# Patient Record
Sex: MALE | Race: WHITE | Employment: UNEMPLOYED | URBAN - METROPOLITAN AREA
[De-identification: names, ages, dates, MRNs, and addresses within clinical notes are randomized per-mention and may not be internally consistent; named-entity substitution may affect disease eponyms.]

---

## 2018-04-10 ENCOUNTER — OFFICE VISIT (OUTPATIENT)
Dept: FAMILY MEDICINE | Facility: CLINIC | Age: 53
End: 2018-04-10
Payer: COMMERCIAL

## 2018-04-10 VITALS
SYSTOLIC BLOOD PRESSURE: 103 MMHG | OXYGEN SATURATION: 95 % | TEMPERATURE: 97 F | RESPIRATION RATE: 16 BRPM | DIASTOLIC BLOOD PRESSURE: 73 MMHG | BODY MASS INDEX: 20.46 KG/M2 | HEART RATE: 102 BPM | WEIGHT: 168 LBS | HEIGHT: 76 IN

## 2018-04-10 DIAGNOSIS — G25.0 ESSENTIAL TREMOR: ICD-10-CM

## 2018-04-10 DIAGNOSIS — S33.5XXA SPRAIN OF LOW BACK, INITIAL ENCOUNTER: Primary | ICD-10-CM

## 2018-04-10 DIAGNOSIS — R56.9 SEIZURE (H): ICD-10-CM

## 2018-04-10 PROCEDURE — 99214 OFFICE O/P EST MOD 30 MIN: CPT | Performed by: FAMILY MEDICINE

## 2018-04-10 RX ORDER — MINOCYCLINE HYDROCHLORIDE 100 MG/1
CAPSULE ORAL
COMMUNITY
Start: 2018-03-20 | End: 2018-08-05

## 2018-04-10 RX ORDER — SULINDAC 200 MG/1
200 TABLET ORAL 2 TIMES DAILY PRN
Qty: 15 TABLET | Refills: 1 | Status: SHIPPED | OUTPATIENT
Start: 2018-04-10 | End: 2018-08-05

## 2018-04-10 RX ORDER — TRIAMCINOLONE ACETONIDE 1 MG/G
OINTMENT TOPICAL
COMMUNITY
Start: 2018-03-20 | End: 2018-08-05

## 2018-04-10 RX ORDER — CYCLOBENZAPRINE HCL 5 MG
5 TABLET ORAL 2 TIMES DAILY PRN
Qty: 30 TABLET | Refills: 1 | Status: SHIPPED | OUTPATIENT
Start: 2018-04-10 | End: 2018-08-05

## 2018-04-10 NOTE — MR AVS SNAPSHOT
"              After Visit Summary   4/10/2018    Jorge L Townsend    MRN: [id number]           Patient Information     Date Of Birth          1965        Visit Information        Provider Department      4/10/2018 12:40 PM Oskar Arthur MD [facility]        Today's Diagnoses     Sprain of low back, initial encounter    -  1    Essential tremor        Seizure (H)           Follow-ups after your visit        Additional Services     PHYSICAL THERAPY REFERRAL       *This therapy referral will be filtered to a centralized scheduling office at [facility] and the patient will receive a call to schedule an appointment at a [facility] location most convenient for them. *     [facility] provides Physical Therapy evaluation and treatment and many specialty services across the [facility] system.  If requesting a specialty program, please choose from the list below.    If you have not heard from the scheduling office within 2 business days, please call [phone] for all locations, with the exception of [place], please call [phone] and [facility], please call [phone]  Treatment: Evaluation & Treatment  Special Instructions/Modalities: none  Special Programs: None    Please be aware that coverage of these services is subject to the terms and limitations of your health insurance plan.  Call member services at your health plan with any benefit or coverage questions.      **Note to Provider:  If you are referring outside of [facility] for the therapy appointment, please list the name of the location in the \"special instructions\" above, print the referral and give to the patient to schedule the appointment.                  Follow-up notes from your care team     Return in about 1 year (around 4/10/2019) for Physical Exam.      Who to contact     If you have questions or need follow up information about today's clinic visit or your schedule please contact [facility] directly at [phone].  Normal or non-critical lab and imaging results will be communicated to you by MyChart, letter or phone within 4 business days after the clinic has received the results. If you do not hear from us within 7 days, please contact the clinic through MyChart or phone. If you have a critical or abnormal lab result, we will notify you by phone as soon as possible.  Submit refill requests through [employer] or call your pharmacy and they will forward the refill request to us. Please allow 3 business days for your refill to be completed.          Additional Information About Your Visit        [employer] Information     [employer] lets you send messages to your doctor, view your test results, renew your prescriptions, schedule appointments and more. To sign up, go to [email or web address] . Click on \"Log in\" on the left side of the screen, which will take you to the Welcome page. Then click on \"Sign up Now\" on the right side of the page.     You will be asked to enter the access code listed below, as well as some personal information. Please follow the directions to create your username and password.     Your access code is: [email or web address]  Expires: 2018  1:03 PM     Your access code will  in 90 days. If you need help or a new code, please call your [facility] clinic or [phone].        Care EveryWhere ID     This is your Care EveryWhere ID. This could be used by other organizations to access your [facility] medical records  [id number]        Your Vitals Were     Pulse Temperature Respirations Height Pulse Oximetry BMI (Body Mass Index)    102 97  F (36.1  C) (Tympanic) 16 6' 4.25\" (1.937 m) 95% 20.32 kg/m2       Blood Pressure from Last 3 Encounters:   04/10/18 103/73   16 125/79   16 102/68    Weight from Last 3 Encounters:   04/10/18 168 lb (76.2 kg)   16 185 lb (83.9 kg)   16 171 lb (77.6 kg)              We Performed the Following     PHYSICAL " THERAPY REFERRAL          Today's Medication Changes          These changes are accurate as of 4/10/18  1:03 PM.  If you have any questions, ask your nurse or doctor.               Start taking these medicines.        Dose/Directions    cyclobenzaprine 5 MG tablet   Commonly known as:  FLEXERIL   Used for:  Sprain of low back, initial encounter   Started by:  Oskar Arthur MD        Dose:  5 mg   Take 1 tablet (5 mg) by mouth 2 times daily as needed for muscle spasms   Quantity:  30 tablet   Refills:  1       sulindac 200 MG tablet   Commonly known as:  CLINORIL   Used for:  Sprain of low back, initial encounter   Started by:  Oskar Arthur MD        Dose:  200 mg   Take 1 tablet (200 mg) by mouth 2 times daily as needed   Quantity:  15 tablet   Refills:  1            Where to get your medicines      These medications were sent to [facility] [place] [address] [address]     Phone:  [phone]     cyclobenzaprine 5 MG tablet    sulindac 200 MG tablet                Primary Care Provider Office Phone # Fax #    Oskar Arthur [phone]       [address] [place] [address]        Equal Access to Services     DEBORA HUYNH AH: Hadii aad ku hadasho Soomaali, waaxda luqadaha, qaybta kaalmada adeegyada, waxay idiin hayaan adeeg kharakaren laeryn corona. So [facility] [phone].    ATENCIÓN: Si habla español, tiene a pleitez disposición servicios gratuitos de asistencia lingüística. Llame al [phone].    We comply with applicable federal civil rights laws and [place] laws. We do not discriminate on the basis of race, color, national origin, age, disability, sex, sexual orientation, or gender identity.            Thank you!     Thank you for choosing [facility]  for your care. Our goal is always to provide you with excellent care. Hearing back from our patients is one way we can continue to improve our  services. Please take a few minutes to complete the written survey that you may receive in the mail after your visit with us. Thank you!             Your Updated Medication List - Protect others around you: Learn how to safely use, store and throw away your medicines at www.disposemymeds.org.          This list is accurate as of 4/10/18  1:03 PM.  Always use your most recent med list.                   Brand Name Dispense Instructions for use Diagnosis    cyclobenzaprine 5 MG tablet    FLEXERIL    30 tablet    Take 1 tablet (5 mg) by mouth 2 times daily as needed for muscle spasms    Sprain of low back, initial encounter       levETIRAcetam 1000 MG Tabs      TAKE 1 TABLET BY MOUTH EVERY 12 HOURS        minocycline 100 MG capsule    MINOCIN/DYNACIN          sulindac 200 MG tablet    CLINORIL    15 tablet    Take 1 tablet (200 mg) by mouth 2 times daily as needed    Sprain of low back, initial encounter       triamcinolone 0.1 % ointment    KENALOG

## 2018-04-10 NOTE — NURSING NOTE
"Chief Complaint   Patient presents with     Back Pain       Initial /73 (Cuff Size: Adult Regular)  Pulse 102  Temp 97  F (36.1  C) (Tympanic)  Resp 16  Ht 6' 4.25\" (1.937 m)  Wt 168 lb (76.2 kg)  SpO2 95%  BMI 20.32 kg/m2 Estimated body mass index is 20.32 kg/(m^2) as calculated from the following:    Height as of this encounter: 6' 4.25\" (1.937 m).    Weight as of this encounter: 168 lb (76.2 kg).  Medication Reconciliation: complete   Melva Resendez, CMA    "

## 2018-04-10 NOTE — PROGRESS NOTES
"  SUBJECTIVE:   Jorge L Townsend is a 52 year old male who presents to clinic today for the following health issues    Musculoskeletal problem/pain      Duration: 3 weeks     Description  Location: low back pain    Intensity:  moderate    Accompanying signs and symptoms: none    History  Previous similar problem: YES- history of back problems   Previous evaluation:  none    Precipitating or alleviating factors:  Trauma or overuse: no   Aggravating factors include: none    Therapies tried and outcome: nothing    Problem list and histories reviewed & adjusted, as indicated.  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     Other chronic nonalcoholic liver disease     Alcohol dependence (H)     Alcohol withdrawal seizure     Seizure (H)     Past Surgical History:   Procedure Laterality Date     THORACIC SURGERY         Social History   Substance Use Topics     Smoking status: Former Smoker     Smokeless tobacco: Never Used     Alcohol use Yes      Comment: \"2-3 straight vodkas nightly \"      Family History   Problem Relation Age of Onset     CANCER Father      CEREBROVASCULAR DISEASE Sister      DIABETES No family hx of      Hypertension No family hx of      Thyroid Disease No family hx of      Glaucoma No family hx of      Macular Degeneration No family hx of          Current Outpatient Prescriptions   Medication Sig Dispense Refill     minocycline (MINOCIN/DYNACIN) 100 MG capsule        sulindac (CLINORIL) 200 MG tablet Take 1 tablet (200 mg) by mouth 2 times daily as needed 15 tablet 1     cyclobenzaprine (FLEXERIL) 5 MG tablet Take 1 tablet (5 mg) by mouth 2 times daily as needed for muscle spasms 30 tablet 1     levETIRAcetam 1000 MG TABS TAKE 1 TABLET BY MOUTH EVERY 12 HOURS  4     triamcinolone (KENALOG) 0.1 % ointment        No Known " "Allergies  Recent Labs   Lab Test  01/20/16   1340  01/11/16   0840  01/09/16   1801  02/12/15   1550   01/22/15   1026   LDL   --    --    --    --    --   61   HDL   --    --    --    --    --   112   TRIG   --    --    --    --    --   55   ALT  94*  67  90*   --    < >  196*   CR   --   0.70  0.82   --    < >  0.66   GFRESTIMATED   --   >90  Non  GFR Calc    >90  Non  GFR Calc     --    < >  >90  Non  GFR Calc     GFRESTBLACK   --   >90   GFR Calc    >90   GFR Calc     --    < >  >90   GFR Calc     POTASSIUM   --   3.8  4.0   --    < >  4.1   TSH   --    --    --   2.72   --    --     < > = values in this interval not displayed.      BP Readings from Last 3 Encounters:   04/10/18 103/73   06/09/16 125/79   01/20/16 102/68    Wt Readings from Last 3 Encounters:   04/10/18 168 lb (76.2 kg)   06/08/16 185 lb (83.9 kg)   01/20/16 171 lb (77.6 kg)                    Reviewed and updated as needed this visit by clinical staff       Reviewed and updated as needed this visit by Provider         ROS:  Constitutional, HEENT, cardiovascular, pulmonary, gi and gu systems are negative, except as otherwise noted.    OBJECTIVE:     /73 (Cuff Size: Adult Regular)  Pulse 102  Temp 97  F (36.1  C) (Tympanic)  Resp 16  Ht 6' 4.25\" (1.937 m)  Wt 168 lb (76.2 kg)  SpO2 95%  BMI 20.32 kg/m2  Body mass index is 20.32 kg/(m^2).  GENERAL: healthy, alert and no distress  NECK: no adenopathy, no asymmetry, masses, or scars and thyroid normal to palpation  RESP: lungs clear to auscultation - no rales, rhonchi or wheezes  CV: regular rate and rhythm, normal S1 S2, no S3 or S4, no murmur, click or rub, no peripheral edema and peripheral pulses strong  ABDOMEN: soft, nontender, no hepatosplenomegaly, no masses and bowel sounds normal  MS: no gross musculoskeletal defects noted, no edema        ASSESSMENT/PLAN:   ASSESSMENT / " PLAN:  (S33.9XXA) Sprain of low back, initial encounter  (primary encounter diagnosis)  Comment: recurred about 2 weeks ago, has no radiculopathy  Will have him to start PT and NSAIDs with muscle relaxant  Plan: PHYSICAL THERAPY REFERRAL, sulindac (CLINORIL)         200 MG tablet, cyclobenzaprine (FLEXERIL) 5 MG         tablet            (G25.0) Essential tremor  Comment: has been stable with current regimen, not been drinking excessively    Plan: encouraged him to try hard to quit drinking     (R56.9) Seizure (H)  Comment: stable, currently on keppra   Plan: will keep watching sx         FUTURE APPOINTMENTS:       - Follow-up visit in 1 year for CPE    Oskar Arthur MD  [facility]

## 2018-05-02 ENCOUNTER — MYC MEDICAL ADVICE (OUTPATIENT)
Dept: FAMILY MEDICINE | Facility: CLINIC | Age: 53
End: 2018-05-02

## 2018-05-02 DIAGNOSIS — G40.909 NONINTRACTABLE EPILEPSY WITHOUT STATUS EPILEPTICUS, UNSPECIFIED EPILEPSY TYPE (H): Primary | ICD-10-CM

## 2018-05-08 ENCOUNTER — TRANSFERRED RECORDS (OUTPATIENT)
Dept: HEALTH INFORMATION MANAGEMENT | Facility: CLINIC | Age: 53
End: 2018-05-08

## 2018-05-30 RX ORDER — LEVETIRACETAM 1000 MG/1
TABLET ORAL
Qty: 180 TABLET | Refills: 0 | Status: SHIPPED | OUTPATIENT
Start: 2018-05-30 | End: 2018-08-05

## 2018-05-30 NOTE — TELEPHONE ENCOUNTER
I will have him to see me every 6 months for f/u, will need lab test at the visit     Pt also need to see [facility] if he should stay in [facility] network, referral order placed  Tapering or weaning off med should be discussed with neurologist as was referred

## 2018-08-05 ENCOUNTER — TRANSFERRED RECORDS (OUTPATIENT)
Dept: HEALTH INFORMATION MANAGEMENT | Facility: CLINIC | Age: 53
End: 2018-08-05

## 2018-08-05 ENCOUNTER — APPOINTMENT (OUTPATIENT)
Dept: CT IMAGING | Facility: CLINIC | Age: 53
End: 2018-08-05
Attending: EMERGENCY MEDICINE
Payer: COMMERCIAL

## 2018-08-05 ENCOUNTER — APPOINTMENT (OUTPATIENT)
Dept: GENERAL RADIOLOGY | Facility: CLINIC | Age: 53
End: 2018-08-05
Attending: RADIOLOGY
Payer: COMMERCIAL

## 2018-08-05 ENCOUNTER — HOSPITAL ENCOUNTER (INPATIENT)
Facility: CLINIC | Age: 53
LOS: 2 days | Discharge: HOME | End: 2018-08-08
Attending: EMERGENCY MEDICINE | Admitting: INTERNAL MEDICINE
Payer: COMMERCIAL

## 2018-08-05 DIAGNOSIS — J93.9 PNEUMOTHORAX ON RIGHT: ICD-10-CM

## 2018-08-05 DIAGNOSIS — R94.2 ABNORMAL LUNG SCAN: Primary | ICD-10-CM

## 2018-08-05 LAB
ANION GAP SERPL CALCULATED.3IONS-SCNC: 7 MMOL/L (ref 3–14)
BASOPHILS # BLD AUTO: 0.1 10E9/L (ref 0–0.2)
BASOPHILS NFR BLD AUTO: 0.8 %
BUN SERPL-MCNC: 15 MG/DL (ref 7–30)
CALCIUM SERPL-MCNC: 9.1 MG/DL (ref 8.5–10.1)
CHLORIDE SERPL-SCNC: 105 MMOL/L (ref 94–109)
CO2 SERPL-SCNC: 27 MMOL/L (ref 20–32)
CREAT SERPL-MCNC: 0.76 MG/DL (ref 0.66–1.25)
DIFFERENTIAL METHOD BLD: ABNORMAL
EOSINOPHIL # BLD AUTO: 0.4 10E9/L (ref 0–0.7)
EOSINOPHIL NFR BLD AUTO: 6.1 %
ERYTHROCYTE [DISTWIDTH] IN BLOOD BY AUTOMATED COUNT: 13.5 % (ref 10–15)
GFR SERPL CREATININE-BSD FRML MDRD: >90 ML/MIN/1.7M2
GLUCOSE SERPL-MCNC: 94 MG/DL (ref 70–99)
HCT VFR BLD AUTO: 39.9 % (ref 40–53)
HGB BLD-MCNC: 13.3 G/DL (ref 13.3–17.7)
IMM GRANULOCYTES # BLD: 0 10E9/L (ref 0–0.4)
IMM GRANULOCYTES NFR BLD: 0.2 %
INR PPP: 0.95 (ref 0.86–1.14)
LYMPHOCYTES # BLD AUTO: 1.8 10E9/L (ref 0.8–5.3)
LYMPHOCYTES NFR BLD AUTO: 30.5 %
MCH RBC QN AUTO: 33 PG (ref 26.5–33)
MCHC RBC AUTO-ENTMCNC: 33.3 G/DL (ref 31.5–36.5)
MCV RBC AUTO: 99 FL (ref 78–100)
MONOCYTES # BLD AUTO: 1.1 10E9/L (ref 0–1.3)
MONOCYTES NFR BLD AUTO: 17.4 %
NEUTROPHILS # BLD AUTO: 2.7 10E9/L (ref 1.6–8.3)
NEUTROPHILS NFR BLD AUTO: 45 %
NRBC # BLD AUTO: 0 10*3/UL
NRBC BLD AUTO-RTO: 0 /100
PLATELET # BLD AUTO: 134 10E9/L (ref 150–450)
POTASSIUM SERPL-SCNC: 4.6 MMOL/L (ref 3.4–5.3)
RADIOLOGIST FLAGS: ABNORMAL
RBC # BLD AUTO: 4.03 10E12/L (ref 4.4–5.9)
SODIUM SERPL-SCNC: 139 MMOL/L (ref 133–144)
WBC # BLD AUTO: 6 10E9/L (ref 4–11)

## 2018-08-05 PROCEDURE — 25000128 H RX IP 250 OP 636: Performed by: EMERGENCY MEDICINE

## 2018-08-05 PROCEDURE — G0378 HOSPITAL OBSERVATION PER HR: HCPCS

## 2018-08-05 PROCEDURE — 0W9930Z DRAINAGE OF RIGHT PLEURAL CAVITY WITH DRAINAGE DEVICE, PERCUTANEOUS APPROACH: ICD-10-PCS | Performed by: RADIOLOGY

## 2018-08-05 PROCEDURE — 27210194 CT CHEST TUBE WITH CATH PLACEMENT

## 2018-08-05 PROCEDURE — 25000125 ZZHC RX 250: Performed by: RADIOLOGY

## 2018-08-05 PROCEDURE — 80048 BASIC METABOLIC PNL TOTAL CA: CPT | Performed by: EMERGENCY MEDICINE

## 2018-08-05 PROCEDURE — 99285 EMERGENCY DEPT VISIT HI MDM: CPT | Mod: 25

## 2018-08-05 PROCEDURE — 71046 X-RAY EXAM CHEST 2 VIEWS: CPT

## 2018-08-05 PROCEDURE — 96374 THER/PROPH/DIAG INJ IV PUSH: CPT

## 2018-08-05 PROCEDURE — 96361 HYDRATE IV INFUSION ADD-ON: CPT

## 2018-08-05 PROCEDURE — 85025 COMPLETE CBC W/AUTO DIFF WBC: CPT | Performed by: EMERGENCY MEDICINE

## 2018-08-05 PROCEDURE — 99220 ZZC INITIAL OBSERVATION CARE,LEVL III: CPT | Performed by: INTERNAL MEDICINE

## 2018-08-05 PROCEDURE — 71250 CT THORAX DX C-: CPT

## 2018-08-05 PROCEDURE — 96376 TX/PRO/DX INJ SAME DRUG ADON: CPT

## 2018-08-05 PROCEDURE — 25000128 H RX IP 250 OP 636: Performed by: RADIOLOGY

## 2018-08-05 PROCEDURE — 85610 PROTHROMBIN TIME: CPT | Performed by: EMERGENCY MEDICINE

## 2018-08-05 RX ORDER — MORPHINE SULFATE 2 MG/ML
1 INJECTION, SOLUTION INTRAMUSCULAR; INTRAVENOUS
Status: DISCONTINUED | OUTPATIENT
Start: 2018-08-05 | End: 2018-08-08 | Stop reason: HOSPADM

## 2018-08-05 RX ORDER — FENTANYL CITRATE 50 UG/ML
25-50 INJECTION, SOLUTION INTRAMUSCULAR; INTRAVENOUS EVERY 5 MIN PRN
Status: DISCONTINUED | OUTPATIENT
Start: 2018-08-05 | End: 2018-08-06

## 2018-08-05 RX ORDER — LEVETIRACETAM 500 MG/1
1000 TABLET ORAL DAILY
Status: DISCONTINUED | OUTPATIENT
Start: 2018-08-06 | End: 2018-08-08 | Stop reason: HOSPADM

## 2018-08-05 RX ORDER — LORAZEPAM 2 MG/ML
1 INJECTION INTRAMUSCULAR ONCE
Status: COMPLETED | OUTPATIENT
Start: 2018-08-05 | End: 2018-08-05

## 2018-08-05 RX ORDER — METRONIDAZOLE 7.5 MG/G
GEL TOPICAL 2 TIMES DAILY
Status: DISCONTINUED | OUTPATIENT
Start: 2018-08-05 | End: 2018-08-08 | Stop reason: HOSPADM

## 2018-08-05 RX ORDER — AMOXICILLIN 250 MG
1 CAPSULE ORAL 2 TIMES DAILY PRN
Status: DISCONTINUED | OUTPATIENT
Start: 2018-08-05 | End: 2018-08-08 | Stop reason: HOSPADM

## 2018-08-05 RX ORDER — LEVETIRACETAM 1000 MG/1
1000 TABLET ORAL EVERY MORNING
COMMUNITY
End: 2018-11-05

## 2018-08-05 RX ORDER — AMOXICILLIN 250 MG
2 CAPSULE ORAL 2 TIMES DAILY PRN
Status: DISCONTINUED | OUTPATIENT
Start: 2018-08-05 | End: 2018-08-08 | Stop reason: HOSPADM

## 2018-08-05 RX ORDER — METRONIDAZOLE 7.5 MG/G
GEL TOPICAL EVERY MORNING
COMMUNITY
End: 2020-01-09

## 2018-08-05 RX ORDER — ACETAMINOPHEN 650 MG/1
650 SUPPOSITORY RECTAL EVERY 4 HOURS PRN
Status: DISCONTINUED | OUTPATIENT
Start: 2018-08-05 | End: 2018-08-08 | Stop reason: HOSPADM

## 2018-08-05 RX ORDER — FLUMAZENIL 0.1 MG/ML
0.2 INJECTION, SOLUTION INTRAVENOUS
Status: DISCONTINUED | OUTPATIENT
Start: 2018-08-05 | End: 2018-08-08 | Stop reason: HOSPADM

## 2018-08-05 RX ORDER — ONDANSETRON 4 MG/1
4 TABLET, ORALLY DISINTEGRATING ORAL EVERY 6 HOURS PRN
Status: DISCONTINUED | OUTPATIENT
Start: 2018-08-05 | End: 2018-08-08 | Stop reason: HOSPADM

## 2018-08-05 RX ORDER — ONDANSETRON 2 MG/ML
4 INJECTION INTRAMUSCULAR; INTRAVENOUS ONCE
Status: DISCONTINUED | OUTPATIENT
Start: 2018-08-05 | End: 2018-08-08 | Stop reason: HOSPADM

## 2018-08-05 RX ORDER — LORAZEPAM 0.5 MG/1
.5-1 TABLET ORAL EVERY 4 HOURS PRN
Status: DISCONTINUED | OUTPATIENT
Start: 2018-08-05 | End: 2018-08-08 | Stop reason: HOSPADM

## 2018-08-05 RX ORDER — ACETAMINOPHEN 325 MG/1
650 TABLET ORAL EVERY 4 HOURS PRN
Status: DISCONTINUED | OUTPATIENT
Start: 2018-08-05 | End: 2018-08-08 | Stop reason: HOSPADM

## 2018-08-05 RX ORDER — NALOXONE HYDROCHLORIDE 0.4 MG/ML
.1-.4 INJECTION, SOLUTION INTRAMUSCULAR; INTRAVENOUS; SUBCUTANEOUS
Status: DISCONTINUED | OUTPATIENT
Start: 2018-08-05 | End: 2018-08-08 | Stop reason: HOSPADM

## 2018-08-05 RX ORDER — POLYETHYLENE GLYCOL 3350 17 G/17G
17 POWDER, FOR SOLUTION ORAL DAILY PRN
Status: DISCONTINUED | OUTPATIENT
Start: 2018-08-05 | End: 2018-08-08 | Stop reason: HOSPADM

## 2018-08-05 RX ORDER — NALOXONE HYDROCHLORIDE 0.4 MG/ML
.1-.4 INJECTION, SOLUTION INTRAMUSCULAR; INTRAVENOUS; SUBCUTANEOUS
Status: DISCONTINUED | OUTPATIENT
Start: 2018-08-05 | End: 2018-08-08

## 2018-08-05 RX ORDER — ONDANSETRON 2 MG/ML
4 INJECTION INTRAMUSCULAR; INTRAVENOUS EVERY 6 HOURS PRN
Status: DISCONTINUED | OUTPATIENT
Start: 2018-08-05 | End: 2018-08-08 | Stop reason: HOSPADM

## 2018-08-05 RX ORDER — LORAZEPAM 2 MG/ML
1 INJECTION INTRAMUSCULAR ONCE
Status: DISCONTINUED | OUTPATIENT
Start: 2018-08-05 | End: 2018-08-05

## 2018-08-05 RX ORDER — LIDOCAINE HYDROCHLORIDE 10 MG/ML
1-30 INJECTION, SOLUTION EPIDURAL; INFILTRATION; INTRACAUDAL; PERINEURAL
Status: COMPLETED | OUTPATIENT
Start: 2018-08-05 | End: 2018-08-05

## 2018-08-05 RX ORDER — MAGNESIUM CARB/ALUMINUM HYDROX 105-160MG
148 TABLET,CHEWABLE ORAL
Status: DISCONTINUED | OUTPATIENT
Start: 2018-08-05 | End: 2018-08-08 | Stop reason: HOSPADM

## 2018-08-05 RX ADMIN — FENTANYL CITRATE 50 MCG: 50 INJECTION, SOLUTION INTRAMUSCULAR; INTRAVENOUS at 16:54

## 2018-08-05 RX ADMIN — MIDAZOLAM HYDROCHLORIDE 1 MG: 1 INJECTION, SOLUTION INTRAMUSCULAR; INTRAVENOUS at 16:54

## 2018-08-05 RX ADMIN — SODIUM CHLORIDE 1000 ML: 9 INJECTION, SOLUTION INTRAVENOUS at 14:38

## 2018-08-05 RX ADMIN — LORAZEPAM 1 MG: 2 INJECTION INTRAMUSCULAR; INTRAVENOUS at 14:42

## 2018-08-05 RX ADMIN — LORAZEPAM 1 MG: 2 INJECTION INTRAMUSCULAR; INTRAVENOUS at 16:04

## 2018-08-05 RX ADMIN — LIDOCAINE HYDROCHLORIDE 25 ML: 10 INJECTION, SOLUTION EPIDURAL; INFILTRATION; INTRACAUDAL; PERINEURAL at 17:38

## 2018-08-05 ASSESSMENT — ENCOUNTER SYMPTOMS
NERVOUS/ANXIOUS: 1
COUGH: 1
SHORTNESS OF BREATH: 1

## 2018-08-05 NOTE — ED PROVIDER NOTES
"  History     Chief Complaint:  Shortness of breath    HPI   Jorge L Townsend is a 52 year old male with a remote history of bilateral pneumothoraces requiring thoracic surgical pleuredesis who presents with shortness of breath. He has not had any issues for many years. However, the patient states that he developed shortness of breath a few weeks ago along with a productive cough. He was seen at [facility] urgent care and had an x-ray completed demonstrating a loculated lower right pneumothorax with air fluid level noted. The patient presented to the ED for further management.     Allergies:  No known drug allergies.     Medications:    Levetiracetam  Minocycline  Sulindac  Kenalog     Past Medical History:    Alcohol Dependence  Alcohol withdrawal seizure  Essential tremor  Chronic nonalcoholic liver disease  Pneumothorax    Past Surgical History:    Thoracic Surgery    Family History:    Cancer-Father  Cerebrovascular Disease-Sister    Social History:  Marital Status: Single  Presents to the ED with a friend  Tobacco Use: Former Smoker  Alcohol Use: Yes  PCP: Oskar Arthur      Review of Systems   Respiratory: Positive for cough and shortness of breath.    Psychiatric/Behavioral: The patient is nervous/anxious.    All other systems reviewed and are negative.      Physical Exam   First Vitals:  BP: (!) 147/103  Heart Rate: 87  Temp: 99  F (37.2  C)  Resp: 20  Height: 195.6 cm (6' 5\")  Weight: 79.4 kg (175 lb)  SpO2: 92 %      Physical Exam  General: Resting comfortably on the gurney, quite anxious and shaking.   Head:  The scalp, face, and head appear normal  Eyes:  The pupils are equal, round, and reactive to light    There is no nystagmus    Extraocular muscles are intact    Conjunctivae and sclerae are normal  ENT:    The nose is normal    Pinnae are normal    The oropharynx is normal    Uvula is in the midline  Neck:  Normal range of motion    There is no rigidity noted    There is no midline cervical spine " pain/tenderness    Trachea is in the midline    No mass is detected  CV:  Regular rate and underlying rhythm     Normal S1/S2, no S3/S4    No pathological murmur detected  Resp:  There are bilateral healed surgical scares from thoracic surgery.     Lungs sounds are heard bilaterally    There is no tachypnea    Non-labored    No rales    No wheezing   GI:  Abdomen is soft, there is no rigidity    No distension    No tympani    No rebound tenderness     Non-surgical without peritoneal features  MS:  Normal muscular tone    Symmetric motor strength    No major joint effusions    No asymmetric leg swelling, no calf tenderness  Skin:  No rash or acute skin lesions noted  Neuro: Speech is normal and fluent  Psych:  Awake. Alert.      Normal affect.  Appropriate interactions.  Lymph: No anterior cervical lymphadenopathy noted    Emergency Department Course   Imaging:  Chest CT without contrast:   1. Right pneumothorax predominantly at the lower right hemithorax and  posterior medial right chest with mass effect consistent with tension  pneumothorax.  2. Two areas of focal parenchymal density in the right upper lobe may  be focal infection or inflammation but could also represent neoplasm  particularly the more superior spiculated density.  3. Emphysematous changes.  Report per radiology.   Radiographic findings were communicated with the patient who voiced understanding of the findings.    A CT scan with chest tube placement was performed by interventional radiology and then the patient was returned to the emergency department.  The patient's symptoms have improved.  There were no complications to the procedure.  The catheter was placed in the right low to mid anterior chest.    Laboratory:  CBC:  WBC 6.0, HGB 13.3,  (L)   BMP:  WNL (Creatinine 0.76)   INR: 0.95    Interventions:  (1438) Normal Saline, 1 liter, IV bolus   (1442) Ativan, 1 mg, IV injection   (1604) Ativan, 1 mg, IV injection       Emergency  Department Course:  Nursing notes and vitals reviewed.  (1402) I performed an exam of the patient as documented above.    A peripheral IV was established. Blood was drawn from the patient. This was sent for laboratory testing, findings above.    The patient was sent for a Chest CT while in the emergency department, findings above.    (1518) I consulted with Dr. Osorio of Thoracic surgery regarding the patient.   Findings and plan explained to the patient who consents to observation.   (1532) I discussed the patient with Dr. Moncada of the hospitalist service, who will place the patient in observation in the observation  area with Dr. Osorio consulting.    (1549) I consulted with Dr. Isaacs of interventional radiology regarding the patient.   (1553) I consulted with Dr. Osorio of Thoracic Surgery regarding the patient.   (1559) I consulted with Dr. Isaacs of interventional radiology regarding the patient.   (1627) I updated Dr. Moncada of the hospitalist service on the plan.   (1639) I consulted with Dr. Osorio of thoracic surgery regarding the patient.     Impression & Plan      Medical Decision Making:  This patient presents with an increase in dyspnea as noted above.  He has a history of bilateral pneumothoraces and pleurodesis in the past.  He has developed new pneumothoraces on the right.  There is tethered/scarred lung in areas and a few pockets of air.  The interventional radiologist  placed a percutaneous CT-guided chest tube into the anterior pneumothorax without complication, and then the patient will be placed in observation overnight with thoracic surgery consultation with Dr. Osorio.  Dr. Moncada will admit from the hospitalist service to observation.  If the patient has persistent areas of pneumothorax tomorrow in additional effort at IR or surgical management may be indicated.      Diagnosis:    ICD-10-CM    1. Pneumothorax on right J93.9        Disposition:  Admitted to the hospitalist    Interventional radiology and thoracic surgery are consulting      I, Cristina Ramires, am serving as a scribe on 8/5/2018 at 2:02 PM to personally document services performed by Dr. Cornell Boo based on my observations and the provider's statements to me.     8/5/2018    EMERGENCY DEPARTMENT       Cornell Boo MD  08/05/18 2229

## 2018-08-05 NOTE — ED NOTES
"[facility]  ED Nurse Handoff Report    ED Chief complaint: Shortness of Breath (lower right pneumo; seen at )      ED Diagnosis:   Final diagnoses:   Pneumothorax on right       Code Status: Full Code    Allergies: No Known Allergies    Activity level - Baseline/Home:  Independent    Activity Level - Current:   Independent     Needed?: No    Isolation: No  Infection: Not Applicable  Bariatric?: No    Vital Signs:   Vitals:    08/05/18 1420 08/05/18 1448 08/05/18 1517   BP: (!) 147/103  (!) 138/95   Resp: 20     Temp: 99  F (37.2  C)     TempSrc: Oral     SpO2: 92% 93% 92%   Weight: 79.4 kg (175 lb)     Height: 1.956 m (6' 5\")         Cardiac Rhythm: ,        Pain level: 0-10 Pain Scale: 2    Is this patient confused?: No   Ward - Suicide Severity Rating Scale Completed?  Yes  If yes, what color did the patient score?  White    Patient Report: Initial Complaint: SOB; sent from urgent care  Focused Assessment: pt was sent from  after xray showed a pneumothorax; pt has had a pneumo in 1983. I sating 91% on room air and appears comfortable while lying in bed   Tests Performed: CT, labs   Abnormal Results: see results   Treatments provided: IR and chest tube    Family Comments: friends at bedside; supportive     OBS brochure/video discussed/provided to patient: Yes    ED Medications:   Medications   ondansetron (ZOFRAN) injection 4 mg (not administered)   lidocaine (PF) (XYLOCAINE) 1 % injection 1-30 mL (not administered)   midazolam (VERSED) injection 0.5-1 mg (1 mg Intravenous Given 8/5/18 1654)   flumazenil (ROMAZICON) injection 0.2 mg (not administered)   fentaNYL (PF) (SUBLIMAZE) injection 25-50 mcg (50 mcg Intravenous Given 8/5/18 1654)   naloxone (NARCAN) injection 0.1-0.4 mg (not administered)   0.9% sodium chloride BOLUS (1,000 mLs Intravenous New Bag 8/5/18 1438)   LORazepam (ATIVAN) injection 1 mg (1 mg Intravenous Given 8/5/18 1442)   LORazepam (ATIVAN) injection 1 mg (1 mg " Intravenous Given 8/5/18 2564)       Drips infusing?:  No    For the majority of the shift this patient was Green.   Interventions performed were .    Severe Sepsis OR Septic Shock Diagnosis Present: No      ED NURSE PHONE NUMBER: *[phone]

## 2018-08-05 NOTE — PROGRESS NOTES
RECEIVING UNIT ED HANDOFF REVIEW    ED Nurse Handoff Report was reviewed by: Gladys Chong on August 5, 2018 at 4:10 PM

## 2018-08-05 NOTE — IP AVS SNAPSHOT
[facility]    [address] [place] [address]    Phone:  [phone]                                       After Visit Summary   8/5/2018    Jorge L Townsend    MRN: [id number]           After Visit Summary Signature Page     I have received my discharge instructions, and my questions have been answered. I have discussed any challenges I see with this plan with the nurse or doctor.    ..........................................................................................................................................  Patient/Patient Representative Signature      ..........................................................................................................................................  Patient Representative Print Name and Relationship to Patient    ..................................................               ................................................  Date                                            Time    ..........................................................................................................................................  Reviewed by Signature/Title    ...................................................              ..............................................  Date                                                            Time

## 2018-08-05 NOTE — PLAN OF CARE
Problem: Patient Care Overview  Goal: Plan of Care/Patient Progress Review  Outcome: No Change  Pt up from ED at 1800, admitted to OBS room 1. A&O, VSS on 2L NC. Denies pain. Chest tube in place, CDI, on -20 H2O suction. LS course crackles. CXR done. Bed alarm on, Ax1. Reg diet. Dr. Osorio following. Pt will be transferred to station 33 later tonight.

## 2018-08-05 NOTE — H&P
[facility]    History and Physical  Hospitalist       Date of Admission:  8/5/2018  Date of Service (when I saw the patient): 08/05/18    Assessment & Plan   Jorge L Townsend is a 52 year old male w hx of bilateral pneumothoraces requiring thoracic surgical pleuredesis 20+ years ago as well as a 20 year smoking hx who presents with SOB, found to have R sided pneumothorax    Pneumothorax: new mass/consolidation seen as well. Could be infection or neoplasm which may have caused this latest pneumothorax. Has a productive cough but just of white phlegm but not yellow/green, no fevers or chills. Given complicated history he would need chest tube or other intervention from thoracic surgery. Dr. Osorio was called in the ED and is evaluating the imaging with plans to see tomorrow.   -thoracic surgery consult  -register to obs  -O2 via NC as needed    Productive cough: 2/2 pneumonia vs could from pneumothorax. I considered treating however he really isn't sick. If it was an infection antibiotics would likely lower the yield of whatever it is. Will hold of antibiotics for now. Would treat of he became more ill (fevers, etc) or if we wanted to empirically treat and not intervene to see if consolidation would resolve. However sounds likely with the pneumothorax he will need some type of intervention  -hold antibiotics for now  -send sputum for culture as well as any samples from chest tube or surgery  -may treat with abx pending his course and thoracic surgery plan.     DVT Prophylaxis: Low Risk/Ambulatory with no VTE prophylaxis indicated  Code Status: Full Code    Disposition: Expected discharge in 1-2 days once thoracic surgery has evaluated and made plan + intervention.    Piedad Moncada    Primary Care Physician   *Oskar Arthur    Chief Complaint   SOB    History is obtained from the patient    History of Present Illness   Jorge L Townsend is a 52 year old male w distant history of pneumothoracies 20+ years  ago and 20 year smoking hx (quit 20 years ago) presents with SOB. The patient reports over the last 2 weeks or so he has been experiencing SOB. +cough productive of white phlegm. No fevers, chills, n/v/d. He says he feels well at rest, just winded with exertion. He went to urgent care where he was found to have a pneumothorax and so he was sent here. Here he had a CT chest which showed: 1. Right pneumothorax predominantly at the lower right hemithorax and posterior medial right chest with mass effect consistent with tension pneumothorax.  2. Two areas of focal parenchymal density in the right upper lobe may be focal infection or inflammation but could also represent neoplasm particularly the more superior spiculated density.  3. Emphysematous changes.     Dr. Boo in the ED called Dr. Osorio who recommended he be admitted under obs and they would make a plan for possible interventions.   Past Medical History    I have reviewed this patient's medical history and updated it with pertinent information if needed.   Past Medical History:   Diagnosis Date     Alcohol dependence (H) 3/2/2015     Back pain      CARDIOVASCULAR SCREENING; LDL GOAL LESS THAN 160      Essential tremor      Other chronic nonalcoholic liver disease 3/2/2015     Pneumothorax     1983's       Past Surgical History   I have reviewed this patient's surgical history and updated it with pertinent information if needed.  Past Surgical History:   Procedure Laterality Date     THORACIC SURGERY     20 and 25 years ago per pt    Prior to Admission Medications   Prior to Admission Medications   Prescriptions Last Dose Informant Patient Reported? Taking?   levETIRAcetam 1000 MG TABS 8/5/2018 at am Self Yes Yes   Sig: Take 1 tablet by mouth daily   metroNIDAZOLE (METROGEL) 0.75 % topical gel 8/5/2018 at am Self Yes Yes   Sig: Apply topically 2 times daily Apply to nose      Facility-Administered Medications: None     Allergies   No Known Allergies    Social  History   I have reviewed this patient's social history and updated it with pertinent information if needed. Jorge L Townsend  reports that he has quit smoking. He has never used smokeless tobacco. He reports that he drinks alcohol. He reports that he does not use illicit drugs.    Family History   I have reviewed this patient's family history and updated it with pertinent information if needed.   Family History   Problem Relation Age of Onset     Cancer Father      Cerebrovascular Disease Sister      Diabetes No family hx of      Hypertension No family hx of      Thyroid Disease No family hx of      Glaucoma No family hx of      Macular Degeneration No family hx of        Review of Systems   C: NEGATIVE for fever, chills, change in weight  E/M: NEGATIVE for ear, mouth and throat problems  CV: NEGATIVE for chest pain, palpitations or peripheral edema    Physical Exam   Temp: 99  F (37.2  C) Temp src: Oral BP: 133/89   Heart Rate: 87 Resp: 20 SpO2: 93 % O2 Device: Nasal cannula Oxygen Delivery: 2 LPM  Vital Signs with Ranges  Temp:  [99  F (37.2  C)] 99  F (37.2  C)  Heart Rate:  [87] 87  Resp:  [20] 20  BP: (133-147)/() 133/89  SpO2:  [92 %-93 %] 93 %  175 lbs 0 oz    Constitutional: Awake and Alert, NAD, mildly anxious  Eyes: anicteric  HEENT: No masses, No swelling  Respiratory: CTAB, no wheezes, rales or rhonchi, dullness at R base  Cardiovascular: RRR no mrg  GI: soft, ntnd  Lymph/Hematologic: no bruising  Skin: no rashes  Musculoskeletal: no deformity  Neurologic: A&Ox3  Psychiatric: anxious but reasonable. Cooperative    Data   Data reviewed today:  I personally reviewed the chest CT image(s) showing pneumothorax and consolidation..    Recent Labs  Lab 08/05/18  1433   WBC 6.0   HGB 13.3   MCV 99   *   INR 0.95      POTASSIUM 4.6   CHLORIDE 105   CO2 27   BUN 15   CR 0.76   ANIONGAP 7   MIKE 9.1   GLC 94       Recent Results (from the past 24 hour(s))   Chest CT w/o contrast   Result Value     Radiologist flags Right tension pneumothorax (AA)    Narrative    CT CHEST WITHOUT CONTRAST  8/5/2018 3:08 PM    HISTORY: History of bilateral pneumothorax and pleurodesis, now  loculated pneumo on right.     TECHNIQUE: Axial images from thoracic inlet to diaphragm. Noncontrast  technique. Radiation dose for this scan was reduced using automated  exposure control, adjustment of the mA and/or kV according to patient  size, or iterative reconstruction technique.    COMPARISON: 9/28/2006 chest CT.    FINDINGS:   Chest: Large right pneumothorax. There is depression of the right  hemidiaphragm and some mass effect on the right heart margin  consistent with tension pneumothorax. Trachea is midline. There are  thin-walled blebs and bulla at the right lung apex and emphysematous  changes in the lungs. There is an irregular elongated opacity in the  right upper lobe series 3 image 15, 2.3 x 1.0 x 2.4 cm in size which  is nonspecific but at least moderately concerning for neoplasm. Focal  infection or inflammation/organizing pneumonia could have this  appearance. There is more inferior lateral opacity in the right mid  lung (image 29 series 3) 2.8 x 2.8 x 2.1 cm in size with cystic areas  or bronchiectasis. There is thickening along the posterior inferior  left lung margin. The largest area of pneumothorax is at the lower  left chest approximately 6.8 cm craniocaudal dimension on coronal  image 30. There is a band of pneumothorax posterior medial to the  right lung extending from the apex to the lower chest 22 cm in  craniocaudal length, 12 cm in transverse oblique dimension at the  subcarinal level and 3.6 cm in AP dimension. A portion of right upper  and mid lung still extends to the lateral pleural surface and may be  adhered or scarred preventing it from collapsing completely.    No evidence for left pneumothorax. Postoperative changes at the left  apex. No acute infiltrate on the left. Tiny indeterminate  apical  nodule.    Numerous small mediastinal nodes. No pleural effusion. Hypodense  posterior superior right kidney lesion consistent with probable cyst.  No aggressive bone lesions.      Impression    IMPRESSION:  1. Right pneumothorax predominantly at the lower right hemithorax and  posterior medial right chest with mass effect consistent with tension  pneumothorax.  2. Two areas of focal parenchymal density in the right upper lobe may  be focal infection or inflammation but could also represent neoplasm  particularly the more superior spiculated density.  3. Emphysematous changes.    [Critical Result: Right tension pneumothorax]  Finding was identified on 8/5/2018 3:10 PM.   Dr. Boo was contacted by me on 8/5/2018 3:13 PM and verbalized  understanding of the critical result.

## 2018-08-05 NOTE — IP AVS SNAPSHOT
MRN:[id number]                      After Visit Summary   8/5/2018    Jorge L Townsend    MRN: [id number]           Thank you!     Thank you for choosing [facility] for your care. Our goal is always to provide you with excellent care. Hearing back from our patients is one way we can continue to improve our services. Please take a few minutes to complete the written survey that you may receive in the mail after you visit with us. Thank you!        Patient Information     Date Of Birth          1965        Designated Caregiver       Most Recent Value    Caregiver    Will someone help with your care after discharge? yes    Name of designated caregiver  Jorge L Pelayo    Phone number of caregiver [phone]    Caregiver address [address] [place] [address]      About your hospital stay     You were admitted on:  August 5, 2018 You last received care in the:  [place] [address] [facility]    You were discharged on:  August 8, 2018        Reason for your hospital stay       Pneumothorax                  Who to Call     For medical emergencies, please call 911.  For non-urgent questions about your medical care, please call your primary care provider or clinic, [phone]          Attending Provider     Provider Specialty    Cornell Boo MD Emergency Medicine    [facility], Piedad Hammer MD Internal Medicine    [place], Stephen Clifford DO Internal Medicine       Primary Care Provider Office Phone # Fax #    Oskar Arthur [phone]       When to contact your care team       Call Dr Osorio office at [phone] if you have any of the following:   --temperature greater than 101.5F,  --increasing shortness of breath,  --increased swelling or purulent discharge at incision site,  --if your air leak appears to have sealed off  --Or any other questions or concerns.                  After Care Instructions     Activity       Your activity upon discharge:   --Activity  "as tolerated,  --Check the portable chest tube box 1-2 times per day for air leak.   --Do not shower until you have been cleared to do so by Thoracic Surgery (only sponge baths for now)  --Use the incentive spirometer at least 10 times per day            Activity       Your activity upon discharge: activity as tolerated            Diet       Follow this diet upon discharge: Orders Placed This Encounter      Regular Diet Adult            Tubes and drains       PNEUMOSTAT  1) Pneumostat drain in place. Please keep the box upright as much as possible to prevent fluid from leaking out of the portable atrium.   2) Make sure tube stays taped down to your skin so that it does not get pulled out.    3) Empty fluid in the pneumostat daily or as needed with luer lock syringe through the port on the bottom of the Atrium. Cleanse port with alcohol wipe prior to attaching syringe to port. You can put the drainage in the toilet.   4) Check for an airleak daily by placing a few drops of the provided sterile saline into the cup on the front of Pneumostat and coughing. If the water sputters out and air bubbles are seen, an airleak is still present.  If water remains quiet and does not show any air bubbles, your air leak has probably healed.  If no airleak seen, please call Dr. Osorio' office at the number below for further instructions.  5) Do not clamp tube (turn the stopcock on the tubing) unless instructed to do so by Thoracic Surgery team.   6) Call Dr. Osorio' office at [phone] with any questions or concerns. Or may call [facility] with device questions at [phone].    NOTE:  When it appears your air leak has stopped, call Thoracic Surgery at [phone].  We will likely ask you to do what is called a \"clamping trial\" where you will turn the stopcock 90 degrees effectively sealing it off the chest tube.  You will leave it \"clamp\" for 6-12 hours (or as instructed by the nurse) and will likely have a " "follow-up x-ray to make sure your lung has not collapsed back down.  If your lung has remained up despite clamping the tube, it means the leak has completely sealed and we can remove your chest tube.  Dr Osorio nurse will give you instructions on when to come in to have it removed.                  Follow-up Appointments     Follow-up and recommended labs and tests        Follow up with primary care provider, Oskar Arthur, within 2-4 weeks, for hospital follow- up. No follow up labs or test are needed.  Follow up with thoracic surgery as planned.  Patient needs a PET/CT as discussed with Dr. Osorio                  Future tests that were ordered for you     PET Oncology Whole Body                 Further instructions from your care team           A. Patient Care:  Call Dr Osorio  office @ [phone] if you experience:  *Severe chills or a fever or 101 F or higher on two occasions  *Increased incisional pain that cannot be relieved with rest or pain medications  *Presence of unusual incisional or chest tube site drainage that is odorous, green or yellow in color, or if your incision is warm, red or swollen  *Coughing up bright red blood or greenish-yellow secretions  *Chest pain that gets worse with deep breathing or a significant increase in shortness of breath  *Inability to urinate or have a bowel movement  *New pain or swelling in your legs    In an emergency, call 911 or have someone drive you to the nearest Emergency Department    Pain Relief:  You may have been given a prescription for narcotic pain medicine.  You may also take ibuprofen and acetaminophen either as a new prescription  or over the counter.  Recommended dosages are:  600 mg Ibuprofen every 6 hours as needed and 650 mg Acetaminophen every 4 hours as needed.  Many patients get good pain relief by \"staggering\" these medications.     Constipation:  Narcotic pain medication, general anesthesia, and time in the hospital with less activity than " "normal can all cause constipation. Please take a stool softener (what you have at home or one that was prescribed during hospital discharge, such as Senokot-S, docusate sodium, Miralax, Milk of Magnesia) while you are taking narcotics to prevent constipation. Stop taking the stool softener once you are done taking narcotics or if you begin having loose stools/diarrhea. Please call our clinic nurse, Beth, at [phone] if you are not having success (not having BMs) with your current stool softener.     No driving while on narcotics.         Pending Results     Date and Time Order Name Status Description    8/6/2018 0500 Histoplasma capsulatum antigen In process             Statement of Approval     Ordered          08/08/18 9150  I have reviewed and agree with all the recommendations and orders detailed in this document.  EFFECTIVE NOW     Approved and electronically signed by:  Stephen Willis DO             Admission Information     Date & Time Provider Department Dept. Phone    8/5/2018 Stephen Willis DO [place] [address] [facility] [phone]      Your Vitals Were     Blood Pressure Pulse Temperature Respirations Height Weight    107/67 (BP Location: Right arm) 78 98.1  F (36.7  C) (Oral) 18 1.956 m (6' 5\") 79.4 kg (175 lb)    Pulse Oximetry BMI (Body Mass Index)                93% 20.75 kg/m2          MyChart Information     [facility] gives you secure access to your electronic health record. If you see a primary care provider, you can also send messages to your care team and make appointments. If you have questions, please call your primary care clinic.  If you do not have a primary care provider, please call [phone] and they will assist you.        Care EveryWhere ID     This is your Care EveryWhere ID. This could be used by other organizations to access your [facility] medical records  [id number]        Equal Access to Services     DEBORA HUYNH : John hurst " Soobed, wachelseada luqadaha, qaybta kaalmada nano, gia bobbygeneva chloe. So [facility] [phone].    ATENCIÓN: Si raquel gavin, tiene a pleitez disposición servicios gratuitos de asistencia lingüística. Princess al [phone].    We comply with applicable federal civil rights laws and [place] laws. We do not discriminate on the basis of race, color, national origin, age, disability, sex, sexual orientation, or gender identity.               Review of your medicines      CONTINUE these medicines which have NOT CHANGED        Dose / Directions    levETIRAcetam 1000 MG Tabs        Dose:  1 tablet   Take 1 tablet by mouth daily   Refills:  0       metroNIDAZOLE 0.75 % topical gel   Commonly known as:  METROGEL        Apply topically 2 times daily Apply to nose   Refills:  0                Protect others around you: Learn how to safely use, store and throw away your medicines at www.disposemymeds.org.             Medication List: This is a list of all your medications and when to take them. Check marks below indicate your daily home schedule. Keep this list as a reference.      Medications           Morning Afternoon Evening Bedtime As Needed    levETIRAcetam 1000 MG Tabs   Take 1 tablet by mouth daily   Last time this was given:  1,000 mg on 8/8/2018  7:48 AM                                metroNIDAZOLE 0.75 % topical gel   Commonly known as:  METROGEL   Apply topically 2 times daily Apply to nose   Last time this was given:  8/8/2018  7:48 AM

## 2018-08-06 ENCOUNTER — APPOINTMENT (OUTPATIENT)
Dept: GENERAL RADIOLOGY | Facility: CLINIC | Age: 53
End: 2018-08-06
Attending: THORACIC SURGERY (CARDIOTHORACIC VASCULAR SURGERY)
Payer: COMMERCIAL

## 2018-08-06 LAB
ALBUMIN SERPL-MCNC: 3.6 G/DL (ref 3.4–5)
ALP SERPL-CCNC: 86 U/L (ref 40–150)
ALT SERPL W P-5'-P-CCNC: 32 U/L (ref 0–70)
ANION GAP SERPL CALCULATED.3IONS-SCNC: 9 MMOL/L (ref 3–14)
AST SERPL W P-5'-P-CCNC: 41 U/L (ref 0–45)
BASOPHILS # BLD AUTO: 0 10E9/L (ref 0–0.2)
BASOPHILS NFR BLD AUTO: 0.2 %
BILIRUB SERPL-MCNC: 2.2 MG/DL (ref 0.2–1.3)
BUN SERPL-MCNC: 16 MG/DL (ref 7–30)
CALCIUM SERPL-MCNC: 8.9 MG/DL (ref 8.5–10.1)
CHLORIDE SERPL-SCNC: 103 MMOL/L (ref 94–109)
CO2 SERPL-SCNC: 25 MMOL/L (ref 20–32)
CREAT SERPL-MCNC: 0.69 MG/DL (ref 0.66–1.25)
CRP SERPL-MCNC: 7.8 MG/L (ref 0–8)
DIFFERENTIAL METHOD BLD: ABNORMAL
EOSINOPHIL # BLD AUTO: 0.4 10E9/L (ref 0–0.7)
EOSINOPHIL NFR BLD AUTO: 5.5 %
ERYTHROCYTE [DISTWIDTH] IN BLOOD BY AUTOMATED COUNT: 13.2 % (ref 10–15)
GFR SERPL CREATININE-BSD FRML MDRD: >90 ML/MIN/1.7M2
GLUCOSE SERPL-MCNC: 90 MG/DL (ref 70–99)
HCT VFR BLD AUTO: 40.4 % (ref 40–53)
HGB BLD-MCNC: 13.4 G/DL (ref 13.3–17.7)
IMM GRANULOCYTES # BLD: 0 10E9/L (ref 0–0.4)
IMM GRANULOCYTES NFR BLD: 0.2 %
LYMPHOCYTES # BLD AUTO: 1.6 10E9/L (ref 0.8–5.3)
LYMPHOCYTES NFR BLD AUTO: 24.8 %
MCH RBC QN AUTO: 32.4 PG (ref 26.5–33)
MCHC RBC AUTO-ENTMCNC: 33.2 G/DL (ref 31.5–36.5)
MCV RBC AUTO: 98 FL (ref 78–100)
MONOCYTES # BLD AUTO: 1 10E9/L (ref 0–1.3)
MONOCYTES NFR BLD AUTO: 15.6 %
NEUTROPHILS # BLD AUTO: 3.5 10E9/L (ref 1.6–8.3)
NEUTROPHILS NFR BLD AUTO: 53.7 %
NRBC # BLD AUTO: 0 10*3/UL
NRBC BLD AUTO-RTO: 0 /100
PLATELET # BLD AUTO: 133 10E9/L (ref 150–450)
POTASSIUM SERPL-SCNC: 3.7 MMOL/L (ref 3.4–5.3)
PROCALCITONIN SERPL-MCNC: 0.06 NG/ML
PROT SERPL-MCNC: 7.2 G/DL (ref 6.8–8.8)
RBC # BLD AUTO: 4.13 10E12/L (ref 4.4–5.9)
SODIUM SERPL-SCNC: 137 MMOL/L (ref 133–144)
WBC # BLD AUTO: 6.4 10E9/L (ref 4–11)

## 2018-08-06 PROCEDURE — 36415 COLL VENOUS BLD VENIPUNCTURE: CPT | Performed by: INTERNAL MEDICINE

## 2018-08-06 PROCEDURE — 87385 HISTOPLASMA CAPSUL AG IA: CPT | Performed by: INTERNAL MEDICINE

## 2018-08-06 PROCEDURE — 99232 SBSQ HOSP IP/OBS MODERATE 35: CPT | Performed by: INTERNAL MEDICINE

## 2018-08-06 PROCEDURE — 12000007 ZZH R&B INTERMEDIATE

## 2018-08-06 PROCEDURE — 86140 C-REACTIVE PROTEIN: CPT | Performed by: INTERNAL MEDICINE

## 2018-08-06 PROCEDURE — 25000132 ZZH RX MED GY IP 250 OP 250 PS 637: Performed by: INTERNAL MEDICINE

## 2018-08-06 PROCEDURE — 85025 COMPLETE CBC W/AUTO DIFF WBC: CPT | Performed by: INTERNAL MEDICINE

## 2018-08-06 PROCEDURE — 80053 COMPREHEN METABOLIC PANEL: CPT | Performed by: INTERNAL MEDICINE

## 2018-08-06 PROCEDURE — G0378 HOSPITAL OBSERVATION PER HR: HCPCS

## 2018-08-06 PROCEDURE — 84145 PROCALCITONIN (PCT): CPT | Performed by: INTERNAL MEDICINE

## 2018-08-06 PROCEDURE — 71045 X-RAY EXAM CHEST 1 VIEW: CPT

## 2018-08-06 RX ADMIN — LEVETIRACETAM 1000 MG: 500 TABLET, FILM COATED ORAL at 08:23

## 2018-08-06 RX ADMIN — METRONIDAZOLE: 7.5 GEL TOPICAL at 08:23

## 2018-08-06 RX ADMIN — METRONIDAZOLE: 7.5 GEL TOPICAL at 19:42

## 2018-08-06 ASSESSMENT — ACTIVITIES OF DAILY LIVING (ADL)
ADLS_ACUITY_SCORE: 13
ADLS_ACUITY_SCORE: 13

## 2018-08-06 NOTE — PROGRESS NOTES
[facility]    Hospitalist Progress Note    Assessment & Plan   Jorge L Townsend is a 52 year old male w hx of bilateral pneumothoraces requiring thoracic surgical pleuredesis 20+ years ago as well as a 20 year smoking hx who presents with SOB, found to have R sided pneumothorax     Pneumothorax  New consolidation/mass  Patient had imaging on presentation which showed a right pneumothorax with mass effect consistent with tension pneumothorax.  There were also 2 areas of focal parenchymal density in the right upper lobe that may be focal infection or inflammation but neoplasm can't be rule out.  Dr. Osorio was consulted in the ED and a chest tube was placed.   - Thoracic surgery consulted and appreciate their recommendations  - O2 as needed  - PRN Tylenol and Morphine     Productive cough.  Resolved  Suspect his is related to his pneumothorax as has resolved with chest tube placement.    - Procalcitonin, CRP, histoplasma, legionella and sputum culture all ordered      # Pain Assessment:  Current Pain Score 8/6/2018   Patient currently in pain? denies   Pain score (0-10) -   Jorge L márquez pain level was assessed and he currently denies pain.        DVT Prophylaxis: Pneumatic Compression Devices  Code Status: Full Code    Disposition: Expected discharge TBD.  Chest tube still in place and awaiting formal thoracic surgery evaluation.     Stephen Willis, DO  Text Page (7am to 6pm)    Interval History   Patient seen and examined.  SOB and productive cough have both resolved with chest tube placement.  Some irritation at chest tube site but denies pain    -Data reviewed today: I reviewed all new labs and imaging results over the last 24 hours. I personally reviewed   CXR:  Right sided pig tail catheter in place.  No obvious pneumothorax.  Hyperinflated lungs.  No cardiomegaly     Physical Exam   Temp: 98.1  F (36.7  C) Temp src: Oral BP: (!) 133/99 Pulse: 92 Heart Rate: 88 Resp: 16 SpO2: 93 % O2 Device: None (Room  air) Oxygen Delivery: 1 LPM  Vitals:    08/05/18 1420   Weight: 79.4 kg (175 lb)     Vital Signs with Ranges  Temp:  [96.3  F (35.7  C)-99  F (37.2  C)] 98.1  F (36.7  C)  Pulse:  [92] 92  Heart Rate:  [87-93] 88  Resp:  [16-20] 16  BP: (129-154)/() 133/99  SpO2:  [92 %-98 %] 93 %  I/O last 3 completed shifts:  In: 240 [P.O.:240]  Out: 77 [Chest Tube:77]    Constitutional: Awake, alert, cooperative, no apparent distress  Respiratory: Clear to auscultation bilaterally, no crackles or wheezing  Cardiovascular: Regular rate and rhythm, normal S1 and S2, and no murmur noted.  Chest tube with serosanguinous drainage  GI: Normal bowel sounds, soft, non-distended, non-tender  Skin/Integumen: No rashes, no cyanosis  MSK: No edema     Medications       levETIRAcetam  1,000 mg Oral Daily     metroNIDAZOLE   Topical BID     ondansetron  4 mg Intravenous Once       Data     Recent Labs  Lab 08/05/18  1433   WBC 6.0   HGB 13.3   MCV 99   *   INR 0.95      POTASSIUM 4.6   CHLORIDE 105   CO2 27   BUN 15   CR 0.76   ANIONGAP 7   MIKE 9.1   GLC 94       Imaging:   Recent Results (from the past 24 hour(s))   Chest CT w/o contrast   Result Value    Radiologist flags Right tension pneumothorax (AA)    Narrative    CT CHEST WITHOUT CONTRAST  8/5/2018 3:08 PM    HISTORY: History of bilateral pneumothorax and pleurodesis, now  loculated pneumo on right.     TECHNIQUE: Axial images from thoracic inlet to diaphragm. Noncontrast  technique. Radiation dose for this scan was reduced using automated  exposure control, adjustment of the mA and/or kV according to patient  size, or iterative reconstruction technique.    COMPARISON: 9/28/2006 chest CT.    FINDINGS:   Chest: Large right pneumothorax. There is depression of the right  hemidiaphragm and some mass effect on the right heart margin  consistent with tension pneumothorax. Trachea is midline. There are  thin-walled blebs and bulla at the right lung apex and  emphysematous  changes in the lungs. There is an irregular elongated opacity in the  right upper lobe series 3 image 15, 2.3 x 1.0 x 2.4 cm in size which  is nonspecific but at least moderately concerning for neoplasm. Focal  infection or inflammation/organizing pneumonia could have this  appearance. There is more inferior lateral opacity in the right mid  lung (image 29 series 3) 2.8 x 2.8 x 2.1 cm in size with cystic areas  or bronchiectasis. There is thickening consistent with atelectasis  along the posterior inferior right lung margin.    The largest area of pneumothorax is at the lower right chest  approximately 6.8 cm craniocaudal dimension on coronal image 30. There  is a band of pneumothorax posterior medial to the right lung extending  from the apex to the lower chest 22 cm in craniocaudal length, 12 cm  in transverse oblique dimension at the subcarinal level and 3.6 cm in  AP dimension. A portion of right upper and mid lung still extends to  the lateral pleural surface and may be adhered or scarred preventing  it from collapsing completely.    No evidence for left pneumothorax. Postoperative changes at the left  apex. No acute infiltrate on the left. Tiny indeterminate apical  nodule.    Numerous small mediastinal nodes. No pleural effusion. Hypodense  posterior superior right kidney lesion consistent with probable cyst.  No aggressive bone lesions.      Impression    IMPRESSION:  1. Right pneumothorax predominantly at the lower right hemithorax and  posterior medial right chest with mass effect consistent with tension  pneumothorax.  2. Two areas of focal parenchymal density in the right upper lobe may  be focal infection or inflammation but could also represent neoplasm  particularly the more superior spiculated density.  3. Emphysematous changes.    [Critical Result: Right tension pneumothorax]  Finding was identified on 8/5/2018 3:10 PM.   Dr. Boo was contacted by me on 8/5/2018 3:13 PM and  verbalized  understanding of the critical result.    KELLI JAMIL MD   CT Chest Tube with Cath Placement    Narrative    CT GUIDED CHEST TUBE PLACEMENT       HISTORY:  Pneumothorax.    COMPARISON: Chest CT dated 5/20/2018.     TECHNIQUE:  After obtaining informed consent, the patient was placed  in a supine position on the CT table. The right lower thorax was  prepped and draped in the usual sterile manner. 1% lidocaine was  injected for local anesthesia. Under CT guidance, access into the  pleural space was obtained using a Yueh needle. A wire was curled in  the pleural space. Over the wire, a 14 Armenian pigtail catheter was  placed. Air was suctioned out. Follow-up CT showed significant  resolution in pneumothorax. There is also decrease in pleural air  within the posterior right thorax. The tube was sutured to the  patient's skin and hooked to a Pleur-evac suction apparatus.    I determined this patient to be an appropriate candidate for the  planned sedation and procedure and reassessed the patient immediately  prior to sedation and procedure. The patient tolerated the procedure  well. There were no immediate postprocedure complications. The  patient's vital signs were monitored by radiology nursing staff under  my supervision and remained stable throughout the study.     MEDICATIONS: 1 mg Versed, 50 mg fentanyl    Sedation time: 30 minutes      Impression    IMPRESSION: CT-guided chest tube placed as above.     KALYN BRITO MD   XR Chest 2 Views    Narrative    CHEST TWO VIEWS  8/5/2018 6:59 PM     HISTORY: Post chest tube placement.      Impression    IMPRESSION: Left pigtail chest tube is in place medially at the right  lung base. Emphysematous changes are noted within the lungs. No  apparent pleural effusion or pneumothorax. Sutures are noted at the  right lung apex. There is opacity in the right upper lobe which may  represent residual atelectasis or fibrosis but is nonspecific.     BONI HAYS MD    XR Chest Port 1 View    Narrative    XR CHEST PORT 1 VW 8/6/2018 5:08 AM    COMPARISON: 8/5/2018    HISTORY: Pneumothorax, chest tube placement.      Impression    IMPRESSION: RIGHT medial basilar small bore pigtail chest tube remains  in place. Apical wedge resection changes seen on both sides. Bilateral  emphysema again seen. No definite residual pneumothorax.    ROGE DE LA CRUZ MD

## 2018-08-06 NOTE — PLAN OF CARE
Problem: Patient Care Overview  Goal: Plan of Care/Patient Progress Review  Outcome: No Change  A/O x4. AVSS on RA. Ambulating halls SBA. Pt denies pain. LS diminished. CT to -20 suction, intermittent AL, no crepitus, Ok to walk on waterseal per pt care order. Tolerating regular diet. Voiding.

## 2018-08-06 NOTE — PROGRESS NOTES
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 Discussed possible scenarios including awaiting resolution of air leak (CT in place currently) as well as surgical intervention if the air leak does not fina. Surgery would be more complicated due to hx of thoracotomy-- could attempt a VATS first but almost assuredly would need redo thoracotomy. Lungs with significant emphysematous changes. CT chest 8/5/18 also shows two areas of nodularity in th right upper lobe lung which could represent malignancy-- if VATS/thoracotomy then we could potentially wedge out both areas vs if no surgical intervention then possible CT-guided biopsy of nodules versus furutre surveillance scanning. Note that one area (more superior nodule) of RUL nodularity was present on CT chest 2006.   P: Keep CT to suction- OK to walk on water seal- will continue to follow  Formal consult note to follow    Drea Vilchis PA-C with Dr. Lamont Osorio  [facility]  Cell [phone]            "

## 2018-08-06 NOTE — PROGRESS NOTES
Pt arrived to station 33. Vitals stable. 1L O2, will continue to wean. CT intact to suction. Denies pain. Assist of 1.

## 2018-08-07 ENCOUNTER — APPOINTMENT (OUTPATIENT)
Dept: GENERAL RADIOLOGY | Facility: CLINIC | Age: 53
End: 2018-08-07
Attending: THORACIC SURGERY (CARDIOTHORACIC VASCULAR SURGERY)
Payer: COMMERCIAL

## 2018-08-07 PROCEDURE — 12000007 ZZH R&B INTERMEDIATE

## 2018-08-07 PROCEDURE — 99232 SBSQ HOSP IP/OBS MODERATE 35: CPT | Performed by: INTERNAL MEDICINE

## 2018-08-07 PROCEDURE — 71045 X-RAY EXAM CHEST 1 VIEW: CPT

## 2018-08-07 PROCEDURE — 25000132 ZZH RX MED GY IP 250 OP 250 PS 637: Performed by: INTERNAL MEDICINE

## 2018-08-07 RX ADMIN — METRONIDAZOLE: 7.5 GEL TOPICAL at 20:41

## 2018-08-07 RX ADMIN — LEVETIRACETAM 1000 MG: 500 TABLET, FILM COATED ORAL at 07:45

## 2018-08-07 RX ADMIN — METRONIDAZOLE: 7.5 GEL TOPICAL at 07:46

## 2018-08-07 ASSESSMENT — ACTIVITIES OF DAILY LIVING (ADL)
ADLS_ACUITY_SCORE: 13

## 2018-08-07 NOTE — PROGRESS NOTES
[facility]    Hospitalist Progress Note    Assessment & Plan   Jorge L Townsend is a 52 year old male w hx of bilateral pneumothoraces requiring thoracic surgical pleuredesis 20+ years ago as well as a 20 year smoking hx who presents with SOB, found to have R sided pneumothorax      Pneumothorax  Lung nodules  Patient had imaging on presentation which showed a right pneumothorax with mass effect consistent with tension pneumothorax.  There were also 2 areas of focal parenchymal density in the right upper lobe that may be focal infection or inflammation but neoplasm can't be rule out.  Dr. Osorio was consulted in the ED and a chest tube was placed.   - Thoracic surgery consulted and appreciate their recommendations   - Plan for outpatient PET/CT for further evaluation   - O2 as needed  - PRN Tylenol and Morphine      Productive cough.  Resolved  Suspect his is related to his pneumothorax as has resolved with chest tube placement.    - Procalcitonin, CRP, histoplasma, legionella and sputum culture all ordered    # Pain Assessment:  Current Pain Score 8/7/2018   Patient currently in pain? denies   Pain score (0-10) -   Jorge L márquez pain level was assessed and he currently denies pain.        DVT Prophylaxis: Pneumatic Compression Devices  Code Status: Full Code    Disposition: Expected discharge in 1-2 days once cleared by thoracic surgery.    Stephen Willis, DO  Text Page (7am to 6pm)    Interval History   Patient seen and examined.  Still no shortness of breath or recurrence of cough.  No pain currently.  No fevers or chills.    -Data reviewed today: I reviewed all new labs and imaging results over the last 24 hours. I personally reviewed   CXR:  Right chest tube.  No obvious pneumothorax    Physical Exam   Temp: 98  F (36.7  C) Temp src: Oral BP: (!) 133/95 Pulse: 76 Heart Rate: 75 Resp: 16 SpO2: 93 % O2 Device: None (Room air)    Vitals:    08/05/18 1420   Weight: 79.4 kg (175 lb)     Vital Signs with  Ranges  Temp:  [98  F (36.7  C)-98.9  F (37.2  C)] 98  F (36.7  C)  Pulse:  [76-94] 76  Heart Rate:  [68-94] 75  Resp:  [16] 16  BP: (119-140)/(54-96) 133/95  SpO2:  [93 %-96 %] 93 %  I/O last 3 completed shifts:  In: 315 [P.O.:315]  Out: 55 [Chest Tube:55]    Constitutional: Awake, alert, cooperative, no apparent distress  Respiratory: Clear to auscultation bilaterally, no crackles or wheezing  Cardiovascular: Regular rate and rhythm, normal S1 and S2, and no murmur noted  GI: Normal bowel sounds, soft, non-distended, non-tender  Skin/Integumen: No rashes, no cyanosis  MSK: Chest tube in place.  No edema     Medications       levETIRAcetam  1,000 mg Oral Daily     metroNIDAZOLE   Topical BID     ondansetron  4 mg Intravenous Once       Data     Recent Labs  Lab 08/06/18  1100 08/05/18  1433   WBC 6.4 6.0   HGB 13.4 13.3   MCV 98 99   * 134*   INR  --  0.95    139   POTASSIUM 3.7 4.6   CHLORIDE 103 105   CO2 25 27   BUN 16 15   CR 0.69 0.76   ANIONGAP 9 7   MIKE 8.9 9.1   GLC 90 94   ALBUMIN 3.6  --    PROTTOTAL 7.2  --    BILITOTAL 2.2*  --    ALKPHOS 86  --    ALT 32  --    AST 41  --        Imaging:   No results found for this or any previous visit (from the past 24 hour(s)).

## 2018-08-07 NOTE — PLAN OF CARE
Problem: Patient Care Overview  Goal: Plan of Care/Patient Progress Review  Outcome: No Change  Alert and oriented. VSS on room air. Pt resting comfortably this shift, denies pain. Chest tube with intermittent air leak, no crepitus.

## 2018-08-07 NOTE — CONSULTS
Thoracic Surgery Consult Note:    Jorge L Townsend  1965   [id number]    Date of Admission: 8/5/2018  1:55 PM  Date of Consult: 8/6/2018     CC: Pneumothorax on right [J93.9]    Referring Provider: Piedad Moncada MD    Consulting Thoracic Surgeon: Lamont Osorio MD    ASSESSMENT:   1) Recurrent right pneumothorax in patient with hx of bilateral thoracotomies in 1980s for bilateral pneumothoraces.   2) Two right upper lobe lung nodules:  Superior-most nodule was present on CT chest in 2006 but second RUL nodular area is new since then. These nodules are indeterminate, but could represent malignancies, scarring from previous bullectomy in 1980s, infection (although no current symptoms or labs compatible such) or ??. Will need possible resection if we end up operating, biopsy or at the least active surveillance in the near future (3 months).    PLAN:  1) Continue chest tube to -20 cm H20 suction- OK to ambulate on water seal  2) Pain control-- meds in place  3) IS, Aerobika, ambulation  4) Await resolution of air leak. If air leak persists, then further surgical intervention may be indicated. Surgery would be more complicated due to hx of thoracotomy-- could attempt a VATS first but almost assuredly would need redo thoracotomy. Lungs with significant emphysematous changes. CT chest 8/5/18 also shows two areas of nodularity in the right upper lobe lung which could represent malignancy-- if VATS/thoracotomy then we could potentially wedge out both areas vs if no surgical intervention then possible CT-guided biopsy of nodules versus future surveillance scanning. Note that one area (more superior nodule) of RUL nodularity was present on CT chest 2006.       History of the Present Illness: Jorge L is a 52-year-old male with past medical history of COPD, bilateral PTX with bilateral thoracotomies and apical blebectomies in the 1980s, who presented to the [facility] ER with shortness of breath and cough who was found to  "have a recurrent right pneumothorax. Jorge L has noted a moderate cough productive of whitish sputum the past few weeks along with recent dyspnea with exertion. His dyspnea improves somewhat with rest but it got so severe at work yesterday that he presented to the [facility] for evaluation. Not constantly dyspneic but overall worsening each day. No recent cough with colored sputum, fevers, chills, weight loss, hemoptysis, abdominal pain. 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 Moderate distress with dyspnea on exertion. No recent sick contacts. No trauma.    ROS: A 12-point review of systems was performed and negative except as noted in the HPI above.     Past Medical History:  Past Medical History:   Diagnosis Date     Alcohol dependence (H) 3/2/2015     Back pain      CARDIOVASCULAR SCREENING; LDL GOAL LESS THAN 160      Essential tremor      Other chronic nonalcoholic liver disease 3/2/2015     Pneumothorax     1983's        Past Surgical History:  Past Surgical History:   Procedure Laterality Date     THORACIC SURGERY         Family History:  Family History   Problem Relation Age of Onset     Cancer Father      Cerebrovascular Disease Sister      Diabetes No family hx of      Hypertension No family hx of      Thyroid Disease No family hx of      Glaucoma No family hx of      Macular Degeneration No family hx of        Medications:  No current outpatient prescriptions on file.       S: Lying in bed comfortably but expresses disappointment that his right lung collapsed again after so many years. Not currently dyspneic, pain well-controlled.     O: /76 (BP Location: Right arm)  Pulse 76  Temp 98.8  F (37.1  C) (Oral)  Resp 16  Ht 1.956 m (6' 5\")  Wt 79.4 kg (175 lb)  SpO2 96%  BMI 20.75 kg/m2 on room air    CT: moderate air leak present, no bleeding, serous output    Imaging:  Recent Results (from the past 48 hour(s)) "   Chest CT w/o contrast   Result Value    Radiologist flags Right tension pneumothorax (AA)    Narrative    CT CHEST WITHOUT CONTRAST  8/5/2018 3:08 PM    HISTORY: History of bilateral pneumothorax and pleurodesis, now  loculated pneumo on right.     TECHNIQUE: Axial images from thoracic inlet to diaphragm. Noncontrast  technique. Radiation dose for this scan was reduced using automated  exposure control, adjustment of the mA and/or kV according to patient  size, or iterative reconstruction technique.    COMPARISON: 9/28/2006 chest CT.    FINDINGS:   Chest: Large right pneumothorax. There is depression of the right  hemidiaphragm and some mass effect on the right heart margin  consistent with tension pneumothorax. Trachea is midline. There are  thin-walled blebs and bulla at the right lung apex and emphysematous  changes in the lungs. There is an irregular elongated opacity in the  right upper lobe series 3 image 15, 2.3 x 1.0 x 2.4 cm in size which  is nonspecific but at least moderately concerning for neoplasm. Focal  infection or inflammation/organizing pneumonia could have this  appearance. There is more inferior lateral opacity in the right mid  lung (image 29 series 3) 2.8 x 2.8 x 2.1 cm in size with cystic areas  or bronchiectasis. There is thickening consistent with atelectasis  along the posterior inferior right lung margin.    The largest area of pneumothorax is at the lower right chest  approximately 6.8 cm craniocaudal dimension on coronal image 30. There  is a band of pneumothorax posterior medial to the right lung extending  from the apex to the lower chest 22 cm in craniocaudal length, 12 cm  in transverse oblique dimension at the subcarinal level and 3.6 cm in  AP dimension. A portion of right upper and mid lung still extends to  the lateral pleural surface and may be adhered or scarred preventing  it from collapsing completely.    No evidence for left pneumothorax. Postoperative changes at the  left  apex. No acute infiltrate on the left. Tiny indeterminate apical  nodule.    Numerous small mediastinal nodes. No pleural effusion. Hypodense  posterior superior right kidney lesion consistent with probable cyst.  No aggressive bone lesions.      Impression    IMPRESSION:  1. Right pneumothorax predominantly at the lower right hemithorax and  posterior medial right chest with mass effect consistent with tension  pneumothorax.  2. Two areas of focal parenchymal density in the right upper lobe may  be focal infection or inflammation but could also represent neoplasm  particularly the more superior spiculated density.  3. Emphysematous changes.    [Critical Result: Right tension pneumothorax]  Finding was identified on 8/5/2018 3:10 PM.   Dr. Boo was contacted by me on 8/5/2018 3:13 PM and verbalized  understanding of the critical result.    KELLI JAMIL MD   CT Chest Tube with Cath Placement    Narrative    CT GUIDED CHEST TUBE PLACEMENT       HISTORY:  Pneumothorax.    COMPARISON: Chest CT dated 5/20/2018.     TECHNIQUE:  After obtaining informed consent, the patient was placed  in a supine position on the CT table. The right lower thorax was  prepped and draped in the usual sterile manner. 1% lidocaine was  injected for local anesthesia. Under CT guidance, access into the  pleural space was obtained using a Yueh needle. A wire was curled in  the pleural space. Over the wire, a 14 Tajik pigtail catheter was  placed. Air was suctioned out. Follow-up CT showed significant  resolution in pneumothorax. There is also decrease in pleural air  within the posterior right thorax. The tube was sutured to the  patient's skin and hooked to a Pleur-evac suction apparatus.    I determined this patient to be an appropriate candidate for the  planned sedation and procedure and reassessed the patient immediately  prior to sedation and procedure. The patient tolerated the procedure  well. There were no immediate  postprocedure complications. The  patient's vital signs were monitored by radiology nursing staff under  my supervision and remained stable throughout the study.     MEDICATIONS: 1 mg Versed, 50 mg fentanyl    Sedation time: 30 minutes      Impression    IMPRESSION: CT-guided chest tube placed as above.     KALYN BRITO MD   XR Chest 2 Views    Narrative    CHEST TWO VIEWS  8/5/2018 6:59 PM     HISTORY: Post chest tube placement.      Impression    IMPRESSION: Left pigtail chest tube is in place medially at the right  lung base. Emphysematous changes are noted within the lungs. No  apparent pleural effusion or pneumothorax. Sutures are noted at the  right lung apex. There is opacity in the right upper lobe which may  represent residual atelectasis or fibrosis but is nonspecific.     BONI HAYS MD   XR Chest Port 1 View    Narrative    XR CHEST PORT 1 VW 8/6/2018 5:08 AM    COMPARISON: 8/5/2018    HISTORY: Pneumothorax, chest tube placement.      Impression    IMPRESSION: RIGHT medial basilar small bore pigtail chest tube remains  in place. Apical wedge resection changes seen on both sides. Bilateral  emphysema again seen. No definite residual pneumothorax.    ROGE DE LA CRUZ MD     Radiology review: I personally reviewed all images individually as well as with Dr. Osorio. Initial CXR on 8/5/18 shows a moderate right PTX. CT chest 8/5/18 shows moderate to large right PTX with two areas of nodularity in RUL lung that are indeterminate but could represent malignancy versus scarring from previous apical bullectomy versus infection. Pigtail cathter in good position on the right. CXR 8/06 demonstrates resolution of R PTX, and stable emphysematous changes bilaterally.    Labs: procalcintonin 0.06, WBC 6.4    Discussed the above plan of care with patient today. Orders left.  We will continue to follow with you.  Thank you for allowing us to participate in the care of this patient and please call if there are further  questions or concerns.    Time dedicated to Consultation: 30  minutes were spent at bedside, floor and unit with greater than 50% of the time spent on patient counseling and education (possible surgical vs conservative approaches to surgical intervention as well as reasons why it would be technically difficult as well as general anticipated post-op restrictions), radiology review and coordination of care.    Drea Vilchis PA-C with Dr. Lamont Osorio  [facility]  Cell [phone]

## 2018-08-07 NOTE — PLAN OF CARE
Problem: Patient Care Overview  Goal: Plan of Care/Patient Progress Review  Outcome: Improving  Alert and oriented x4. Vital signs stable on room air. Up independently. Tolerating regular diet. Lung sounds clear. Chest tube to -20 suction, intermittent air leak, no crepitus. Patient denies pain. Bowel sounds active, + flatus and BM today. Adequate urine output.

## 2018-08-07 NOTE — PROGRESS NOTES
THORACIC SURGERY    Air leak improved  CXR  Lung expande    Discussed findings on CT chest : 2 areas of nodularity right upper lobe lung needs to be addressed    Options discussed    CT to water seal today  If tolerated and air leak persists  CT to pneumostat tomorrow and discharge  PET/CT as outpatient      PHOEBE MEDINA MD [facility] ONCOLOGY THORACIC SURGERY  CELL:  [phone]  OFFICE: [phone]

## 2018-08-07 NOTE — PLAN OF CARE
Problem: Patient Care Overview  Goal: Plan of Care/Patient Progress Review  AOx4. VSS. Pt resting comfortably this shift, denies pain. Chest tube with intermittent air leak, water suction. SBA. Regular diet. Needs to be sitting in chair when eating.

## 2018-08-07 NOTE — PLAN OF CARE
Problem: Patient Care Overview  Goal: Plan of Care/Patient Progress Review  Outcome: No Change  Pt up independently in halls, tolerates diet, denies the need for pain medications, chest tube in place, to water seal, air leak noted, no change, dressing clean dry and intact

## 2018-08-08 ENCOUNTER — APPOINTMENT (OUTPATIENT)
Dept: GENERAL RADIOLOGY | Facility: CLINIC | Age: 53
End: 2018-08-08
Attending: THORACIC SURGERY (CARDIOTHORACIC VASCULAR SURGERY)
Payer: COMMERCIAL

## 2018-08-08 VITALS
DIASTOLIC BLOOD PRESSURE: 67 MMHG | RESPIRATION RATE: 18 BRPM | HEART RATE: 78 BPM | HEIGHT: 77 IN | WEIGHT: 175 LBS | BODY MASS INDEX: 20.66 KG/M2 | TEMPERATURE: 98.1 F | OXYGEN SATURATION: 93 % | SYSTOLIC BLOOD PRESSURE: 107 MMHG

## 2018-08-08 PROCEDURE — 25000132 ZZH RX MED GY IP 250 OP 250 PS 637: Performed by: INTERNAL MEDICINE

## 2018-08-08 PROCEDURE — 71045 X-RAY EXAM CHEST 1 VIEW: CPT

## 2018-08-08 PROCEDURE — 99238 HOSP IP/OBS DSCHRG MGMT 30/<: CPT | Performed by: INTERNAL MEDICINE

## 2018-08-08 RX ADMIN — LEVETIRACETAM 1000 MG: 500 TABLET, FILM COATED ORAL at 07:48

## 2018-08-08 RX ADMIN — METRONIDAZOLE: 7.5 GEL TOPICAL at 07:48

## 2018-08-08 ASSESSMENT — ACTIVITIES OF DAILY LIVING (ADL)
ADLS_ACUITY_SCORE: 13

## 2018-08-08 NOTE — DISCHARGE SUMMARY
[facility]    Discharge Summary  Hospitalist    Date of Admission:  8/5/2018  Date of Discharge:  8/8/2018  Discharging Provider: Stephen Willis DO    Discharge Diagnoses   1. Pneumothorax  2. Lung nodules  3. Smoking history    History of Present Illness from Dr. Moncada's H&P on 8/5/18  Jorge L Townsend is a 52 year old male w distant history of pneumothoracies 20+ years ago and 20 year smoking hx (quit 20 years ago) presents with SOB. The patient reports over the last 2 weeks or so he has been experiencing SOB. +cough productive of white phlegm. No fevers, chills, n/v/d. He says he feels well at rest, just winded with exertion. He went to urgent care where he was found to have a pneumothorax and so he was sent here.    Hospital Course   Jorge L Townsend was admitted on 8/5/2018.  The following problems were addressed during his hospitalization:    Patient sent here from urgent care due to pneumothorax.  A chest tube was placed and thoracic surgery helped manage.  Of note, patient did have lung nodules seen on CT scan with plans to follow up with outpatient PET/CT for further evaluation with thoracic surgery.  On day of discharge chest tube changed to pneumostat and patient was sent him with instructions by surgery and close follow up.    # Discharge Pain Plan:   - Patient currently has NO PAIN and is not being prescribed pain medications on discharge.      Stephen Willis DO    Significant Results and Procedures   See below    Pending Results   These results will be followed up by thoracic surgery  Unresulted Labs Ordered in the Past 30 Days of this Admission     Date and Time Order Name Status Description    8/6/2018 0500 Histoplasma capsulatum antigen In process           Code Status   Full Code       Primary Care Physician   Oskar Arthur    Physical Exam   Temp: 98.1  F (36.7  C) Temp src: Oral BP: 107/67 Pulse: 78 Heart Rate: 78 Resp: 18 SpO2: 93 % O2 Device: None (Room air)    Vitals:     08/05/18 1420   Weight: 79.4 kg (175 lb)     Vital Signs with Ranges  Temp:  [97.6  F (36.4  C)-98.8  F (37.1  C)] 98.1  F (36.7  C)  Pulse:  [78] 78  Heart Rate:  [77-80] 78  Resp:  [18] 18  BP: (107-126)/(67-94) 107/67  SpO2:  [93 %-96 %] 93 %  I/O last 3 completed shifts:  In: 600 [P.O.:600]  Out: 2 [Chest Tube:2]    Constitutional: Resting comfortably in bed.  NAD  Eyes: EOMI  ENT: Moist mucous membranes  Respiratory: Clear to auscultation.  No respiratory distress  Cardiovascular: RRR.  No murmurs  GI: Bowel sounds present.  No tenderness   Musculoskeletal: No edema     Discharge Disposition   Discharged to home  Condition at discharge: Stable    Consultations This Hospital Stay   THORACIC SURGERY IP CONSULT    Time Spent on this Encounter   I, Stephen Willis, personally saw the patient today and spent less than or equal to 30 minutes discharging this patient.    Discharge Orders     PET Oncology Whole Body     When to contact your care team   Call Dr Osorio office at [phone] if you have any of the following:   --temperature greater than 101.5F,  --increasing shortness of breath,  --increased swelling or purulent discharge at incision site,  --if your air leak appears to have sealed off  --Or any other questions or concerns.     Activity   Your activity upon discharge:   --Activity as tolerated,  --Check the portable chest tube box 1-2 times per day for air leak.   --Do not shower until you have been cleared to do so by Thoracic Surgery (only sponge baths for now)  --Use the incentive spirometer at least 10 times per day     Tubes and drains   PNEUMOSTAT  1) Pneumostat drain in place. Please keep the box upright as much as possible to prevent fluid from leaking out of the portable atrium.   2) Make sure tube stays taped down to your skin so that it does not get pulled out.    3) Empty fluid in the pneumostat daily or as needed with luer lock syringe through the port on the bottom of the Atrium. Cleanse  "port with alcohol wipe prior to attaching syringe to port. You can put the drainage in the toilet.   4) Check for an airleak daily by placing a few drops of the provided sterile saline into the cup on the front of Pneumostat and coughing. If the water sputters out and air bubbles are seen, an airleak is still present.  If water remains quiet and does not show any air bubbles, your air leak has probably healed.  If no airleak seen, please call Dr. Osorio' office at the number below for further instructions.  5) Do not clamp tube (turn the stopcock on the tubing) unless instructed to do so by Thoracic Surgery team.   6) Call Dr. Osorio' office at [phone] with any questions or concerns. Or may call [employer] with device questions at [phone].    NOTE:  When it appears your air leak has stopped, call Thoracic Surgery at [phone].  We will likely ask you to do what is called a \"clamping trial\" where you will turn the stopcock 90 degrees effectively sealing it off the chest tube.  You will leave it \"clamp\" for 6-12 hours (or as instructed by the nurse) and will likely have a follow-up x-ray to make sure your lung has not collapsed back down.  If your lung has remained up despite clamping the tube, it means the leak has completely sealed and we can remove your chest tube.  Dr Osorio nurse will give you instructions on when to come in to have it removed.     Reason for your hospital stay   Pneumothorax     Follow-up and recommended labs and tests    Follow up with primary care provider, Oskar Arthur, within 2-4 weeks, for hospital follow- up. No follow up labs or test are needed.  Follow up with thoracic surgery as planned.  Patient needs a PET/CT as discussed with Dr. Osorio     Activity   Your activity upon discharge: activity as tolerated     Full Code     Diet   Follow this diet upon discharge: Orders Placed This Encounter     Regular Diet Adult       Discharge Medications   Current " Discharge Medication List      CONTINUE these medications which have NOT CHANGED    Details   levETIRAcetam 1000 MG TABS Take 1 tablet by mouth daily      metroNIDAZOLE (METROGEL) 0.75 % topical gel Apply topically 2 times daily Apply to nose           Allergies   No Known Allergies  Data   Most Recent 3 CBC's:  Recent Labs   Lab Test  08/06/18   1100  08/05/18   1433  01/20/16   1340   WBC  6.4  6.0  7.8   HGB  13.4  13.3  14.0   MCV  98  99  102*   PLT  133*  134*  371      Most Recent 3 BMP's:  Recent Labs   Lab Test  08/06/18   1100  08/05/18   1433  01/11/16   0840   NA  137  139  136   POTASSIUM  3.7  4.6  3.8   CHLORIDE  103  105  102   CO2  25  27  25   BUN  16  15  8   CR  0.69  0.76  0.70   ANIONGAP  9  7  9   MIKE  8.9  9.1  8.5   GLC  90  94  84     Most Recent 2 LFT's:  Recent Labs   Lab Test  08/06/18   1100  01/20/16   1340   AST  41  91*   ALT  32  94*   ALKPHOS  86  85   BILITOTAL  2.2*  0.7     Most Recent INR's and Anticoagulation Dosing History:  Anticoagulation Dose History     Recent Dosing and Labs Latest Ref Rng & Units 9/28/2006 8/5/2018    INR 0.86 - 1.14 0.99 0.95        Most Recent 3 Troponin's:No lab results found.  Most Recent Cholesterol Panel:  Recent Labs   Lab Test  01/22/15   1026   CHOL  184   LDL  61   HDL  112   TRIG  55     Most Recent 6 Bacteria Isolates From Any Culture (See EPIC Reports for Culture Details):No lab results found.  Most Recent TSH, T4 and A1c Labs:  Recent Labs   Lab Test  02/12/15   1550   TSH  2.72     Results for orders placed or performed during the hospital encounter of 08/05/18   Chest CT w/o contrast     Value    Radiologist flags Right tension pneumothorax (AA)    Narrative    CT CHEST WITHOUT CONTRAST  8/5/2018 3:08 PM    HISTORY: History of bilateral pneumothorax and pleurodesis, now  loculated pneumo on right.     TECHNIQUE: Axial images from thoracic inlet to diaphragm. Noncontrast  technique. Radiation dose for this scan was reduced using  automated  exposure control, adjustment of the mA and/or kV according to patient  size, or iterative reconstruction technique.    COMPARISON: 9/28/2006 chest CT.    FINDINGS:   Chest: Large right pneumothorax. There is depression of the right  hemidiaphragm and some mass effect on the right heart margin  consistent with tension pneumothorax. Trachea is midline. There are  thin-walled blebs and bulla at the right lung apex and emphysematous  changes in the lungs. There is an irregular elongated opacity in the  right upper lobe series 3 image 15, 2.3 x 1.0 x 2.4 cm in size which  is nonspecific but at least moderately concerning for neoplasm. Focal  infection or inflammation/organizing pneumonia could have this  appearance. There is more inferior lateral opacity in the right mid  lung (image 29 series 3) 2.8 x 2.8 x 2.1 cm in size with cystic areas  or bronchiectasis. There is thickening consistent with atelectasis  along the posterior inferior right lung margin.    The largest area of pneumothorax is at the lower right chest  approximately 6.8 cm craniocaudal dimension on coronal image 30. There  is a band of pneumothorax posterior medial to the right lung extending  from the apex to the lower chest 22 cm in craniocaudal length, 12 cm  in transverse oblique dimension at the subcarinal level and 3.6 cm in  AP dimension. A portion of right upper and mid lung still extends to  the lateral pleural surface and may be adhered or scarred preventing  it from collapsing completely.    No evidence for left pneumothorax. Postoperative changes at the left  apex. No acute infiltrate on the left. Tiny indeterminate apical  nodule.    Numerous small mediastinal nodes. No pleural effusion. Hypodense  posterior superior right kidney lesion consistent with probable cyst.  No aggressive bone lesions.      Impression    IMPRESSION:  1. Right pneumothorax predominantly at the lower right hemithorax and  posterior medial right chest with  mass effect consistent with tension  pneumothorax.  2. Two areas of focal parenchymal density in the right upper lobe may  be focal infection or inflammation but could also represent neoplasm  particularly the more superior spiculated density.  3. Emphysematous changes.    [Critical Result: Right tension pneumothorax]  Finding was identified on 8/5/2018 3:10 PM.   Dr. Boo was contacted by me on 8/5/2018 3:13 PM and verbalized  understanding of the critical result.    KELLI JAMIL MD   CT Chest Tube with Cath Placement    Narrative    CT GUIDED CHEST TUBE PLACEMENT       HISTORY:  Pneumothorax.    COMPARISON: Chest CT dated 5/20/2018.     TECHNIQUE:  After obtaining informed consent, the patient was placed  in a supine position on the CT table. The right lower thorax was  prepped and draped in the usual sterile manner. 1% lidocaine was  injected for local anesthesia. Under CT guidance, access into the  pleural space was obtained using a Yueh needle. A wire was curled in  the pleural space. Over the wire, a 14 Slovak pigtail catheter was  placed. Air was suctioned out. Follow-up CT showed significant  resolution in pneumothorax. There is also decrease in pleural air  within the posterior right thorax. The tube was sutured to the  patient's skin and hooked to a Pleur-evac suction apparatus.    I determined this patient to be an appropriate candidate for the  planned sedation and procedure and reassessed the patient immediately  prior to sedation and procedure. The patient tolerated the procedure  well. There were no immediate postprocedure complications. The  patient's vital signs were monitored by radiology nursing staff under  my supervision and remained stable throughout the study.     MEDICATIONS: 1 mg Versed, 50 mg fentanyl    Sedation time: 30 minutes      Impression    IMPRESSION: CT-guided chest tube placed as above.     KALYN BRITO MD   XR Chest 2 Views    Narrative    CHEST TWO VIEWS  8/5/2018 6:59  PM     HISTORY: Post chest tube placement.      Impression    IMPRESSION: Left pigtail chest tube is in place medially at the right  lung base. Emphysematous changes are noted within the lungs. No  apparent pleural effusion or pneumothorax. Sutures are noted at the  right lung apex. There is opacity in the right upper lobe which may  represent residual atelectasis or fibrosis but is nonspecific.     BONI HAYS MD   XR Chest Port 1 View    Narrative    XR CHEST PORT 1 VW 8/6/2018 5:08 AM    COMPARISON: 8/5/2018    HISTORY: Pneumothorax, chest tube placement.      Impression    IMPRESSION: RIGHT medial basilar small bore pigtail chest tube remains  in place. Apical wedge resection changes seen on both sides. Bilateral  emphysema again seen. No definite residual pneumothorax.    ROGE DE LA CRUZ MD   XR Chest Port 1 View    Narrative    CHEST PORTABLE ONE VIEW August 7, 2018 5:54 AM     HISTORY: Pneumothorax, status post chest tube placement.    COMPARISON: 8/6/2018.      Impression    IMPRESSION: Lung volumes are unchanged. Right chest tube is in  unchanged position. Patchy perihilar, apical, and right lateral lung  opacities are unchanged. Likely chronic scarring and atelectasis. No  significant residual pneumothorax is identified. Biapical lung suture  lines are present. Heart size is unchanged.    JUSTIN ANDREA MD   XR Chest Port 1 View    Narrative    CHEST ONE VIEW PORTABLE August 8, 2018 5:25 AM     HISTORY: Pneumothorax, status post chest tube placement.     COMPARISON: 8/7/2018.      Impression    IMPRESSION: Chest catheter is unchanged. Small right pneumothorax is  now more apparent at the lung base. Staple line at the right apex in a  region of emphysema is unchanged. Scarring and bullae are also seen at  the left apex probably at the left lung base. Normal heart size and  pulmonary vascularity.    JUSTIN CABALLERO MD

## 2018-08-08 NOTE — PROGRESS NOTES
"THORACIC SURGERY    Doing well. Minimal output. Looking forward to going home.      I/O: 2 mL out over 24*.  Ongoing air leak.     /67 (BP Location: Right arm)  Pulse 78  Temp 98.1  F (36.7  C) (Oral)  Resp 18  Ht 1.956 m (6' 5\")  Wt 79.4 kg (175 lb)  SpO2 93%  BMI 20.75 kg/m2  Pleasant 51 yo gentleman   CTAB  RRR  Ongoing air leak from right pigtail chest tube in anterior chest    CT changed over to Pneumostat.  Will send home with luer lock syringe, sterile NS to check for air leak.  Instructions completed in Epic.   Needs outpatient PET/CT tomorrow to assess nodularity in lung and then to see LFJ back in clinic on Friday. Our office will call to arrange.     PING VILLASEÑOR MS, PA-C  [facility]  office: [phone]  cell: [phone]  Available M-F, 7AM-5PM      "

## 2018-08-08 NOTE — PLAN OF CARE
Problem: Patient Care Overview  Goal: Plan of Care/Patient Progress Review  Outcome: Improving  A & O x4. VSS. Up independently.  Regular diet.  Lung sounds clear. Bowel sounds active, passing gas. No c/o pain. Chest tube to water seal, intermittent air leak, no crepitus.

## 2018-08-08 NOTE — DISCHARGE INSTRUCTIONS
"    A. Patient Care:  Call Dr Osorio  office @ [phone] if you experience:  *Severe chills or a fever or 101 F or higher on two occasions  *Increased incisional pain that cannot be relieved with rest or pain medications  *Presence of unusual incisional or chest tube site drainage that is odorous, green or yellow in color, or if your incision is warm, red or swollen  *Coughing up bright red blood or greenish-yellow secretions  *Chest pain that gets worse with deep breathing or a significant increase in shortness of breath  *Inability to urinate or have a bowel movement  *New pain or swelling in your legs    In an emergency, call [phone] or have someone drive you to the nearest Emergency Department    Pain Relief:  You may have been given a prescription for narcotic pain medicine.  You may also take ibuprofen and acetaminophen either as a new prescription  or over the counter.  Recommended dosages are:  600 mg Ibuprofen every 6 hours as needed and 650 mg Acetaminophen every 4 hours as needed.  Many patients get good pain relief by \"staggering\" these medications.     Constipation:  Narcotic pain medication, general anesthesia, and time in the hospital with less activity than normal can all cause constipation. Please take a stool softener (what you have at home or one that was prescribed during hospital discharge, such as Senokot-S, docusate sodium, Miralax, Milk of Magnesia) while you are taking narcotics to prevent constipation. Stop taking the stool softener once you are done taking narcotics or if you begin having loose stools/diarrhea. Please call our clinic nurse, Beth, at [phone] if you are not having success (not having BMs) with your current stool softener.     No driving while on narcotics.       "

## 2018-08-08 NOTE — PLAN OF CARE
Problem: Patient Care Overview  Goal: Plan of Care/Patient Progress Review  Outcome: Adequate for Discharge Date Met: 08/08/18  AOx4. VSS. Denies pain. Regular diet. Pneumostat connected by PA. Pt sent home with friend, belongings and AVS.

## 2018-08-09 ENCOUNTER — TRANSFERRED RECORDS (OUTPATIENT)
Dept: HEALTH INFORMATION MANAGEMENT | Facility: CLINIC | Age: 53
End: 2018-08-09

## 2018-08-09 ENCOUNTER — TELEPHONE (OUTPATIENT)
Dept: FAMILY MEDICINE | Facility: CLINIC | Age: 53
End: 2018-08-09

## 2018-08-09 LAB — LAB SCANNED RESULT: NORMAL

## 2018-08-09 NOTE — TELEPHONE ENCOUNTER
Left message with wife to have pt call clinic to go over hospital f/u questions    Debora Barber RN  EP Triage

## 2018-08-09 NOTE — TELEPHONE ENCOUNTER
Pt was discharged form [facility] on 8/8 after being treated for an Abnormal Lung Scan, Pneumothorax On Right. Please call the pt. Thank you.  Fani Michaels,

## 2018-08-13 ENCOUNTER — TELEPHONE (OUTPATIENT)
Dept: INTERVENTIONAL RADIOLOGY/VASCULAR | Facility: CLINIC | Age: 53
End: 2018-08-13

## 2018-08-13 NOTE — TELEPHONE ENCOUNTER
Left message with wife to have pt call clinic.   She states she gave him the message last week.  Attempt #2    Debora Barber RN  EP Triage

## 2018-08-13 NOTE — TELEPHONE ENCOUNTER
"ED/Discharge Protocol    \"Hi, my name is Debora Barber, a registered nurse, and I am calling on behalf of Dr. Arthur's office at [facility].  I am calling to follow up and see how things are going for you after your recent visit.\"    \"I see that you were in the (ER/UC/IP) on 08/05/2018.    How are you doing now that you are home?\" doing alright    Is patient experiencing symptoms that may require a hospital visit?  no    Discharge Instructions    \"Let's review your discharge instructions.  What is/are the follow-up recommendations?  Pt. Response: suppose to have a biopsy today that you don't know about.  Pt does not know who the appt is with.  Is to call Dr. Osorio office for questions and f/u with thorasic surgery.    \"Were you instructed to make a follow-up appointment?\"  Pt. Response:   Yes     \"When you see the provider, I would recommend that you bring your discharge instructions with you.    Medications    \"How many new medications are you on since your hospitalization/ED visit?\"    0-1  \"How many of your current medicines changed (dose, timing, name, etc.) while you were in the hospital/ED visit?\"   0-1  \"Do you have questions about your medications?\"   No  \"Were you newly diagnosed with heart failure, COPD, diabetes or did you have a heart attack?\"   No  For patients on insulin: \"Did you start on insulin in the hospital or did you have your insulin dose changed?\"   No    Medication reconciliation completed? Yes    Was MTM referral placed (*Make sure to put transitions as reason for referral)?   No    Call Summary    \"Do you have any questions or concerns about your condition or care plan at the moment?\"    No  Triage nurse advice given: none    Patient was in ER  0 in the past year (assess appropriateness of ER visits.)      \"If you have questions or things don't continue to improve, we encourage you contact us through the main clinic number,  [phone].  Even if the clinic is not open, triage nurses are " "available 24/7 to help you.     We would like you to know that our clinic has extended hours (provide information).  We also have urgent care (provide details on closest location and hours/contact info)\"      \"Thank you for your time and take care!\"      Debora Barber RN  EP Triage    "

## 2018-08-13 NOTE — TELEPHONE ENCOUNTER
Interventional Radiology   Interventional Radiology at [facility] has been requested to perform a Right lung lesion biopsy from Dr Osorio.  Jorge L Townsend is a 52 year old male with a history of bilateral pneumothorax 20 years ago s/p pleurodesis and a 20 year past history of smoking, quit 20 years ago. He came to the ED with SOB and was found to have a large right pneumothorax. A chest tube was placed and patient was admitted to the hospital. He had a large air leak during hospitalization and chest tube remained in at discharge attached to a portable pneumostat. CT imaging and PET done 8/9 demonstrated some right lung lesions. A biopsy is requested for further evaluation and to help with treatment in the future.   Reviewed imaging and clinical information with Radiology staff Dr Wright who approved the biopsy of the right larger, more lateral lesion with CT guidance. He had reviewed with Dr Osorio last Friday 8/10/18.   A hold was put on the CT spot for tomorrow 8/14/18 at 9am and Central scheduling has contacted the patient and scheduled the biopsy.     Thanks Fatmata [facility] Interventional Radiology CNP ([phone])

## 2018-08-14 ENCOUNTER — HOSPITAL ENCOUNTER (OUTPATIENT)
Dept: CT IMAGING | Facility: CLINIC | Age: 53
End: 2018-08-14
Attending: THORACIC SURGERY (CARDIOTHORACIC VASCULAR SURGERY) | Admitting: THORACIC SURGERY (CARDIOTHORACIC VASCULAR SURGERY)
Payer: COMMERCIAL

## 2018-08-14 ENCOUNTER — APPOINTMENT (OUTPATIENT)
Dept: GENERAL RADIOLOGY | Facility: CLINIC | Age: 53
End: 2018-08-14
Attending: RADIOLOGY
Payer: COMMERCIAL

## 2018-08-14 ENCOUNTER — HOSPITAL ENCOUNTER (OUTPATIENT)
Facility: CLINIC | Age: 53
Discharge: HOME | End: 2018-08-14
Attending: THORACIC SURGERY (CARDIOTHORACIC VASCULAR SURGERY) | Admitting: THORACIC SURGERY (CARDIOTHORACIC VASCULAR SURGERY)
Payer: COMMERCIAL

## 2018-08-14 VITALS
OXYGEN SATURATION: 92 % | TEMPERATURE: 96 F | DIASTOLIC BLOOD PRESSURE: 79 MMHG | RESPIRATION RATE: 16 BRPM | SYSTOLIC BLOOD PRESSURE: 127 MMHG

## 2018-08-14 DIAGNOSIS — R91.8 LUNG NODULES: ICD-10-CM

## 2018-08-14 PROCEDURE — 88172 CYTP DX EVAL FNA 1ST EA SITE: CPT | Performed by: THORACIC SURGERY (CARDIOTHORACIC VASCULAR SURGERY)

## 2018-08-14 PROCEDURE — 77012 CT SCAN FOR NEEDLE BIOPSY: CPT

## 2018-08-14 PROCEDURE — 88173 CYTOPATH EVAL FNA REPORT: CPT | Mod: 26 | Performed by: THORACIC SURGERY (CARDIOTHORACIC VASCULAR SURGERY)

## 2018-08-14 PROCEDURE — 99152 MOD SED SAME PHYS/QHP 5/>YRS: CPT

## 2018-08-14 PROCEDURE — 40000986 XR CHEST 1 VW

## 2018-08-14 PROCEDURE — 25000128 H RX IP 250 OP 636: Performed by: RADIOLOGY

## 2018-08-14 PROCEDURE — 88172 CYTP DX EVAL FNA 1ST EA SITE: CPT | Mod: 26 | Performed by: THORACIC SURGERY (CARDIOTHORACIC VASCULAR SURGERY)

## 2018-08-14 PROCEDURE — 88173 CYTOPATH EVAL FNA REPORT: CPT | Performed by: THORACIC SURGERY (CARDIOTHORACIC VASCULAR SURGERY)

## 2018-08-14 PROCEDURE — 88305 TISSUE EXAM BY PATHOLOGIST: CPT | Performed by: THORACIC SURGERY (CARDIOTHORACIC VASCULAR SURGERY)

## 2018-08-14 PROCEDURE — 25000125 ZZHC RX 250: Performed by: THORACIC SURGERY (CARDIOTHORACIC VASCULAR SURGERY)

## 2018-08-14 PROCEDURE — 88305 TISSUE EXAM BY PATHOLOGIST: CPT | Mod: 26 | Performed by: THORACIC SURGERY (CARDIOTHORACIC VASCULAR SURGERY)

## 2018-08-14 PROCEDURE — 99153 MOD SED SAME PHYS/QHP EA: CPT

## 2018-08-14 PROCEDURE — 40000863 ZZH STATISTIC RADIOLOGY XRAY, US, CT, MAR, NM

## 2018-08-14 RX ORDER — LIDOCAINE 40 MG/G
CREAM TOPICAL
Status: DISCONTINUED | OUTPATIENT
Start: 2018-08-14 | End: 2018-08-14 | Stop reason: HOSPADM

## 2018-08-14 RX ORDER — LIDOCAINE HYDROCHLORIDE 10 MG/ML
10 INJECTION, SOLUTION EPIDURAL; INFILTRATION; INTRACAUDAL; PERINEURAL ONCE
Status: COMPLETED | OUTPATIENT
Start: 2018-08-14 | End: 2018-08-14

## 2018-08-14 RX ORDER — FLUMAZENIL 0.1 MG/ML
0.2 INJECTION, SOLUTION INTRAVENOUS
Status: DISCONTINUED | OUTPATIENT
Start: 2018-08-14 | End: 2018-08-14 | Stop reason: HOSPADM

## 2018-08-14 RX ORDER — NALOXONE HYDROCHLORIDE 0.4 MG/ML
.1-.4 INJECTION, SOLUTION INTRAMUSCULAR; INTRAVENOUS; SUBCUTANEOUS
Status: DISCONTINUED | OUTPATIENT
Start: 2018-08-14 | End: 2018-08-14 | Stop reason: HOSPADM

## 2018-08-14 RX ORDER — FENTANYL CITRATE 50 UG/ML
25-50 INJECTION, SOLUTION INTRAMUSCULAR; INTRAVENOUS EVERY 5 MIN PRN
Status: DISCONTINUED | OUTPATIENT
Start: 2018-08-14 | End: 2018-08-14 | Stop reason: HOSPADM

## 2018-08-14 RX ADMIN — FENTANYL CITRATE 50 MCG: 50 INJECTION, SOLUTION INTRAMUSCULAR; INTRAVENOUS at 09:08

## 2018-08-14 RX ADMIN — LIDOCAINE HYDROCHLORIDE 5 ML: 10 INJECTION, SOLUTION EPIDURAL; INFILTRATION; INTRACAUDAL; PERINEURAL at 09:40

## 2018-08-14 RX ADMIN — MIDAZOLAM HYDROCHLORIDE 1 MG: 1 INJECTION, SOLUTION INTRAMUSCULAR; INTRAVENOUS at 09:09

## 2018-08-14 NOTE — PROGRESS NOTES
CT guided Lung Biopsy. Consent obtained, time out taken. Pt received 1 mg IV versed and 50 mcg IV fentanyl for sedation. Placed on O2 2l nc for sats dipping to 89%. Improved quickly to 95 %. VSS. Tolerated procedure well. Denies pain or SOB. Drssg CDI to right lateral chest, no crepitus. Transported back to care suites, Taking sips of water, denies nausea. Sister at bedside.

## 2018-08-14 NOTE — PROGRESS NOTES
Here with sister for lung biopsy.  Explained pre procedure and answered questions.  Has a right chest tube which pt states may come out after her sees his MD tomorrow.  Discharge instructions given with verbalized understanding.  Resting on cart with call light in reach.

## 2018-08-14 NOTE — DISCHARGE INSTRUCTIONS
Lung Biopsy Discharge Instructions     After you go home:      You may resume your normal diet    Have an adult stay with you for 6 hours if you received sedation       For 24 hours - due to the sedation you received:    Relax and take it easy    Do NOT make any important or legal decisions    Do NOT drive or operate machines at home or at work    Do NOT drink alcohol    Care of Puncture Site:      For the first 48 hrs, check your puncture site every couple hours while you are awake     You may remove/change the bandaid tomorrow    You may shower tomorrow    No tub baths, whirlpools or swimming until your puncture site has fully healed    Activity       You may go back to normal activity in 24 hours     Wait 48 hours before lifting, straining, exercise or other strenuous activity    Medicines:      You may resume all medications    Resume your Warfarin/Coumadin at your regular dose today. Follow up with your provider to have your INR rechecked    Resume your Platelet Inhibitors and Aspirin tomorrow at your regular dose    For minor pain, you may take Acetaminophen (Tylenol) or Ibuprofen (Advil)            Call the provider who ordered this test if:      Increased pain or a large or growing hard lump around the site    Blood or fluid is draining from the site    The site is red, swollen, hot or tender    Chills or a fever greater than 101 F (38 C)    Pain that is getting worse    Shortness of breath    Any questions or concerns    Call  911 or go to the Emergency Room if:      Severe chest pain or trouble breathing    Increased blood in your sputum (phlegm)    Bleeding that you cannot control        If you have questions call:          [facility] @ [phone]      The provider who performed your procedure was Dr Becerra.

## 2018-08-14 NOTE — IP AVS SNAPSHOT
[facility] [address]    [place] [address]    Phone:  [phone]                                       After Visit Summary   8/14/2018    Jorge L Townsend    MRN: [id number]           After Visit Summary Signature Page     I have received my discharge instructions, and my questions have been answered. I have discussed any challenges I see with this plan with the nurse or doctor.    ..........................................................................................................................................  Patient/Patient Representative Signature      ..........................................................................................................................................  Patient Representative Print Name and Relationship to Patient    ..................................................               ................................................  Date                                            Time    ..........................................................................................................................................  Reviewed by Signature/Title    ...................................................              ..............................................  Date                                                            Time

## 2018-08-14 NOTE — IP AVS SNAPSHOT
MRN:[id number]                      After Visit Summary   8/14/2018    Jorge L Townsend    MRN: [id number]           Visit Information        Department      8/14/2018  7:29 AM [facility]          Review of your medicines      UNREVIEWED medicines. Ask your doctor about these medicines        Dose / Directions    levETIRAcetam 1000 MG Tabs        Dose:  1 tablet   Take 1 tablet by mouth daily   Refills:  0       metroNIDAZOLE 0.75 % topical gel   Commonly known as:  METROGEL        Apply topically 2 times daily Apply to nose   Refills:  0                Protect others around you: Learn how to safely use, store and throw away your medicines at www.disposemymeds.org.         Follow-ups after your visit        Your next 10 appointments already scheduled     Aug 14, 2018  9:00 AM CDT   CT LUNG MEDIASTINUM BIOPSY with SHCT1, SH BODY RAD   [facility] ([facility])    [address] [place] [address] [phone]           Please bring any scans or X-rays taken at other hospitals, if they may be helpful. Also bring a list of your medicines, including vitamins, minerals and over-the-counter drugs.  Tell your doctor in advance:   If you have any allergies.   If you are breastfeeding or there s any chance you are pregnant.   If you are taking Coumadin (or any other blood thinners) 5 days prior to the exam for any special instructions.   If you are diabetic to determine if your insulin needs have to be adjusted for the exam.  The day before your exam:   Drink extra fluids  at least six 8-ounce glasses (unless your doctor tells you to restrict fluids).  The day of your exam:   No eating or drinking for 4 hours before your test. You may take medicine with small sips of water.   Plan for an adult to drive you home and stay with you until morning.   Leave your valuables at home.  If you have any questions, please call the imaging department where you will  have your exam.               Care Instructions        Further instructions from your care team       Lung Biopsy Discharge Instructions     After you go home:      You may resume your normal diet    Have an adult stay with you for 6 hours if you received sedation       For 24 hours - due to the sedation you received:    Relax and take it easy    Do NOT make any important or legal decisions    Do NOT drive or operate machines at home or at work    Do NOT drink alcohol    Care of Puncture Site:      For the first 48 hrs, check your puncture site every couple hours while you are awake     You may remove/change the bandaid tomorrow    You may shower tomorrow    No tub baths, whirlpools or swimming until your puncture site has fully healed    Activity       You may go back to normal activity in 24 hours     Wait 48 hours before lifting, straining, exercise or other strenuous activity    Medicines:      You may resume all medications    Resume your Warfarin/Coumadin at your regular dose today. Follow up with your provider to have your INR rechecked    Resume your Platelet Inhibitors and Aspirin tomorrow at your regular dose    For minor pain, you may take Acetaminophen (Tylenol) or Ibuprofen (Advil)            Call the provider who ordered this test if:      Increased pain or a large or growing hard lump around the site    Blood or fluid is draining from the site    The site is red, swollen, hot or tender    Chills or a fever greater than 101 F (38 C)    Pain that is getting worse    Shortness of breath    Any questions or concerns    Call  911 or go to the Emergency Room if:      Severe chest pain or trouble breathing    Increased blood in your sputum (phlegm)    Bleeding that you cannot control        If you have questions call:          [facility] Radiology Dept @ [phone]      The provider who performed your procedure was Dr Becerra.     Additional Information About Your Visit        MyChart Information      [employer] gives you secure access to your electronic health record. If you see a primary care provider, you can also send messages to your care team and make appointments. If you have questions, please call your primary care clinic.  If you do not have a primary care provider, please call [phone] and they will assist you.        Care EveryWhere ID     This is your Care EveryWhere ID. This could be used by other organizations to access your [facility] medical records  [id number]        Your Vitals Were     Blood Pressure Temperature Respirations Pulse Oximetry          136/79 (BP Location: Left arm) 96  F (35.6  C) (Oral) 18 97%         Primary Care Provider Office Phone # Fax #    Oskar SIENA Arthur [phone]      Equal Access to Services     [facility] : Hadii aad ku hadasho Soomaali, waaxda luqadaha, qaybta kaalmada adeegyada, gia lazo . So [facility] [phone].    ATENCIÓN: Si habla español, tiene a pleitez disposición servicios gratuitos de asistencia lingüística. [facility] [phone].    We comply with applicable federal civil rights laws and [place] laws. We do not discriminate on the basis of race, color, national origin, age, disability, sex, sexual orientation, or gender identity.            Thank you!     Thank you for choosing [facility] for your care. Our goal is always to provide you with excellent care. Hearing back from our patients is one way we can continue to improve our services. Please take a few minutes to complete the written survey that you may receive in the mail after you visit with us. Thank you!             Medication List: This is a list of all your medications and when to take them. Check marks below indicate your daily home schedule. Keep this list as a reference.      Medications           Morning Afternoon Evening Bedtime As Needed    levETIRAcetam 1000 MG Tabs   Take 1 tablet by mouth daily                                metroNIDAZOLE 0.75 %  topical gel   Commonly known as:  METROGEL   Apply topically 2 times daily Apply to nose

## 2018-08-14 NOTE — PROGRESS NOTES
"CXR shows \"looks good\" per Dr Becerra and he stated pt can go home.  Dressing remains D/I.  Getting dressed.  [facility] Discharge Summary    Discharge Criteria:   Discharge Criteria met per MD orders: Yes.   Vital signs stable.     Pt demonstrates ability to ambulate safely: Yes.  (See discharge questionnaire for additional information)    Discharge instructions & education:   Discharge instructions reviewed with patient and sister. Patient verbalizes  understanding.    Medications:   Patient will be discharging on new medications- No. Patient verbalizes reason for use, start date, and side effects NA.    Items returned to patient:   Home and hospital acquired medications returned to patient NA   Listed belongings gathered and returned to patient: Yes    Patient discharged to home via w/c with sister.    Beth Juarez      "

## 2018-08-14 NOTE — PROCEDURES
RADIOLOGY POST PROCEDURE NOTE w/ SEDATION  Patient name: Jorge L Townsend  MRN: [id number]  : 1965    Pre-procedure diagnosis: R lung nodule  Post-procedure diagnosis: Same    Procedure Date/Time: 2018  9:34 AM  Procedure: CT guided biopsy  Estimated blood loss: None  Specimen(s) collected with description: 20ga cores    I determined this patient to be an appropriate candidate for the planned sedation and procedure and reassessed the patient IMMEDIATELY PRIOR to sedation and procedure.     The patient tolerated the procedure well with no immediate complications.  Significant findings:Right lung nodule    See imaging dictation for procedural details.    Provider name: Mikel Becerra  Assistant(s):None

## 2018-08-15 LAB — COPATH REPORT: NORMAL

## 2018-08-22 ENCOUNTER — APPOINTMENT (OUTPATIENT)
Dept: GENERAL RADIOLOGY | Facility: CLINIC | Age: 53
End: 2018-08-22
Attending: EMERGENCY MEDICINE
Payer: COMMERCIAL

## 2018-08-22 ENCOUNTER — HOSPITAL ENCOUNTER (EMERGENCY)
Facility: CLINIC | Age: 53
Discharge: HOME | End: 2018-08-22
Attending: EMERGENCY MEDICINE | Admitting: EMERGENCY MEDICINE
Payer: COMMERCIAL

## 2018-08-22 VITALS
OXYGEN SATURATION: 93 % | SYSTOLIC BLOOD PRESSURE: 124 MMHG | RESPIRATION RATE: 16 BRPM | HEART RATE: 117 BPM | DIASTOLIC BLOOD PRESSURE: 85 MMHG | HEIGHT: 77 IN | BODY MASS INDEX: 20.66 KG/M2 | WEIGHT: 175 LBS | TEMPERATURE: 98.2 F

## 2018-08-22 DIAGNOSIS — J93.83 OTHER PNEUMOTHORAX: ICD-10-CM

## 2018-08-22 PROCEDURE — 99283 EMERGENCY DEPT VISIT LOW MDM: CPT

## 2018-08-22 PROCEDURE — 25000132 ZZH RX MED GY IP 250 OP 250 PS 637: Performed by: EMERGENCY MEDICINE

## 2018-08-22 PROCEDURE — 71046 X-RAY EXAM CHEST 2 VIEWS: CPT

## 2018-08-22 RX ORDER — OXYCODONE AND ACETAMINOPHEN 5; 325 MG/1; MG/1
2 TABLET ORAL ONCE
Status: DISCONTINUED | OUTPATIENT
Start: 2018-08-22 | End: 2018-08-22

## 2018-08-22 RX ORDER — OXYCODONE AND ACETAMINOPHEN 5; 325 MG/1; MG/1
1-2 TABLET ORAL EVERY 4 HOURS PRN
Qty: 12 TABLET | Refills: 0 | Status: ON HOLD | OUTPATIENT
Start: 2018-08-22 | End: 2018-09-05

## 2018-08-22 RX ORDER — OXYCODONE AND ACETAMINOPHEN 5; 325 MG/1; MG/1
2 TABLET ORAL ONCE
Status: COMPLETED | OUTPATIENT
Start: 2018-08-22 | End: 2018-08-22

## 2018-08-22 RX ADMIN — OXYCODONE HYDROCHLORIDE AND ACETAMINOPHEN 2 TABLET: 5; 325 TABLET ORAL at 20:07

## 2018-08-22 ASSESSMENT — ENCOUNTER SYMPTOMS: SHORTNESS OF BREATH: 1

## 2018-08-22 NOTE — ED AVS SNAPSHOT
Emergency Department    [address] [facility] [address]    Phone:  [phone]    Fax:  [phone]                                       Jorge L Townsend   MRN: [id number]    Department:   Emergency Department   Date of Visit:  8/22/2018           Patient Information     Date Of Birth          1965        Your diagnoses for this visit were:     Other pneumothorax Complex case with pigtail catheter in place.       You were seen by Boyd Bedolla MD.      Follow-up Information     Follow up with Lamont Osorio MD. Call in 1 day.    Specialty:  Thoracic Diseases    Contact information:    [place] [facility]   [address] [facility] [address]  [facility] [id number] [phone]          Discharge Instructions       Call Dr. Newberry tomorrow.  Percocet for pain    24 Hour Appointment Hotline       To make an appointment at any [facility], call [phone] ([phone]). If you don't have a family doctor or clinic, we will help you find one. [facility] clinics are conveniently located to serve the needs of you and your family.             Review of your medicines      START taking        Dose / Directions Last dose taken    oxyCODONE-acetaminophen 5-325 MG per tablet   Commonly known as:  PERCOCET   Dose:  1-2 tablet   Quantity:  12 tablet        Take 1-2 tablets by mouth every 4 hours as needed for pain   Refills:  0          Our records show that you are taking the medicines listed below. If these are incorrect, please call your family doctor or clinic.        Dose / Directions Last dose taken    levETIRAcetam 1000 MG Tabs   Dose:  1 tablet        Take 1 tablet by mouth daily   Refills:  0        metroNIDAZOLE 0.75 % topical gel   Commonly known as:  METROGEL        Apply topically 2 times daily Apply to nose   Refills:  0                Information about OPIOIDS     PRESCRIPTION OPIOIDS: WHAT YOU NEED TO KNOW   We gave you an opioid (narcotic) pain medicine. It is important  to manage your pain, but opioids are not always the best choice. You should first try all the other options your care team gave you. Take this medicine for as short a time (and as few doses) as possible.    Some activities can increase your pain, such as bandage changes or therapy sessions. It may help to take your pain medicine 30 to 60 minutes before these activities. Reduce your stress by getting enough sleep, working on hobbies you enjoy and practicing relaxation or meditation. Talk to your care team about ways to manage your pain beyond prescription opioids.    These medicines have risks:    DO NOT drive when on new or higher doses of pain medicine. These medicines can affect your alertness and reaction times, and you could be arrested for driving under the influence (DUI). If you need to use opioids long-term, talk to your care team about driving.    DO NOT operate heavy machinery    DO NOT do any other dangerous activities while taking these medicines.    DO NOT drink any alcohol while taking these medicines.     If the opioid prescribed includes acetaminophen, DO NOT take with any other medicines that contain acetaminophen. Read all labels carefully. Look for the word  acetaminophen  or  Tylenol.  Ask your pharmacist if you have questions or are unsure.    You can get addicted to pain medicines, especially if you have a history of addiction (chemical, alcohol or substance dependence). Talk to your care team about ways to reduce this risk.    All opioids tend to cause constipation. Drink plenty of water and eat foods that have a lot of fiber, such as fruits, vegetables, prune juice, apple juice and high-fiber cereal. Take a laxative (Miralax, milk of magnesia, Colace, Senna) if you don t move your bowels at least every other day. Other side effects include upset stomach, sleepiness, dizziness, throwing up, tolerance (needing more of the medicine to have the same effect), physical dependence and slowed  breathing.    Store your pills in a secure place, locked if possible. We will not replace any lost or stolen medicine. If you don t finish your medicine, please throw away (dispose) as directed by your pharmacist. The [place] [employer] has more information about safe disposal: https://www.pca.state.[place]/living-green/managing-unwanted-medications        Prescriptions were sent or printed at these locations (1 Prescription)                   Other Prescriptions                Printed at Department/Unit printer (1 of 1)         oxyCODONE-acetaminophen (PERCOCET) 5-325 MG per tablet                Procedures and tests performed during your visit     Chest XR,  PA & LAT      Orders Needing Specimen Collection     None      Pending Results     Date and Time Order Name Status Description    8/22/2018 1949 Chest XR,  PA & LAT Preliminary             Pending Culture Results     No orders found from 8/20/2018 to 8/23/2018.            Pending Results Instructions     If you had any lab results that were not finalized at the time of your Discharge, you can call the ED Lab Result RN at [phone]. You will be contacted by this team for any positive Lab results or changes in treatment. The nurses are available 7 days a week from 10A to 6:30P.  You can leave a message 24 hours per day and they will return your call.        Test Results From Your Hospital Stay        8/22/2018  8:25 PM      Narrative     CHEST TWO VIEWS  8/22/2018 8:05 PM     COMPARISON: Frontal chest x-ray 8/14/2018    HISTORY: Chest tube.         Impression     IMPRESSION: Pigtail chest tube at the right lung base has been placed  since the comparison study. There is a persistent tiny right apical  pneumothorax and a small right base pneumothorax. There is no  pneumothorax on the left. There is no pleural effusion on either side.  Emphysematous and fibrotic changes throughout both lungs again noted.  There is no evidence for pneumonia. Heart  size remains normal.                Clinical Quality Measure: Blood Pressure Screening     Your blood pressure was checked while you were in the emergency department today. The last reading we obtained was  BP: 124/85 . Please read the guidelines below about what these numbers mean and what you should do about them.  If your systolic blood pressure (the top number) is less than 120 and your diastolic blood pressure (the bottom number) is less than 80, then your blood pressure is normal. There is nothing more that you need to do about it.  If your systolic blood pressure (the top number) is 120-139 or your diastolic blood pressure (the bottom number) is 80-89, your blood pressure may be higher than it should be. You should have your blood pressure rechecked within a year by a primary care provider.  If your systolic blood pressure (the top number) is 140 or greater or your diastolic blood pressure (the bottom number) is 90 or greater, you may have high blood pressure. High blood pressure is treatable, but if left untreated over time it can put you at risk for heart attack, stroke, or kidney failure. You should have your blood pressure rechecked by a primary care provider within the next 4 weeks.  If your provider in the emergency department today gave you specific instructions to follow-up with your doctor or provider even sooner than that, you should follow that instruction and not wait for up to 4 weeks for your follow-up visit.        Thank you for choosing [facility]       Thank you for choosing [facility] for your care. Our goal is always to provide you with excellent care. Hearing back from our patients is one way we can continue to improve our services. Please take a few minutes to complete the written survey that you may receive in the mail after you visit with us. Thank you!        [employer] Information     [employer] gives you secure access to your electronic health record. If you see a primary care provider, you  can also send messages to your care team and make appointments. If you have questions, please call your primary care clinic.  If you do not have a primary care provider, please call [phone] and they will assist you.        Care EveryWhere ID     This is your Care EveryWhere ID. This could be used by other organizations to access your [facility] medical records  [id number]        Equal Access to Services     DEBORA HUYNH : Hadii milli Lin, waaxda lupaulo, qaybta kaalmada nano, gia lazo . So [facility] [phone].    ATENCIÓN: Si habla español, tiene a pleitez disposición servicios gratuitos de asistencia lingüística. Princess al [phone].    We comply with applicable federal civil rights laws and [place] laws. We do not discriminate on the basis of race, color, national origin, age, disability, sex, sexual orientation, or gender identity.            After Visit Summary       This is your record. Keep this with you and show to your community pharmacist(s) and doctor(s) at your next visit.

## 2018-08-22 NOTE — ED AVS SNAPSHOT
Emergency Department    [address] [place] [address]    Phone:  [phone]    Fax:  [phone]                                       Jorge L Townsend   MRN: [id number]    Department:   Emergency Department   Date of Visit:  8/22/2018           After Visit Summary Signature Page     I have received my discharge instructions, and my questions have been answered. I have discussed any challenges I see with this plan with the nurse or doctor.    ..........................................................................................................................................  Patient/Patient Representative Signature      ..........................................................................................................................................  Patient Representative Print Name and Relationship to Patient    ..................................................               ................................................  Date                                            Time    ..........................................................................................................................................  Reviewed by Signature/Title    ...................................................              ..............................................  Date                                                            Time

## 2018-08-23 NOTE — ED PROVIDER NOTES
"  History     Chief Complaint:  Difficulty breathing      HPI   Jorge L Townsend is a 52 year old male with a history of pneumotharax who presents to the emergency department with his son for evaluation of difficulty breathing. Upon evaluation, the patient states that he had a chest tube put in 10 days ago and currently complains that he cannot breath. This started 1 hour ago. He was seen by Dr. Osorio who inserted the chest tube and is scheduled for reevaluation on Monday. Chest CT was taken at [facility] yesterday and a PET scan 5 days ago. The patient states that he has pain around around his chest tube but it has gotten better.     Chest CT without contrast  1. Small to moderate pneumothorax at the right lung base has increased in size since 8/9/2018  2. Small right apical pneumothorax is unchanged  3. Small right pleural effusion  4. Irregular area of consolidation and mild patchy groundglass opacity in the right upper lobe laterally are unchanged.  5. Mildly enlarged precarinal lymph node in the mediastinum is unchanged  6. Emphysema.  7. Diffuse fatty infiltrate of the liver.           Allergies:  No known drug allergies     Medications:    Levetracetam  Metrogel     Past Medical History:    Alcohol dependence  Back pain  Essential tremor  Pneumothorax    Past Surgical History:    Thoracic surgery    Family History:    Cancer  Cerebrovascular disease     Social History:  Smoking status: Former Smoker  Alcohol use: Yes    Marital Status:  Single [1]       Review of Systems   Respiratory: Positive for shortness of breath.          Physical Exam     Patient Vitals for the past 24 hrs:   BP Temp Temp src Pulse Heart Rate SpO2 Height Weight   08/22/18 2030 122/88 - - - - 91 % - -   08/22/18 2015 126/89 - - - - 91 % - -   08/22/18 1951 (!) 134/104 - - - - - - -   08/22/18 1949 - 98.2  F (36.8  C) Oral 117 117 94 % 1.956 m (6' 5\") 79.4 kg (175 lb)         Physical Exam  Nursing note and vitals " reviewed.  Constitutional:   Awake alert  HENT:   Pharynx normal, tms normal, atraumatic  Mouth/Throat:  Oropharynx is clear and moist.   Eyes:    Conjunctivae normal and EOM are normal. Pupils are equal, round, and reactive to light.   Neck:    Normal range of motion. Neck supple. No tracheal deviation present.   Cardiovascular: Normal rate, regular rhythm, normal heart sounds and intact distal pulses.    Pulmonary/Chest:  Effort normal and breath sounds normal. No respiratory distress. No wheezes. No rales. No tenderness.   Abdominal:   Soft. The patient exhibits no mass. There is no tenderness. There is no rebound and no guarding.   Musculoskeletal:  Normal range of motion. No edema and no tenderness.   Lymphadenopathy:  No cervical adenopathy.   Neurological:  Alert and oriented to person, place, and time. No cranial nerve deficit. Normal muscle tone.             Skin:    Skin is warm and dry.   Psychiatric:   Normal mood and affect.       Emergency Department Course       Imaging:  Radiographic findings were communicated with the patient who voiced understanding of the findings.    Chest XR, PA and LAT  IMPRESSION: Pigtail chest tube at the right lung base has been placed  since the comparison study. There is a persistent tiny right apical  pneumothorax and a small right base pneumothorax. There is no  pneumothorax on the left. There is no pleural effusion on either side.  Emphysematous and fibrotic changes throughout both lungs again noted.  There is no evidence for pneumonia. Heart size remains normal.  As read by Radiology       Interventions:  2007 Percocet 5-325MG x2 PO    Emergency Department Course:  Past medical records, nursing notes, and vitals reviewed.  1944: I performed an exam of the patient and obtained history, as documented above.    2041: Spoke to Dr. Barrientos, thoracic surgeon, who is on call for Dr. Osorio.     2045: I rechecked the patient.  Findings and plan explained to the Patient.  Patient discharged home with instructions regarding supportive care, medications, and reasons to return. The importance of close follow-up was reviewed.        Impression & Plan      Medical Decision Makin-year-old male with history of bilateral spontaneous pneumothoraces status post pleurodesis about 20 years ago.  He continues to smoke.  He had pneumothorax and shortness of breath 2018.  He was admitted here.  He has complex multiple small pneumothoraces on the right side.  Interventional radiology CT-guided pigtail catheter was placed here on .  He just saw Dr. Newberry today in the clinic.  Pigtail catheter remains.  CT scan as an outpatient findings as above.  Patient had good airflow bilaterally.  Chest x-ray findings as above.  Patient feeling much better after some Percocet.  He was having pain at insertion site.  Unclear if the catheter removed or wiggled.  Catheter seems to be in good place so on chest x-ray.  Will discharge to home and follow-up with Dr. Newberry tomorrow.  Percocet for pain.  Patient was very anxious.  There may be an anxiety component to his visit tonight also.    Diagnosis:    ICD-10-CM    1. Other pneumothorax J93.83     Complex case with pigtail catheter in place.       Disposition:  discharged to home    Discharge Medications:  New Prescriptions    No medications on file         Antonio Valdes  2018    EMERGENCY DEPARTMENT    Scribe Disclosure:  I, Antonio Valdes, am serving as a scribe at 7:44 PM on 2018 to document services personally performed by Boyd Bedolla MD based on my observations and the provider's statements to me.        Boyd Bedolla MD  [phone]

## 2018-08-23 NOTE — ED TRIAGE NOTES
"Pt arrived with portable chest tube that was placed 10 days before. He was seen in the office earlier today. Pt states he had a sudden onset of pain. He is very anxious and has a history of anxiety.  His partner at the bedside is giving his history and said \"he just gets like this sometimes\".  He is moving so much it's difficult to get VS.  1st O2 reading 94% on RA.   MD at bedside when pt arrived.   "

## 2018-08-31 DIAGNOSIS — G40.909 NONINTRACTABLE EPILEPSY WITHOUT STATUS EPILEPTICUS, UNSPECIFIED EPILEPSY TYPE (H): ICD-10-CM

## 2018-08-31 RX ORDER — LEVETIRACETAM 1000 MG/1
TABLET ORAL
Qty: 180 TABLET | Refills: 0 | OUTPATIENT
Start: 2018-08-31

## 2018-08-31 NOTE — TELEPHONE ENCOUNTER
"Requested Prescriptions   Pending Prescriptions Disp Refills     levETIRAcetam 1000 MG TABS [Pharmacy Med Name: LEVETIRACETAM 1,000 MG TABLET]  Last Written Prescription Date:  05/30/18  Last Fill Quantity: 180,  # refills: 0   Last office visit: 4/10/2018 with prescribing provider:    Future Office Visit:     180 tablet 0     Sig: TAKE 1 TABLET BY MOUTH EVERY 12 HOURS    Anti-Seizure Meds Protocol  Failed    8/31/2018  1:58 AM       Failed - Review Authorizing provider's last note.     Refer to last progress notes: confirm request is for original authorizing provider (cannot be through other providers).         Failed - Normal platelet count on file in past 26 months    Recent Labs   Lab Test  08/06/18   1100   PLT  133*              Passed - Recent (12 mo) or future (30 days) visit within the authorizing provider's specialty    Patient had office visit in the last 12 months or has a visit in the next 30 days with authorizing provider or within the authorizing provider's specialty.  See \"Patient Info\" tab in inbasket, or \"Choose Columns\" in Meds & Orders section of the refill encounter.           Passed - Normal CBC on file in past 26 months    Recent Labs   Lab Test  08/06/18   1100   WBC  6.4   RBC  4.13*   HGB  13.4   HCT  40.4   PLT  133*       For [facility] ONLY: [id number] = WBC, THOO170 = RBC         Passed - Normal ALT or AST on file in past 26 months    Recent Labs   Lab Test  08/06/18   1100   ALT  32     Recent Labs   Lab Test  08/06/18   1100   AST  41               "

## 2018-09-05 ENCOUNTER — HOSPITAL ENCOUNTER (INPATIENT)
Facility: CLINIC | Age: 53
LOS: 4 days | Discharge: HOME | End: 2018-09-09
Attending: INTERNAL MEDICINE | Admitting: INTERNAL MEDICINE
Payer: COMMERCIAL

## 2018-09-05 DIAGNOSIS — G40.909 SEIZURE DISORDER (H): ICD-10-CM

## 2018-09-05 DIAGNOSIS — R56.9 SEIZURE (H): Primary | ICD-10-CM

## 2018-09-05 DIAGNOSIS — J86.9 EMPYEMA LUNG (H): ICD-10-CM

## 2018-09-05 LAB
ANION GAP SERPL CALCULATED.3IONS-SCNC: 10 MMOL/L (ref 3–14)
BUN SERPL-MCNC: 13 MG/DL (ref 7–30)
CALCIUM SERPL-MCNC: 9 MG/DL (ref 8.5–10.1)
CHLORIDE SERPL-SCNC: 96 MMOL/L (ref 94–109)
CO2 SERPL-SCNC: 29 MMOL/L (ref 20–32)
CREAT SERPL-MCNC: 0.69 MG/DL (ref 0.66–1.25)
ERYTHROCYTE [DISTWIDTH] IN BLOOD BY AUTOMATED COUNT: 13.9 % (ref 10–15)
GFR SERPL CREATININE-BSD FRML MDRD: >90 ML/MIN/1.7M2
GLUCOSE SERPL-MCNC: 103 MG/DL (ref 70–99)
HCT VFR BLD AUTO: 38.1 % (ref 40–53)
HGB BLD-MCNC: 13 G/DL (ref 13.3–17.7)
MCH RBC QN AUTO: 32.9 PG (ref 26.5–33)
MCHC RBC AUTO-ENTMCNC: 34.1 G/DL (ref 31.5–36.5)
MCV RBC AUTO: 97 FL (ref 78–100)
PLATELET # BLD AUTO: 188 10E9/L (ref 150–450)
POTASSIUM SERPL-SCNC: 4 MMOL/L (ref 3.4–5.3)
RBC # BLD AUTO: 3.95 10E12/L (ref 4.4–5.9)
SODIUM SERPL-SCNC: 135 MMOL/L (ref 133–144)
WBC # BLD AUTO: 7.8 10E9/L (ref 4–11)

## 2018-09-05 PROCEDURE — 25000128 H RX IP 250 OP 636: Performed by: PHYSICIAN ASSISTANT

## 2018-09-05 PROCEDURE — 99223 1ST HOSP IP/OBS HIGH 75: CPT | Mod: AI | Performed by: PHYSICIAN ASSISTANT

## 2018-09-05 PROCEDURE — 80048 BASIC METABOLIC PNL TOTAL CA: CPT | Performed by: PHYSICIAN ASSISTANT

## 2018-09-05 PROCEDURE — 36415 COLL VENOUS BLD VENIPUNCTURE: CPT | Performed by: PHYSICIAN ASSISTANT

## 2018-09-05 PROCEDURE — 85027 COMPLETE CBC AUTOMATED: CPT | Performed by: PHYSICIAN ASSISTANT

## 2018-09-05 PROCEDURE — 12000007 ZZH R&B INTERMEDIATE

## 2018-09-05 PROCEDURE — 87040 BLOOD CULTURE FOR BACTERIA: CPT | Performed by: PHYSICIAN ASSISTANT

## 2018-09-05 PROCEDURE — 99207 ZZC APP CREDIT; MD BILLING SHARED VISIT: CPT | Performed by: INTERNAL MEDICINE

## 2018-09-05 RX ORDER — SODIUM CHLORIDE 9 MG/ML
INJECTION, SOLUTION INTRAVENOUS CONTINUOUS
Status: DISCONTINUED | OUTPATIENT
Start: 2018-09-05 | End: 2018-09-07

## 2018-09-05 RX ORDER — ONDANSETRON 2 MG/ML
4 INJECTION INTRAMUSCULAR; INTRAVENOUS EVERY 6 HOURS PRN
Status: DISCONTINUED | OUTPATIENT
Start: 2018-09-05 | End: 2018-09-09 | Stop reason: HOSPADM

## 2018-09-05 RX ORDER — CALCIUM CARBONATE 500 MG/1
1000 TABLET, CHEWABLE ORAL 4 TIMES DAILY PRN
Status: DISCONTINUED | OUTPATIENT
Start: 2018-09-05 | End: 2018-09-09 | Stop reason: HOSPADM

## 2018-09-05 RX ORDER — METRONIDAZOLE 7.5 MG/G
GEL TOPICAL EVERY MORNING
Status: DISCONTINUED | OUTPATIENT
Start: 2018-09-06 | End: 2018-09-09 | Stop reason: HOSPADM

## 2018-09-05 RX ORDER — ACETAMINOPHEN 650 MG/1
650 SUPPOSITORY RECTAL EVERY 4 HOURS PRN
Status: DISCONTINUED | OUTPATIENT
Start: 2018-09-05 | End: 2018-09-09 | Stop reason: HOSPADM

## 2018-09-05 RX ORDER — PROCHLORPERAZINE 25 MG
25 SUPPOSITORY, RECTAL RECTAL EVERY 12 HOURS PRN
Status: DISCONTINUED | OUTPATIENT
Start: 2018-09-05 | End: 2018-09-09 | Stop reason: HOSPADM

## 2018-09-05 RX ORDER — NALOXONE HYDROCHLORIDE 0.4 MG/ML
.1-.4 INJECTION, SOLUTION INTRAMUSCULAR; INTRAVENOUS; SUBCUTANEOUS
Status: DISCONTINUED | OUTPATIENT
Start: 2018-09-05 | End: 2018-09-09 | Stop reason: HOSPADM

## 2018-09-05 RX ORDER — POTASSIUM CHLORIDE 1500 MG/1
20-40 TABLET, EXTENDED RELEASE ORAL
Status: DISCONTINUED | OUTPATIENT
Start: 2018-09-05 | End: 2018-09-09 | Stop reason: HOSPADM

## 2018-09-05 RX ORDER — LIDOCAINE 40 MG/G
CREAM TOPICAL
Status: DISCONTINUED | OUTPATIENT
Start: 2018-09-05 | End: 2018-09-09 | Stop reason: HOSPADM

## 2018-09-05 RX ORDER — LEVETIRACETAM 500 MG/1
1000 TABLET ORAL DAILY
Status: DISCONTINUED | OUTPATIENT
Start: 2018-09-06 | End: 2018-09-09 | Stop reason: HOSPADM

## 2018-09-05 RX ORDER — POTASSIUM CHLORIDE 7.45 MG/ML
10 INJECTION INTRAVENOUS
Status: DISCONTINUED | OUTPATIENT
Start: 2018-09-05 | End: 2018-09-09 | Stop reason: HOSPADM

## 2018-09-05 RX ORDER — POTASSIUM CHLORIDE 1.5 G/1.58G
20-40 POWDER, FOR SOLUTION ORAL
Status: DISCONTINUED | OUTPATIENT
Start: 2018-09-05 | End: 2018-09-09 | Stop reason: HOSPADM

## 2018-09-05 RX ORDER — POTASSIUM CL/LIDO/0.9 % NACL 10MEQ/0.1L
10 INTRAVENOUS SOLUTION, PIGGYBACK (ML) INTRAVENOUS
Status: DISCONTINUED | OUTPATIENT
Start: 2018-09-05 | End: 2018-09-09 | Stop reason: HOSPADM

## 2018-09-05 RX ORDER — ACETAMINOPHEN 325 MG/1
650 TABLET ORAL EVERY 4 HOURS PRN
Status: DISCONTINUED | OUTPATIENT
Start: 2018-09-05 | End: 2018-09-09 | Stop reason: HOSPADM

## 2018-09-05 RX ORDER — PROCHLORPERAZINE MALEATE 10 MG
10 TABLET ORAL EVERY 6 HOURS PRN
Status: DISCONTINUED | OUTPATIENT
Start: 2018-09-05 | End: 2018-09-09 | Stop reason: HOSPADM

## 2018-09-05 RX ORDER — AMOXICILLIN 250 MG
2 CAPSULE ORAL 2 TIMES DAILY PRN
Status: DISCONTINUED | OUTPATIENT
Start: 2018-09-05 | End: 2018-09-09 | Stop reason: HOSPADM

## 2018-09-05 RX ORDER — POLYETHYLENE GLYCOL 3350 17 G/17G
17 POWDER, FOR SOLUTION ORAL DAILY PRN
Status: DISCONTINUED | OUTPATIENT
Start: 2018-09-05 | End: 2018-09-09 | Stop reason: HOSPADM

## 2018-09-05 RX ORDER — ONDANSETRON 4 MG/1
4 TABLET, ORALLY DISINTEGRATING ORAL EVERY 6 HOURS PRN
Status: DISCONTINUED | OUTPATIENT
Start: 2018-09-05 | End: 2018-09-09 | Stop reason: HOSPADM

## 2018-09-05 RX ORDER — POTASSIUM CHLORIDE 29.8 MG/ML
20 INJECTION INTRAVENOUS
Status: DISCONTINUED | OUTPATIENT
Start: 2018-09-05 | End: 2018-09-09 | Stop reason: HOSPADM

## 2018-09-05 RX ORDER — OXYCODONE HYDROCHLORIDE 5 MG/1
5-10 TABLET ORAL
Status: DISCONTINUED | OUTPATIENT
Start: 2018-09-05 | End: 2018-09-09 | Stop reason: HOSPADM

## 2018-09-05 RX ORDER — HYDROMORPHONE HYDROCHLORIDE 1 MG/ML
.3-.5 INJECTION, SOLUTION INTRAMUSCULAR; INTRAVENOUS; SUBCUTANEOUS
Status: DISCONTINUED | OUTPATIENT
Start: 2018-09-05 | End: 2018-09-09 | Stop reason: HOSPADM

## 2018-09-05 RX ORDER — AMOXICILLIN 250 MG
1 CAPSULE ORAL 2 TIMES DAILY PRN
Status: DISCONTINUED | OUTPATIENT
Start: 2018-09-05 | End: 2018-09-09 | Stop reason: HOSPADM

## 2018-09-05 RX ADMIN — SODIUM CHLORIDE: 9 INJECTION, SOLUTION INTRAVENOUS at 19:03

## 2018-09-05 ASSESSMENT — ACTIVITIES OF DAILY LIVING (ADL): ADLS_ACUITY_SCORE: 11

## 2018-09-05 NOTE — LETTER
Transition Communication Hand-off for Care Transitions to Next Level of Care Provider    Name: Jorge L Townsend  : 1965  MRN #: [id number]  Primary Care Provider: Oskar Arthur     Primary Clinic: [address] [place] [id number]     Reason for Hospitalization:  Empyema  Empyema lung (H)  Admit Date/Time: 2018  4:15 PM  Discharge Date: 18  Payor Source: Payor: BCBS / Plan: BCBS OUT OF STATE / Product Type: Indemnity /     Readmission Assessment Measure (CYNTHIA) Risk Score/category: ELEVATED    Plan of Care Goals/Milestone Events:    Reason for Communication Hand-off Referral: Non-adherence to plan of care related to:  Other ongoing alcohol use, declined surgical interventions in past, Elevated CYNTHIA, recent admission    Discharge Plan: home    Concern for non-adherence with plan of care:   Y/N Yes  Discharge Needs Assessment:    Follow-up specialty is recommended: Yes    Follow-up plan:  Future Appointments  Date Time Provider Department Center   2018 1:40 PM Oskar Arthur MD [facility]       Any outstanding tests or procedures:        Referrals     Future Labs/Procedures    Neurology Adult Referral             Key Recommendations:  Pt with history of spontaneous pneumothorax in 2018 with placement of CT which was removed 18.  He was admitted this time for increasing systemic symptoms since removal of CT.  A CT was inserted this admit and removed day of discharge.  He is to call ID on Monday 9/10/18 for results of pleural fluid cultures and is also to follow up with neurology about his Keppra.  Please help patient coordinate these follow ups if needed.    Garima Saeed RN Care Coordinator    AVS/Discharge Summary is the source of truth; this is a helpful guide for improved communication of patient story

## 2018-09-05 NOTE — IP AVS SNAPSHOT
MRN:[id number]                      After Visit Summary   9/5/2018    Jorge L Townsend    MRN: [id number]           Thank you!     Thank you for choosing [facility] for your care. Our goal is always to provide you with excellent care. Hearing back from our patients is one way we can continue to improve our services. Please take a few minutes to complete the written survey that you may receive in the mail after you visit with us. Thank you!        Patient Information     Date Of Birth          1965        Designated Caregiver       Most Recent Value    Caregiver    Will someone help with your care after discharge? no    Name of designated caregiver Jorge L    Phone number of caregiver [phone]      About your hospital stay     You were admitted on:  September 5, 2018 You last received care in the:  [place] [address] [facility]    You were discharged on:  September 9, 2018        Reason for your hospital stay       You were hospitalized for a pleural effusion which was drained with a chest tube.                  Who to Call     For medical emergencies, please call 911.  For non-urgent questions about your medical care, please call your primary care provider or clinic, [phone]          Attending Provider     Provider Specialty    Abdirashid Mckeon MD Internal Medicine       Primary Care Provider Office Phone # Fax #    Oskar Arthur [phone]      After Care Instructions     Activity       Your activity upon discharge: activity as tolerated            Diet       Follow this diet upon discharge: Orders Placed This Encounter      Regular Diet Adult            Discharge Instructions       Do not drink alcohol while you are taking antibiotics. After your antibiotic course, we recommend that you limit your alcohol use to one drink a day.                  Follow-up Appointments     Follow-up and recommended labs and tests        Follow up with primary care provider,  Oskar Arthur, within 7 days for hospital follow- up.  No follow up labs or test are needed.  - Follow up with neurology to discuss your Keppra.  - Please call Dr. Munson tomorrow at [phone] for follow up of your pleural fluid cultures                  Your next 10 appointments already scheduled     Sep 12, 2018  1:40 PM CDT   Office Visit with Oskar Arthur MD   [facility]    [address] [facility] [address] [phone]           Bring a current list of meds and any records pertaining to this visit. For Physicals, please bring immunization records and any forms needing to be filled out. Please arrive 10 minutes early to complete paperwork.              Additional Services     Neurology Adult Referral                 Further instructions from your care team       [facility]  Discharge Orders & Follow-up Care  Video-Assisted Thoracoscopy or Thoracotomy    You already have your follow-up appointments scheduled for you:  Please go to [facility] for a chest x-ray at _____________________________. [facility] is located in the same building ([facility], [address] [place] [address]) as Dr. Osorio' office. They are in [address].  Please then go for your post-operative follow-up appointment in Dr. Osorio' office, on the second floor of the [facility], [address] at ____________. You will see either Drea Vilchis PA-C or Dr. Osorio during your appointment.      Call Mamie at Dr. Osorio  office at [phone] to make a return appointment with a chest x-ray on Monday 09-17  Your appointment will be with either Drea Vilchis PA-C or Piedad Glynn PA-C, or Dr. Osorio.          Pending Results     Date and Time Order Name Status Description    9/6/2018 1131 Fluid Culture Aerobic Bacterial Preliminary     9/5/2018 1932 Blood culture Preliminary     9/5/2018 1932 Blood culture  "Preliminary             Statement of Approval     Ordered          09/09/18 1402  I have reviewed and agree with all the recommendations and orders detailed in this document.  EFFECTIVE NOW     Approved and electronically signed by:  Daniel Ruiz MD             Admission Information     Date & Time Provider Department Dept. Phone    9/5/2018 Abdirashid Mckeon MD [place] [address] [facility] [phone]      Your Vitals Were     Blood Pressure Pulse Temperature Respirations Height Weight    107/73 (BP Location: Right arm) 81 98.8  F (37.1  C) (Oral) 16 1.981 m (6' 6\") 77.3 kg (170 lb 6.4 oz)    Pulse Oximetry BMI (Body Mass Index)                94% 19.69 kg/m2          MyChart Information     [facility] gives you secure access to your electronic health record. If you see a primary care provider, you can also send messages to your care team and make appointments. If you have questions, please call your primary care clinic.  If you do not have a primary care provider, please call [phone] and they will assist you.        Care EveryWhere ID     This is your Care EveryWhere ID. This could be used by other organizations to access your [place] medical records  [id number]        Equal Access to Services     DEBORA HUYNH AH: Hadii milli hurst Soobed, waaxda luqadaha, qaybta kaalmada adejerryyada, gia corona. So [facility] [phone].    ATENCIÓN: Si habla español, tiene a pleitez disposición servicios gratuitos de asistencia lingüística. Llame al [phone].    We comply with applicable federal civil rights laws and [place] laws. We do not discriminate on the basis of race, color, national origin, age, disability, sex, sexual orientation, or gender identity.               Review of your medicines      START taking        Dose / Directions    amoxicillin-clavulanate 875-125 MG per tablet   Commonly known as:  AUGMENTIN        Dose:  1 tablet   Take 1 tablet by " mouth 2 times daily for 10 days   Quantity:  20 tablet   Refills:  0         CONTINUE these medicines which have NOT CHANGED        Dose / Directions    levETIRAcetam 1000 MG Tabs        Dose:  1000 mg   Take 1,000 mg by mouth every morning RX is for 1000 mg q12h but he takes 1000 mg daily.   Refills:  0       metroNIDAZOLE 0.75 % topical gel   Commonly known as:  METROGEL        Apply topically every morning Apply to nose   Refills:  0            Where to get your medicines      These medications were sent to [facility] [place], [place] - [address]  [address], [place] [address]     Phone:  [phone]     amoxicillin-clavulanate 875-125 MG per tablet                Protect others around you: Learn how to safely use, store and throw away your medicines at www.disposemymeds.org.        ANTIBIOTIC INSTRUCTION     You've Been Prescribed an Antibiotic - Now What?  Your healthcare team thinks that you or your loved one might have an infection. Some infections can be treated with antibiotics, which are powerful, life-saving drugs. Like all medications, antibiotics have side effects and should only be used when necessary. There are some important things you should know about your antibiotic treatment.      Your healthcare team may run tests before you start taking an antibiotic.    Your team may take samples (e.g., from your blood, urine or other areas) to run tests to look for bacteria. These test can be important to determine if you need an antibiotic at all and, if you do, which antibiotic will work best.      Within a few days, your healthcare team might change or even stop your antibiotic.    Your team may start you on an antibiotic while they are working to find out what is making you sick.    Your team might change your antibiotic because test results show that a different antibiotic would be better to treat your infection.    In some cases, once your team has more information,  they learn that you do not need an antibiotic at all. They may find out that you don't have an infection, or that the antibiotic you're taking won't work against your infection. For example, an infection caused by a virus can't be treated with antibiotics. Staying on an antibiotic when you don't need it is more likely to be harmful than helpful.      You may experience side effects from your antibiotic.    Like all medications, antibiotics have side effects. Some of these can be serious.    Let you healthcare team know if you have any known allergies when you are admitted to the hospital.    One significant side effect of nearly all antibiotics is the risk of severe and sometimes deadly diarrhea caused by Clostridium difficile (C. Difficile). This occurs when a person takes antibiotics because some good germs are destroyed. Antibiotic use allows C. diificile to take over, putting patients at high risk for this serious infection.    As a patient or caregiver, it is important to understand your or your loved one's antibiotic treatment. It is especially important for caregivers to speak up when patients can't speak for themselves. Here are some important questions to ask your healthcare team.    What infection is this antibiotic treating and how do you know I have that infection?    What side effects might occur from this antibiotic?    How long will I need to take this antibiotic?    Is it safe to take this antibiotic with other medications or supplements (e.g., vitamins) that I am taking?     Are there any special directions I need to know about taking this antibiotic? For example, should I take it with food?    How will I be monitored to know whether my infection is responding to the antibiotic?    What tests may help to make sure the right antibiotic is prescribed for me?      Information provided by:  www.cdc.gov/getsmart  U.S. Department of Health and Human Services  Centers for disease Control and  Prevention  National Center for Emerging and Zoonotic Infectious Diseases  Division of Healthcare Quality Promotion             Medication List: This is a list of all your medications and when to take them. Check marks below indicate your daily home schedule. Keep this list as a reference.      Medications           Morning Afternoon Evening Bedtime As Needed    amoxicillin-clavulanate 875-125 MG per tablet   Commonly known as:  AUGMENTIN   Take 1 tablet by mouth 2 times daily for 10 days                                levETIRAcetam 1000 MG Tabs   Take 1,000 mg by mouth every morning RX is for 1000 mg q12h but he takes 1000 mg daily.   Last time this was given:  1,000 mg on 9/9/2018  8:02 AM                                metroNIDAZOLE 0.75 % topical gel   Commonly known as:  METROGEL   Apply topically every morning Apply to nose   Last time this was given:  9/9/2018  8:03 AM

## 2018-09-05 NOTE — IP AVS SNAPSHOT
[place] [address] [facility]    [address] [place] [address]    Phone:  [phone]                                       After Visit Summary   9/5/2018    Jorge L Townsend    MRN: [id number]           After Visit Summary Signature Page     I have received my discharge instructions, and my questions have been answered. I have discussed any challenges I see with this plan with the nurse or doctor.    ..........................................................................................................................................  Patient/Patient Representative Signature      ..........................................................................................................................................  Patient Representative Print Name and Relationship to Patient    ..................................................               ................................................  Date                                            Time    ..........................................................................................................................................  Reviewed by Signature/Title    ...................................................              ..............................................  Date                                                            Time          22EPIC Rev 08/18

## 2018-09-05 NOTE — CONSULTS
THORACIC SURGERY  Consult Note    DATE OF ADMISSION:  9/5/2018      REASON FOR CONSULT:  Empyema    REQUESTING PROVIDER:  Villa HUTCHINSON    HISTORY OF PRESENT ILLNESS:  Mr Townsend is a 51 yo gentleman well known to our service from a right spontaneous pneumothorax 8/2018 managed conservatively with a chest tube as patient was adamantly opposed to surgical intervention.  Patient was discharged home with a Pneumostat in place due to ongoing air leak.  Air leak persisted for several weeks.  Patient was seen 8/20/2018 for a routine Pneumostat check and at that time the output from the chest tube was noted to be thick yellow and malodorous.  He was afebrile and denied constitutional symptoms.  He was asked to return to clinic 8/22 with a chest CT to evaluate for empyema.  CT Chest 8/22/2018 at [facility] revealed a small-to-moderate pneumothorax at the right lung base, increased from 8/9, with an unchanged apical pneumothorax, irregular area of consolidation and patchy groundglass opacity in the right upper lobe, mildly enlarged precarinal lymph nodes, emphysema, and diffuse fatty liver.  He was seen again in Thoracic Surgery clinic 8/22/2018 with no air leak noted.  He was instructed to clamp the chest tube on Sunday, 8/26, if he continued to not have an air leak and to return 8/27 with a CXR to see if the tube could be removed.      Patient seen back in clinic 8/27/2018 after clamping trial with relatively stable basilar pneumothorax. Tube was removed in clinic.  Ongoing cloudy drainage was noted but patient remained afebrile with normal vitals and no complaints of general symptoms.  He was instructed to go to the ER if he developed a fever, chills, or other new or concerning symptoms.     Patient returned to Thoracic Surgery clinic today for evaluation.  He has been starting to feel poorly and has lost weight over the last week.  He continues to remain adamantly against surgical intervention.  CXR today shows  increased opacification of the right lung base suggestive of an enlarging right pleural effusion.  Given systemic symptoms and already heightened concern for empyema, patient was directed to [facility] where he was graciously admitted as a direct admission by the Hospitalist service.      Patient seen on station [id number] where he is resting somewhat comfortably.  He remains very anxious and continues to stress that he does not want another thoracotomy because his thoracotomy many years ago was so painful.  He has lost 4 pounds over the week due to poor appetite.  No fever, chills.  He is not sleeping well but this may be in part due to having a new cat in the house.      PAST MEDICAL HISTORY:   Past Medical History:   Diagnosis Date     Alcohol dependence (H) 3/2/2015     Back pain      CARDIOVASCULAR SCREENING; LDL GOAL LESS THAN 160      Essential tremor      Other chronic nonalcoholic liver disease 3/2/2015     Pneumothorax     1983's     Spontaneous right pneumothorax 8/2018 managed conservatively with chest tube.     Suspected cognitive impairment     Lung nodules, right lung.  Workup for right-sided spontaneous pneumothorax 8/2018 notable for incidentally found right lung nodules concerning for malignancy.  PET/CT 8/9/2018 showed increased metabolic activity in the right lung including an irregular spiculated lesion in the right upper lobe, lateral right mid lung, a groundglass opacity in the anterior medial right upper lobe, an additional FDG avid foci in the lower lobes. On 8/14/2018, he underwent a CT-guided biopsy of the right lung lesion which revealed chronic inflammatory change without evidence of malignancy.     SURGICAL HISTORY:   Past Surgical History:   Procedure Laterality Date     THORACIC SURGERY, left thoracotomy        FAMILY HISTORY:    Family History   Problem Relation Age of Onset     Cancer Father      Cerebrovascular Disease Sister      Diabetes No family hx of       "Hypertension No family hx of      Thyroid Disease No family hx of      Glaucoma No family hx of      Macular Degeneration No family hx of      SOCIAL HISTORY:   Social History     Social History     Marital status: Single     Spouse name: N/A     Number of children: N/A     Years of education: N/A     Occupational History     Not on file.     Social History Main Topics     Smoking status: Former Smoker     Smokeless tobacco: Never Used      Comment: quit 2003     Alcohol use Yes      Comment: \"2-3 straight vodkas nightly \"      Drug use: No      Comment: \"used to\"      Sexual activity: Not Currently     Other Topics Concern     Not on file     Social History Narrative     ALLERGIES:   No Known Allergies    MEDICATIONS:  Prescriptions Prior to Admission   Medication Sig Dispense Refill Last Dose     levETIRAcetam 1000 MG TABS Take 1 tablet by mouth daily   8/13/2018 at Unknown time     metroNIDAZOLE (METROGEL) 0.75 % topical gel Apply topically 2 times daily Apply to nose   8/13/2018 at Unknown time     oxyCODONE-acetaminophen (PERCOCET) 5-325 MG per tablet Take 1-2 tablets by mouth every 4 hours as needed for pain 12 tablet 0      REVIEW OF SYSTEMS:  14 point comprehensive review of systems undertaken with pertinent positives and negatives as above and otherwise unremarkable.    PHYSICAL EXAM:  VITALS: /75  Pulse 94  Temp 97.4  F (36.3  C) (Oral)  Resp 18  Ht 1.981 m (6' 6\")  Wt 77.3 kg (170 lb 6.4 oz)  SpO2 95%  BMI 19.69 kg/m2  General Appearance:  Pleasant, cooperative, alert. Well developed, well nourished.  Appears mildly cognitively impaired.    HEENT: Normocephalic, atraumatic.  Extra occular mm intact.  Sclera clear. PER   Lungs: Clear to auscultation bilaterally   Heart: Fast but regular rhythm  Abdomen: Soft, nontender non distended.    Musculoskeletal:  Moving x 4 spontaneously with CMS intact x4.     Neuro: Alert and oriented x3.  CN II-XII grossly intact and symmetric. Moderately tremulous. "        LABORATORY DATA:  Recent Labs   Lab Test  08/06/18   1100  08/05/18   1433  01/11/16   0840   NA  137  139  136   POTASSIUM  3.7  4.6  3.8   CHLORIDE  103  105  102   CO2  25  27  25   BUN  16  15  8   CR  0.69  0.76  0.70     Recent Labs   Lab Test  08/06/18   1100  08/05/18   1433  01/20/16   1340   WBC  6.4  6.0  7.8   HGB  13.4  13.3  14.0   PLT  133*  134*  371     Recent Labs   Lab Test  08/05/18   1433   INR  0.95       IMAGING:  CT from 8/22/2018 and CXR from 8/27 and today reviewed personally.      IMPRESSION: 53 yo gentleman with history of alcohol abuse, suspected cognitive impairment, and recent spontaneous right pneumothorax managed conservatively with chest tube admitted today to [facility] from Thoracic Surgery clinic for suspected empyema.       PLAN:   Empyema.   Had a chest tube in for several weeks due to ongoing air leak and patient's refusal to proceed with surgery.  As far back as 8/20, was noted to be somewhat unclean and the chest tube was putting out thick yellow-tan, malodorous fluid.  He was managed conservatively due to lack of symptoms but now reports anorexia and weight loss.  Continue to trend fever curve.  Patient remains adamantly opposed to surgery.  Recommend CT Chest and U/S or CT-guided chest tube into right lung base to drain that area.  Fluid should be sent for culture and cell count.  Prefer to hold off on antibiotics until fluid is obtained if possible.  If unable to fully drain area with tube alone, may need to consider lytics.  Surgery is a last resort.  Appreciate assistance of Hospitalist and ID with this patient.     TOTAL TIME SPENT:  70 min spent on floor and in consultation and coordination of care.     PING VILLASEÑOR, MS, PA-C with Dr Osorio

## 2018-09-06 ENCOUNTER — APPOINTMENT (OUTPATIENT)
Dept: CT IMAGING | Facility: CLINIC | Age: 53
End: 2018-09-06
Attending: INTERNAL MEDICINE
Payer: COMMERCIAL

## 2018-09-06 LAB
ANION GAP SERPL CALCULATED.3IONS-SCNC: 9 MMOL/L (ref 3–14)
APPEARANCE FLD: NORMAL
BACTERIA SPEC CULT: NORMAL
BASOPHILS NFR FLD MANUAL: 1 %
BUN SERPL-MCNC: 12 MG/DL (ref 7–30)
CALCIUM SERPL-MCNC: 8.9 MG/DL (ref 8.5–10.1)
CHLORIDE SERPL-SCNC: 100 MMOL/L (ref 94–109)
CO2 SERPL-SCNC: 27 MMOL/L (ref 20–32)
COLOR FLD: YELLOW
CREAT SERPL-MCNC: 0.71 MG/DL (ref 0.66–1.25)
EOSINOPHIL NFR FLD MANUAL: 26 %
ERYTHROCYTE [DISTWIDTH] IN BLOOD BY AUTOMATED COUNT: 14 % (ref 10–15)
GFR SERPL CREATININE-BSD FRML MDRD: >90 ML/MIN/1.7M2
GLUCOSE FLD-MCNC: 75 MG/DL
GLUCOSE SERPL-MCNC: 99 MG/DL (ref 70–99)
GRAM STN SPEC: NORMAL
HCT VFR BLD AUTO: 38.5 % (ref 40–53)
HGB BLD-MCNC: 12.8 G/DL (ref 13.3–17.7)
INR PPP: 1.05 (ref 0.86–1.14)
LDH FLD L TO P-CCNC: 496 U/L
LDH SERPL L TO P-CCNC: 187 U/L (ref 85–227)
LYMPHOCYTES NFR FLD MANUAL: 4 %
MCH RBC QN AUTO: 32.4 PG (ref 26.5–33)
MCHC RBC AUTO-ENTMCNC: 33.2 G/DL (ref 31.5–36.5)
MCV RBC AUTO: 98 FL (ref 78–100)
NEUTS BAND NFR FLD MANUAL: 69 %
PH FLD: 7.5 PH
PLATELET # BLD AUTO: 207 10E9/L (ref 150–450)
POTASSIUM SERPL-SCNC: 4.3 MMOL/L (ref 3.4–5.3)
PROT FLD-MCNC: 5.7 G/DL
PROT SERPL-MCNC: 7.2 G/DL (ref 6.8–8.8)
RBC # BLD AUTO: 3.95 10E12/L (ref 4.4–5.9)
SODIUM SERPL-SCNC: 136 MMOL/L (ref 133–144)
SPECIMEN SOURCE FLD: NORMAL
SPECIMEN SOURCE: NORMAL
WBC # BLD AUTO: 6.9 10E9/L (ref 4–11)
WBC # FLD AUTO: 4225 /UL

## 2018-09-06 PROCEDURE — 12000007 ZZH R&B INTERMEDIATE

## 2018-09-06 PROCEDURE — 36415 COLL VENOUS BLD VENIPUNCTURE: CPT | Performed by: RADIOLOGY

## 2018-09-06 PROCEDURE — 0W9930Z DRAINAGE OF RIGHT PLEURAL CAVITY WITH DRAINAGE DEVICE, PERCUTANEOUS APPROACH: ICD-10-PCS | Performed by: RADIOLOGY

## 2018-09-06 PROCEDURE — 83615 LACTATE (LD) (LDH) ENZYME: CPT | Performed by: PHYSICIAN ASSISTANT

## 2018-09-06 PROCEDURE — 25000128 H RX IP 250 OP 636: Performed by: INTERNAL MEDICINE

## 2018-09-06 PROCEDURE — 25000128 H RX IP 250 OP 636: Performed by: PHYSICIAN ASSISTANT

## 2018-09-06 PROCEDURE — 71260 CT THORAX DX C+: CPT

## 2018-09-06 PROCEDURE — 40000863 ZZH STATISTIC RADIOLOGY XRAY, US, CT, MAR, NM

## 2018-09-06 PROCEDURE — 83986 ASSAY PH BODY FLUID NOS: CPT | Performed by: PHYSICIAN ASSISTANT

## 2018-09-06 PROCEDURE — 89051 BODY FLUID CELL COUNT: CPT | Performed by: PHYSICIAN ASSISTANT

## 2018-09-06 PROCEDURE — 25000128 H RX IP 250 OP 636: Performed by: RADIOLOGY

## 2018-09-06 PROCEDURE — 36415 COLL VENOUS BLD VENIPUNCTURE: CPT | Performed by: PHYSICIAN ASSISTANT

## 2018-09-06 PROCEDURE — 84155 ASSAY OF PROTEIN SERUM: CPT | Performed by: PHYSICIAN ASSISTANT

## 2018-09-06 PROCEDURE — 25000132 ZZH RX MED GY IP 250 OP 250 PS 637: Performed by: PHYSICIAN ASSISTANT

## 2018-09-06 PROCEDURE — 25000125 ZZHC RX 250: Performed by: INTERNAL MEDICINE

## 2018-09-06 PROCEDURE — 82945 GLUCOSE OTHER FLUID: CPT | Performed by: PHYSICIAN ASSISTANT

## 2018-09-06 PROCEDURE — 87070 CULTURE OTHR SPECIMN AEROBIC: CPT | Performed by: PHYSICIAN ASSISTANT

## 2018-09-06 PROCEDURE — 84157 ASSAY OF PROTEIN OTHER: CPT | Performed by: PHYSICIAN ASSISTANT

## 2018-09-06 PROCEDURE — 87205 SMEAR GRAM STAIN: CPT | Performed by: PHYSICIAN ASSISTANT

## 2018-09-06 PROCEDURE — 80048 BASIC METABOLIC PNL TOTAL CA: CPT | Performed by: PHYSICIAN ASSISTANT

## 2018-09-06 PROCEDURE — 85610 PROTHROMBIN TIME: CPT | Performed by: RADIOLOGY

## 2018-09-06 PROCEDURE — 32557 INSERT CATH PLEURA W/ IMAGE: CPT

## 2018-09-06 PROCEDURE — 85027 COMPLETE CBC AUTOMATED: CPT | Performed by: PHYSICIAN ASSISTANT

## 2018-09-06 PROCEDURE — 99232 SBSQ HOSP IP/OBS MODERATE 35: CPT | Performed by: PHYSICIAN ASSISTANT

## 2018-09-06 RX ORDER — LIDOCAINE HYDROCHLORIDE 10 MG/ML
10 INJECTION, SOLUTION EPIDURAL; INFILTRATION; INTRACAUDAL; PERINEURAL ONCE
Status: COMPLETED | OUTPATIENT
Start: 2018-09-06 | End: 2018-09-06

## 2018-09-06 RX ORDER — FENTANYL CITRATE 50 UG/ML
25-50 INJECTION, SOLUTION INTRAMUSCULAR; INTRAVENOUS EVERY 5 MIN PRN
Status: DISCONTINUED | OUTPATIENT
Start: 2018-09-06 | End: 2018-09-06

## 2018-09-06 RX ORDER — PIPERACILLIN SODIUM, TAZOBACTAM SODIUM 3; .375 G/15ML; G/15ML
3.38 INJECTION, POWDER, LYOPHILIZED, FOR SOLUTION INTRAVENOUS EVERY 6 HOURS
Status: DISCONTINUED | OUTPATIENT
Start: 2018-09-06 | End: 2018-09-09 | Stop reason: HOSPADM

## 2018-09-06 RX ORDER — IOPAMIDOL 755 MG/ML
80 INJECTION, SOLUTION INTRAVASCULAR ONCE
Status: COMPLETED | OUTPATIENT
Start: 2018-09-06 | End: 2018-09-06

## 2018-09-06 RX ORDER — FLUMAZENIL 0.1 MG/ML
0.2 INJECTION, SOLUTION INTRAVENOUS
Status: DISCONTINUED | OUTPATIENT
Start: 2018-09-06 | End: 2018-09-06

## 2018-09-06 RX ORDER — NALOXONE HYDROCHLORIDE 0.4 MG/ML
.1-.4 INJECTION, SOLUTION INTRAMUSCULAR; INTRAVENOUS; SUBCUTANEOUS
Status: DISCONTINUED | OUTPATIENT
Start: 2018-09-06 | End: 2018-09-06

## 2018-09-06 RX ADMIN — FENTANYL CITRATE 50 MCG: 50 INJECTION INTRAMUSCULAR; INTRAVENOUS at 11:02

## 2018-09-06 RX ADMIN — IOPAMIDOL 80 ML: 755 INJECTION, SOLUTION INTRAVENOUS at 10:42

## 2018-09-06 RX ADMIN — PIPERACILLIN SODIUM,TAZOBACTAM SODIUM 3.38 G: 3; .375 INJECTION, POWDER, FOR SOLUTION INTRAVENOUS at 23:30

## 2018-09-06 RX ADMIN — PIPERACILLIN SODIUM,TAZOBACTAM SODIUM 3.38 G: 3; .375 INJECTION, POWDER, FOR SOLUTION INTRAVENOUS at 18:29

## 2018-09-06 RX ADMIN — MIDAZOLAM HYDROCHLORIDE 1 MG: 1 INJECTION, SOLUTION INTRAMUSCULAR; INTRAVENOUS at 11:03

## 2018-09-06 RX ADMIN — SODIUM CHLORIDE: 9 INJECTION, SOLUTION INTRAVENOUS at 04:56

## 2018-09-06 RX ADMIN — LEVETIRACETAM 1000 MG: 500 TABLET, FILM COATED ORAL at 08:17

## 2018-09-06 RX ADMIN — METRONIDAZOLE: 7.5 GEL TOPICAL at 08:18

## 2018-09-06 RX ADMIN — SODIUM CHLORIDE, PRESERVATIVE FREE 70 ML: 5 INJECTION INTRAVENOUS at 10:42

## 2018-09-06 RX ADMIN — LIDOCAINE HYDROCHLORIDE 4 ML: 10 INJECTION, SOLUTION EPIDURAL; INFILTRATION; INTRACAUDAL; PERINEURAL at 11:13

## 2018-09-06 ASSESSMENT — ACTIVITIES OF DAILY LIVING (ADL)
ADLS_ACUITY_SCORE: 11

## 2018-09-06 NOTE — CONSULTS
,  [facility]    Infectious Disease Consultation     Date of Admission:  9/5/2018  Date of Consult (When I saw the patient): 09/06/18    Assessment & Plan   Jorge L Townsend is a 52 year old male who was admitted on 9/5/2018.     Impression:  1. 52 y.o male with h/o spontaneous pneumothorax in 8/2018. Management was conservative with chest tube placement, complicated with ongoing air leak. Finally chest tube removed August 27.   2. Admitted this occasion with history of yellow, foul smelling discharge from the chest tube late August, and increasing systemic symptoms since the removal of the chest tube.   3. S/P chest tube placement by IR. Cultures from the fluid sent and pending.     Recommendations:   Would recommend zosyn for now.   Follow up on  The cultures from the fluid.       Yoly Munson MD    Reason for Consult   Reason for consult: I was asked by Dr. Osorio  to evaluate this patient for empyema.    Primary Care Physician   Oskar Arthur    Chief Complaint   Generalized malaise     History is obtained from the patient and medical records    History of Present Illness   Jorge L Townsend is a 52 year old male who first seek care for spontaneous pneumothorax 8/2018 managed conservatively with a chest tube. Patient was discharged home with a Pneumostat in place due to ongoing air leak.  Air leak persisted for several weeks.  Patient was seen 8/20/2018 for a routine Pneumostat check and at that time the output from the chest tube was noted to be thick yellow and malodorous.  He was afebrile and denied constitutional symptoms.  On 8/27/2018 the tube was removed in clinic.  Ongoing cloudy drainage was noted but patient remained afebrile but since then he has continued to feel poorly and was directed to [facility] from Dr. Osorio clinic. Since then he has had a CT guided chest tube placement done by IR.        Past Medical History   I have reviewed this patient's medical history and  updated it with pertinent information if needed.   Past Medical History:   Diagnosis Date     Alcohol dependence (H) 3/2/2015     Back pain      CARDIOVASCULAR SCREENING; LDL GOAL LESS THAN 160      Essential tremor      Other chronic nonalcoholic liver disease 3/2/2015     Pneumothorax     1983's       Past Surgical History   I have reviewed this patient's surgical history and updated it with pertinent information if needed.  Past Surgical History:   Procedure Laterality Date     THORACIC SURGERY         Prior to Admission Medications   Prior to Admission Medications   Prescriptions Last Dose Informant Patient Reported? Taking?   levETIRAcetam 1000 MG TABS 9/5/2018 at am Self Yes Yes   Sig: Take 1,000 mg by mouth every morning RX is for 1000 mg q12h but he takes 1000 mg daily.   metroNIDAZOLE (METROGEL) 0.75 % topical gel 9/5/2018 at am Self Yes Yes   Sig: Apply topically every morning Apply to nose       Facility-Administered Medications: None     Allergies   No Known Allergies    Immunization History   Immunization History   Administered Date(s) Administered     TDAP Vaccine (Adacel) 01/04/2015       Social History   I have reviewed this patient's social history and updated it with pertinent information if needed. Jorge L Townsend  reports that he has quit smoking. He has never used smokeless tobacco. He reports that he drinks alcohol. He reports that he does not use illicit drugs.    Family History   I have reviewed this patient's family history and updated it with pertinent information if needed.   Family History   Problem Relation Age of Onset     Cancer Father      Cerebrovascular Disease Sister      Diabetes No family hx of      Hypertension No family hx of      Thyroid Disease No family hx of      Glaucoma No family hx of      Macular Degeneration No family hx of        Review of Systems   The 10 point Review of Systems is negative other than noted in the HPI or here.     Physical Exam   Temp: 99.1  F (37.3   C) Temp src: Oral BP: (!) 130/93 Pulse: 113   Resp: 14 SpO2: 92 % O2 Device: None (Room air)    Vital Signs with Ranges  Temp:  [97.4  F (36.3  C)-99.4  F (37.4  C)] 99.1  F (37.3  C)  Pulse:  [] 113  Resp:  [14-20] 14  BP: (107-135)/(71-93) 130/93  SpO2:  [92 %-100 %] 92 %  170 lbs 6.4 oz  Body mass index is 19.69 kg/(m^2).    GENERAL APPEARANCE:  alert and no distress  EYES: Eyes grossly normal to inspection, PERRL and conjunctivae and sclerae normal  HENT: ear canals and TM's normal and nose and mouth without ulcers or lesions  NECK: no adenopathy, no asymmetry, masses, or scars and thyroid normal to palpation  RESP:  Chest tube in place   CV: regular rates and rhythm, normal S1 S2, no S3 or S4 and no murmur, click or rub  LYMPHATICS: normal ant/post cervical and supraclavicular nodes  ABDOMEN: soft, nontender, without hepatosplenomegaly or masses and bowel sounds normal  MS: extremities normal- no gross deformities noted  SKIN: no suspicious lesions or rashes      Data   Lab Results   Component Value Date    WBC 6.9 09/06/2018    HGB 12.8 (L) 09/06/2018    HCT 38.5 (L) 09/06/2018     09/06/2018     09/06/2018    POTASSIUM 4.3 09/06/2018    CHLORIDE 100 09/06/2018    CO2 27 09/06/2018    BUN 12 09/06/2018    CR 0.71 09/06/2018    GLC 99 09/06/2018    AST 41 08/06/2018    ALT 32 08/06/2018    ALKPHOS 86 08/06/2018    BILITOTAL 2.2 (H) 08/06/2018    INR 1.05 09/06/2018       Recent Labs  Lab 09/05/18 2030   CULT No growth after 15 hours  No growth after 15 hours     Recent Labs   Lab Test  09/05/18 2030   CULT  No growth after 15 hours  No growth after 15 hours

## 2018-09-06 NOTE — PROGRESS NOTES
THORACIC SURGERY    CT in place  1600 ml out  No air leak    CT suction for now    ID consult  Fluid for stains and cultures    Discussed    PHOEBE MEDINA MD [facility] ONCOLOGY THORACIC SURGERY  CELL:  [phone]  OFFICE: [phone]

## 2018-09-06 NOTE — H&P
H and P dictated    Active Problems:    Empyema lung -- S/P prior thoracotomy, but residual right loculated effusion consistent with persist empyema.        Alcohol abuse    Plan -- admit for chest CT and hopefully radiology can place a chest tube to drain the loculated collection, and thoracic surgery to follow.  Will await fluid and culture prior starting antibiotics, and get ID consult.    Abdirashid Varner   Pager: [phone]  Cell Phone:  [phone]

## 2018-09-06 NOTE — PHARMACY-ADMISSION MEDICATION HISTORY
"Admission medication history interview status for the 9/5/2018  admission is complete. See EPIC admission navigator for prior to admission medications     Medication history source reliability:Good    Actions taken by pharmacist (provider contacted, etc):None     Additional medication history information not noted on PTA med list :None    Medication reconciliation/reorder completed by provider prior to medication history? No    Time spent in this activity: 15\"    Prior to Admission medications    Medication Sig Last Dose Taking? Auth Provider   levETIRAcetam 1000 MG TABS Take 1,000 mg by mouth every morning RX is for 1000 mg q12h but he takes 1000 mg daily. 9/5/2018 at am Yes Unknown, Entered By History   metroNIDAZOLE (METROGEL) 0.75 % topical gel Apply topically every morning Apply to nose  9/5/2018 at am Yes Unknown, Entered By History         "

## 2018-09-06 NOTE — PROGRESS NOTES
[facility]    Hospitalist Progress Note    Assessment & Plan   Jorge L Townsend is a pleasant 52-year-old male with a past medical history of spontaneous pneumothorax, most recently right pneumothorax on 2018, managed conservatively with chest tube, along with history of lung nodules and left thoracotomy, who presented as a direct admission to [facility] on 18 due to concerns for developing right lung empyema.  He has been admitted to the hospital for further Thoracic Surgery evaluation and management.      Suspected right lung empyema with history of recent spontaneous right pneumothorax in 2018, managed conservatively with chest tube.    The patient did have a persistent air leak on his previous hospitalization.  He had a Pneumostat placed prior to discharge, with noted thick, yellow malodorous drainage.  CT chest on 18 revealed small to moderate pneumothorax of the right lung base, which was increased from previous with an unchanged apical pneumothorax, irregular area of consolidation, patchy ground-glass opacity in the right upper lobe, mildly enlarged precarinal lymph nodes, emphysema and diffuse fatty liver.  He clamped his tube on , 2018, and Pneumostat was removed on 2018.  Ongoing cloudy drainage was noted, but the patient remained afebrile with normal vital signs.  He is now beginning to feel worse, but still without any fever or chills.  There is concern for developing empyema. Blood cultures NGTD. Labs unremarkable, Hgb stable.  Chest CT with contrast 18: Large Rt sided hydropneumothorax. Portions of pleural fluid appear loculated superiorly.   - CT guided Chest tube placed 18    - Continue to trend fever curve Temp (24hrs), Av.7  F (37.1  C), Min:97.4  F (36.3  C), Max:99.4  F (37.4  C)  - The patient remains adamantly opposed to surgery  - Thoracic Surgery on board, d/w Dr. Devon sherman  - RN sending fluid for culture and cell  count, sample not obtained during chest tube insertion today.  - Hold off on antibiotics until fluid is obtained, discussed with RN  - If unable to fully drain area with tube alone, may need to consider lytics  - Repeat CXR daily now that chest tube placed, per thoracic surgery request  - Infectious disease consult pending  - Encourage IS frequently  - Repeat BMP/CBC in AM  - No overt need to repeat blood cultures at this time unless recommended by ID  - Regular diet, continue NS 75 ml/hr overnight, may discontinue tomorrow pending oral intake and chest tube output (output ~1800 ml in ~3 hours)     History of alcohol dependence.    The patient reports drinking 2-3 hard liquor beverages daily.  He denies any history of withdrawal symptoms.  He is mildly tremulous on admission. Denies current symptoms.  - Monitor for any signs of alcohol withdrawal and will initiate CIWA protocol with Valium if needed.        Will continue with Keppra 1000 mg daily.   I do not see any documentation of seizure disorder from our medical record.     # Pain Assessment:  Current Pain Score 9/6/2018   Patient currently in pain? denies   Pain score (0-10) -   Pain location -   Jorge L márquez pain level was assessed and he currently denies pain.      DVT Prophylaxis: Pneumatic Compression Devices  Code Status: Full Code    Disposition: Expected discharge per thoracic surgery when fluid collection drained and plan for chest tube established.    This patient was discussed with Dr. Ruiz of the Hospitalist Service who agrees with current plans as outlined above.    Cristina Cain PA-C  Text Page (7am to 6pm)  Interval History   VSS. Feeling ok, mild pain to site of chest tube insertion. Denies chest pain, dyspnea, or palpitations. No N/V. +Appetite. Last BM today. No trouble taking deep breaths. Using incentive spirometer intermittently.    -Data reviewed today: I reviewed all new labs and imaging results over the last 24 hours. I personally reviewed  no images or EKG's today.    Physical Exam   Temp: 99.1  F (37.3  C) Temp src: Oral BP: (!) 130/93 Pulse: 113   Resp: 14 SpO2: 92 % O2 Device: None (Room air)    Vitals:    09/05/18 1617   Weight: 77.3 kg (170 lb 6.4 oz)     Vital Signs with Ranges  Temp:  [97.4  F (36.3  C)-99.4  F (37.4  C)] 99.1  F (37.3  C)  Pulse:  [] 113  Resp:  [14-20] 14  BP: (107-135)/(71-93) 130/93  SpO2:  [92 %-100 %] 92 %  I/O last 3 completed shifts:  In: 1001 [P.O.:510; I.V.:491]  Out: 3090 [Urine:1400; Chest Tube:1690]    Constitutional: Awake, alert, cooperative, no apparent distress  Respiratory: Clear to auscultation bilaterally upper fields, RLL with course breath sounds/rales, no wheezing. Rt lower thorax with chest tube in place, dressing clean/dry/intact. Pleural drainage dark sera appearing ~1800 ml in canister. No airleak with cough.  Cardiovascular: Regular rate and rhythm, normal S1 and S2, and no murmur noted  GI: Normal bowel sounds, soft, non-distended, non-tender  Skin/Integumen: No rashes, no cyanosis, no edema.     Medications     sodium chloride 75 mL/hr at 09/06/18 0456       levETIRAcetam  1,000 mg Oral Daily     metroNIDAZOLE   Topical QAM     sodium chloride (PF)  3 mL Intracatheter Q8H       Data     Recent Labs  Lab 09/06/18  0832 09/06/18  0734 09/05/18  2030   WBC  --  6.9 7.8   HGB  --  12.8* 13.0*   MCV  --  98 97   PLT  --  207 188   INR 1.05  --   --    NA  --  136 135   POTASSIUM  --  4.3 4.0   CHLORIDE  --  100 96   CO2  --  27 29   BUN  --  12 13   CR  --  0.71 0.69   ANIONGAP  --  9 10   MIKE  --  8.9 9.0   GLC  --  99 103*   PROTTOTAL  --  7.2  --        Imaging:   Recent Results (from the past 24 hour(s))   CT Chest w Contrast    Narrative    CT CHEST WITH CONTRAST September 6, 2018 10:42 AM     HISTORY: Empyema.    TECHNIQUE: 80mL Isovue-370. Radiation dose for this scan was reduced  using automated exposure control, adjustment of the mA and/or kV  according to patient size, or iterative  reconstruction technique.    COMPARISON: 8/14/2018.    FINDINGS: There is a large right hydropneumothorax. Portions of the  pleural fluid appear loculated superiorly. No evidence of tension. No  pericardial or left pleural effusion. The lungs are emphysematous. No  discrete pulmonary nodule or mass demonstrated. Prominent mediastinal  lymph nodes are unchanged. No hilar or axillary adenopathy. Images  through the upper abdomen demonstrate fatty infiltration of the liver.      Impression    IMPRESSION: Large right-sided hydropneumothorax. Please see separate  report describing CT-guided chest tube placement.    CLIVE JALLOH MD   CT Chest Tube with Cath Placement    Narrative    CT GUIDED CHEST TUBE PLACEMENT 9/6/2018 11:19 AM     HISTORY: Right hydropneumothorax.     COMPARISON: None.     TECHNIQUE: Volumetric helical acquisition of CT images without  contrast. Radiation dose for this scan was reduced using automated  exposure control, adjustment of the mA and/or kV according to patient  size, or iterative reconstruction technique.    MEDICATIONS:  4 mL Lidocaine 1% locally, ANGELA Boone, 10 minutes  sedation time, 1mg Versed, 50 mcg Fentanyl.    TECHNIQUE: Informed consent was obtained from the patient. A timeout  was performed. Patient received conscious sedation with Versed and  fentanyl with constant monitoring by the physician and nursing staff.  Total sedation time was 10 minutes. The patient was scanned in supine  position for procedural planning. The skin was prepped and draped in a  sterile manner, and anesthetized with 1% lidocaine. The pleural space  was accessed using trocar technique and a 12 Italian locking pigtail  catheter was advanced into the pleural space and its position verified  with imaging. The catheter was secured to the skin using an adhesive  bandage. There were no immediate complications and patient left the CT  suite in satisfactory condition.     FINDINGS: Limited CT images  redemonstrate a large right  hydropneumothorax. Subsequent images demonstrate CT-guided chest tube  placement.      Impression    IMPRESSION: Successful CT guided right pigtail chest tube placement.    CLIVE JALLOH MD

## 2018-09-06 NOTE — PLAN OF CARE
Problem: Patient Care Overview  Goal: Plan of Care/Patient Progress Review  Outcome: Improving  Direct admit that arrived to the floor at approxPatient is alert and oriented X4, vital signs within defined limits ex. tele sinus tach 100- 110's. Lung sounds diminished, no cough. IS to 1200. No complaints of pain. NPO at this time pending procedure, possible chest tube placement tomorrow.

## 2018-09-06 NOTE — PROCEDURES
RADIOLOGY POST PROCEDURE NOTE w/ SEDATION  Patient name: Jorge L Townsend  MRN: [id number]  : 1965    Pre-procedure diagnosis: right hydropneumothorax  Post-procedure diagnosis: Same    Procedure Date/Time: 2018  11:13 AM  Procedure: CT guided chest tube  Estimated blood loss: None  Specimen(s) collected with description: none    I determined this patient to be an appropriate candidate for the planned sedation and procedure and reassessed the patient IMMEDIATELY PRIOR to sedation and procedure.     The patient tolerated the procedure well with no immediate complications.  Significant findings:right hydroPTX    See imaging dictation for procedural details.    Provider name: Mikel Becerra  Assistant(s):None

## 2018-09-06 NOTE — PROGRESS NOTES
Right chest tube inserted in CT by Dr Becerra.  Pt tolerated very well with IV sedation of Versed 1 mg and Fentanyl 50 mcg--see MAR.  Chest tubes clamped and pt sent to floor on cart with transport.  Report call to Piedad MILLER.

## 2018-09-06 NOTE — H&P
Admitted:     09/05/2018      PRIMARY CARE PHYSICIAN:  Oskar Arthur MD      CHIEF COMPLAINT:  Empyema.      HISTORY OF PRESENT ILLNESS:  Jorge L Townsend is a pleasant 52-year-old male with a past medical history of spontaneous pneumothorax, most recently in 08/2018, managed conservatively with chest tube, along with history of lung nodules and suspected cognitive impairment, who presents as a direct admission due to concerns for empyema.  Again, the patient was hospitalized for a right spontaneous pneumothorax in 08/2018, managed conservatively with a chest tube, as the patient was adamantly opposed to surgical intervention.  He was discharged to home with a Pneumostat in placed due to ongoing air leak, which persisted for several weeks.  He was seen on 08/20/2018 for routine Pneumostat check, and at that time, the output from the chest tube was noted to be a thick, yellow and malodorous liquid.  He was afebrile.  He was seen again in clinic on 08/22/2018 for CT chest, which revealed a small to moderate pneumothorax at the right lung base, increased from 08/09/2018, with an unchanged apical pneumothorax, irregular area of consolidation and patchy ground-glass opacity in the right upper lobe, mildly enlarged precarinal lymph nodes, emphysema and diffuse fatty liver.  He was again seen in clinic 08/22/2018 with no air leak noted.  He was instructed to clamp the chest tube on Sunday, 08/26/2018, and on 08/27/2018, he was noted to have stable basilar pneumothorax and the tube was removed in clinic.  Ongoing cloudy drainage was noted, but the patient remained afebrile with normal vitals and no complaints of general symptoms.      The patient returned to [facility] today, 09/05/2018, for evaluation.  He started to feel poorly and lost approximately 5 pounds over the last week.  He continues to remain against surgical intervention.  CXR today shows increased opacification of the right lung base suggestive an  enlarging pleural effusion on the right.  Given his symptoms and concern for empyema, the patient was directed to [facility] for admission.      The patient is currently resting comfortably in his hospital bed.  He denies any fevers, chills, headache, lightheadedness, chest pain, abdominal pain, nausea, vomiting, diarrhea or dysuria.  He reports low appetite and weight loss.  He reports increased dyspnea on exertion, but not short of breath at rest.  He has been seen by Thoracic Surgery provider who plans for IR drainage of the right empyema tomorrow.      PAST MEDICAL HISTORY:   1.  Alcohol dependence, in remission.   2.  Chronic back pain.   3.  Essential tremor.   4.  Nonalcoholic liver disease.   5.  Pneumothorax in the 1980s.   6.  Spontaneous right pneumothorax, 08/2018, managed conservatively with chest tube.   7.  Suspected cognitive impairment.   8.  Lung nodules of the right lung.      PAST SURGICAL HISTORY:  Left thoracotomy.      FAMILY HISTORY:  Father with cancer, sister with a CVA, otherwise reviewed and noncontributory to this presentation.      SOCIAL HISTORY:  The patient is single.  He denies tobacco use, but did used to smoke and quit in 2003.  He has 2-3 alcoholic beverages nightly, but denies drinking excessively otherwise.  He denies illicit drug use.      PRIOR TO ADMISSION MEDICATIONS:    Prior to Admission medications    Medication Sig Last Dose Taking? Auth Provider   levETIRAcetam 1000 MG TABS Take 1,000 mg by mouth every morning RX is for 1000 mg q12h but he takes 1000 mg daily. 9/5/2018 at am Yes Unknown, Entered By History   metroNIDAZOLE (METROGEL) 0.75 % topical gel Apply topically every morning Apply to nose  9/5/2018 at am Yes Unknown, Entered By History     ALLERGIES:  NO KNOWN DRUG ALLERGIES.      REVIEW OF SYSTEMS:  A complete 10-point review of systems was performed and is negative other than the items previously mentioned above in HPI.      PHYSICAL  EXAMINATION:   VITAL SIGNS:  Blood pressure 129/75, heart rate 94 beats per minute, temperature 97.4, respiratory rate 18, oxygen saturation 95% on room air.   GENERAL:  The patient is alert, oriented to person, place and situation, cooperative, lying in bed, no apparent distress.   HEENT:  Eyes:  Pupils equal and round.  Extraocular movements are intact.  Sclerae are nonicteric.  Head is normocephalic.  Throat, mucosa and tongue appear moist.   NECK:  Supple.  No cervical adenopathy.   CARDIOVASCULAR:  Heart regular rate and rhythm, no murmurs, rubs or gallops.  Distal pulses are intact.  No edema.   PULMONARY:  Lungs are clear to auscultation bilaterally, no crackles, wheezes or rhonchi.  Breathing is nonlabored   GASTROINTESTINAL:  Abdomen is soft, nontender, nondistended with normoactive bowel sounds.   MUSCULOSKELETAL:  The patient moves all 4 extremities equally with normal strength.   NEUROLOGIC:  Alert.  Cranial nerves II-XII are grossly intact.  Motor function is intact.  No focal deficits.   SKIN:  Warm, dry, nondiaphoretic.   PSYCHIATRIC:  Normal mood and affect.      LABORATORY DATA:  Last labs available are reviewed in Epic from 08/06/2018.  Repeat CBC and BMP are pending.  We will obtain blood cultures.      IMAGING:  CT from 08/22/2018 and chest x-ray from 08/27/2018 have been reviewed, which show stable basilar pneumothorax on the right.      ASSESSMENT AND PLAN:  Jorge L Townsend is a pleasant 52-year-old male with a past medical history of spontaneous pneumothorax, most recently right pneumothorax on 08/2018, managed conservatively with chest tube, along with history of lung nodules and left thoracotomy, who presented as a direct admission to [facility] due to concerns for developing right lung empyema.  He has been admitted to the hospital for further Thoracic Surgery evaluation and management.     1.  Suspected right lung empyema with history of recent spontaneous right pneumothorax  in 08/2018, managed conservatively with chest tube.  The patient did have a persistent air leak on his previous hospitalization.  He had a Pneumostat placed prior to discharge, with noted thick, yellow malodorous drainage.  CT chest on 08/22/2018 revealed small to moderate pneumothorax of the right lung base, which was increased from previous with an unchanged apical pneumothorax, irregular area of consolidation, patchy ground-glass opacity in the right upper lobe, mildly enlarged precarinal lymph nodes, emphysema and diffuse fatty liver.  He clamped his tube on Sunday, 08/26/2018, and Pneumostat was removed on 08/27/2018.  Ongoing cloudy drainage was noted, but the patient remained afebrile with normal vital signs.  He is now beginning to feel worse, but still without any fever or chills.  There is concern for developing empyema.  We will continue to trend fever curve.  The patient remains adamantly opposed to surgery.  Recommend CT chest and ultrasound or CT-guided chest tube to the right lung base to drain that area.  Plans per Thoracic Surgery.  Discussed with LARISSA Lomeli.  Fluid will be sent for culture and cell count.  Hold off on antibiotics until fluid is obtained.  If unable to fully drain area with tube alone, may need to consider lytics.  We will obtain lab work and blood cultures.     2.  History of alcohol dependence.  The patient reports drinking 2-3 hard liquor beverages daily.  He denies any history of withdrawal symptoms.  He is mildly tremulous.  We will monitor for any signs of alcohol withdrawal and will initiate CIWA protocol with Valium if needed.       3.  Will continue with Keppra 1000 mg daily.  I do not see any documentation of seizure disorder from our medical record.     4.  DVT prophylaxis:  Mechanical, PCDs.      CODE STATUS:  FULL CODE, confirmed at time of admission.      This patient was discussed with Dr. Abdirashid Mckeon of the [facility].  He is in  agreement with my assessment and plan of care.         ABDIRASHID EVANS MD       As dictated by ZAFAR MAXWELL PA-C   Patient seen and examined.  Agree with impression and plan.     Abdirashid Evans  Pager: [phone]  Cell Phone:  [phone]             D: 2018   T: 2018   MT: CARMEL      Name:     ALON OLIVER   MRN:      [id number]        Account:      [id number]   :      1965        Admitted:     2018                   Document: [id number]       cc: Oskar Arthur MD

## 2018-09-06 NOTE — PLAN OF CARE
Problem: Patient Care Overview  Goal: Plan of Care/Patient Progress Review  Outcome: Improving  A&OX4, VSS on RA. Denies pain, n&V. CT of the Chest completed with placement of pigtail chest tube on the right. Chest tube with large output of yellow drainage. PA updated. Intermittent airleaks noted as well. Dressing to the site c/d/i. Denies SOB and CARO. Up independently, call appropriately. Tolerating regular diet. IVF at 75mL/hr. LS with coarse crackles in the RLL. Tele SR with occasional PACs. ID consulted. BCX negative so far.Continue to monitor.

## 2018-09-06 NOTE — CONSULTS
"CLINICAL NUTRITION SERVICES  -  ASSESSMENT NOTE      Malnutrition:   % Weight Loss:  > 2% in 1 week (severe malnutrition)  % Intake:  </= 50% for >/= 5 days (severe malnutrition)  Subcutaneous Fat Loss:  None observed  Muscle Loss:  None observed  Fluid Retention:  None noted    Malnutrition Diagnosis: Severe malnutrition  In Context of:  Acute illness or injury        REASON FOR ASSESSMENT  Jorge L Townsend is a 52 year old male seen by Registered Dietitian for Admission Nutrition Risk Screen -  Reduced oral intake over the last month    NUTRITION HISTORY  - Information obtained from the pt.  For the past week he's had nausea and vomitting with very little oral intake.      CURRENT NUTRITION ORDERS  Diet Order:     Starting regular diet     Current Intake/Tolerance:  Denies nausea, ready to order his first meal      PHYSICAL FINDINGS  Observed  No nutrition-related physical findings observed  Obtained from Chart/Interdisciplinary Team  None noted    ANTHROPOMETRICS  Height: 6' 6\"  Weight: 170 lbs 6.4 oz  Body mass index is 19.69 kg/(m^2).  Weight Status:  Normal BMI  IBW: 97.3 kg +/- 10%  % IBW: 79%  Weight History: Pt reports a 5# (3%) wt loss in the past week  Wt Readings from Last 10 Encounters:   09/05/18 77.3 kg (170 lb 6.4 oz)   08/22/18 79.4 kg (175 lb)   08/05/18 79.4 kg (175 lb)   04/10/18 76.2 kg (168 lb)   06/08/16 83.9 kg (185 lb)   01/20/16 77.6 kg (171 lb)   01/11/16 84.3 kg (185 lb 13.6 oz)   02/09/15 73 kg (161 lb)   01/22/15 77.1 kg (170 lb)   01/12/15 76.2 kg (168 lb)         LABS  Labs reviewed    MEDICATIONS  Medications reviewed      ASSESSED NUTRITION NEEDS PER APPROVED PRACTICE GUIDELINES:    Dosing Weight 77.3 kg - current wt   Estimated Energy Needs: 0027-9204 kcals (30-35 Kcal/Kg)  Justification: underweight  Estimated Protein Needs:  grams protein (1.2-1.5 g pro/Kg)  Justification: Repletion      MALNUTRITION:  % Weight Loss:  > 2% in 1 week (severe malnutrition)  % Intake:  </= 50% " for >/= 5 days (severe malnutrition)  Subcutaneous Fat Loss:  None observed  Muscle Loss:  None observed  Fluid Retention:  None noted    Malnutrition Diagnosis: Severe malnutrition  In Context of:  Acute illness or injury    NUTRITION DIAGNOSIS:  Inadequate oral intake related to altered GI function as evidenced by 5# (3%) wt loss x 1 week and report of minimal intake      NUTRITION INTERVENTIONS  Recommendations / Nutrition Prescription  Continue regular diet      Implementation  Nutrition education: Provided education on nutritional supplements  Medical Food Supplement - pt declines at this time      Nutrition Goals  Pt will consume at least 50% of meals.      MONITORING AND EVALUATION:  Progress towards goals will be monitored and evaluated per protocol and Practice Guidelines    Denisa Guidry RD  Pager [phone] (M-F)            [phone] (W/E & Hol)

## 2018-09-06 NOTE — PLAN OF CARE
Problem: Patient Care Overview  Goal: Plan of Care/Patient Progress Review  Outcome: No Change  Pt A&O VSS on RA. Voiding. ind in room denies pain. LS diminished. Ct abscess drainage later today along with Thoracentesis. Normal saline 75ml/hr

## 2018-09-07 ENCOUNTER — APPOINTMENT (OUTPATIENT)
Dept: GENERAL RADIOLOGY | Facility: CLINIC | Age: 53
End: 2018-09-07
Attending: THORACIC SURGERY (CARDIOTHORACIC VASCULAR SURGERY)
Payer: COMMERCIAL

## 2018-09-07 LAB
ANION GAP SERPL CALCULATED.3IONS-SCNC: 11 MMOL/L (ref 3–14)
BUN SERPL-MCNC: 11 MG/DL (ref 7–30)
CALCIUM SERPL-MCNC: 8.4 MG/DL (ref 8.5–10.1)
CHLORIDE SERPL-SCNC: 104 MMOL/L (ref 94–109)
CO2 SERPL-SCNC: 23 MMOL/L (ref 20–32)
CREAT SERPL-MCNC: 0.71 MG/DL (ref 0.66–1.25)
ERYTHROCYTE [DISTWIDTH] IN BLOOD BY AUTOMATED COUNT: 13.8 % (ref 10–15)
GFR SERPL CREATININE-BSD FRML MDRD: >90 ML/MIN/1.7M2
GLUCOSE SERPL-MCNC: 93 MG/DL (ref 70–99)
HCT VFR BLD AUTO: 35.8 % (ref 40–53)
HGB BLD-MCNC: 11.9 G/DL (ref 13.3–17.7)
MCH RBC QN AUTO: 32.4 PG (ref 26.5–33)
MCHC RBC AUTO-ENTMCNC: 33.2 G/DL (ref 31.5–36.5)
MCV RBC AUTO: 98 FL (ref 78–100)
PLATELET # BLD AUTO: 209 10E9/L (ref 150–450)
POTASSIUM SERPL-SCNC: 3.9 MMOL/L (ref 3.4–5.3)
RBC # BLD AUTO: 3.67 10E12/L (ref 4.4–5.9)
SODIUM SERPL-SCNC: 138 MMOL/L (ref 133–144)
WBC # BLD AUTO: 5.8 10E9/L (ref 4–11)

## 2018-09-07 PROCEDURE — 25000132 ZZH RX MED GY IP 250 OP 250 PS 637: Performed by: PHYSICIAN ASSISTANT

## 2018-09-07 PROCEDURE — 85027 COMPLETE CBC AUTOMATED: CPT | Performed by: PHYSICIAN ASSISTANT

## 2018-09-07 PROCEDURE — 36415 COLL VENOUS BLD VENIPUNCTURE: CPT | Performed by: PHYSICIAN ASSISTANT

## 2018-09-07 PROCEDURE — 99232 SBSQ HOSP IP/OBS MODERATE 35: CPT | Performed by: INTERNAL MEDICINE

## 2018-09-07 PROCEDURE — 25000128 H RX IP 250 OP 636: Performed by: INTERNAL MEDICINE

## 2018-09-07 PROCEDURE — 80048 BASIC METABOLIC PNL TOTAL CA: CPT | Performed by: PHYSICIAN ASSISTANT

## 2018-09-07 PROCEDURE — 40000986 XR CHEST PORT 1 VW

## 2018-09-07 PROCEDURE — 12000007 ZZH R&B INTERMEDIATE

## 2018-09-07 RX ADMIN — LEVETIRACETAM 1000 MG: 500 TABLET, FILM COATED ORAL at 08:18

## 2018-09-07 RX ADMIN — PIPERACILLIN SODIUM,TAZOBACTAM SODIUM 3.38 G: 3; .375 INJECTION, POWDER, FOR SOLUTION INTRAVENOUS at 05:06

## 2018-09-07 RX ADMIN — PIPERACILLIN SODIUM,TAZOBACTAM SODIUM 3.38 G: 3; .375 INJECTION, POWDER, FOR SOLUTION INTRAVENOUS at 17:41

## 2018-09-07 RX ADMIN — PIPERACILLIN SODIUM,TAZOBACTAM SODIUM 3.38 G: 3; .375 INJECTION, POWDER, FOR SOLUTION INTRAVENOUS at 23:50

## 2018-09-07 RX ADMIN — METRONIDAZOLE: 7.5 GEL TOPICAL at 08:18

## 2018-09-07 RX ADMIN — PIPERACILLIN SODIUM,TAZOBACTAM SODIUM 3.38 G: 3; .375 INJECTION, POWDER, FOR SOLUTION INTRAVENOUS at 12:24

## 2018-09-07 ASSESSMENT — ACTIVITIES OF DAILY LIVING (ADL)
ADLS_ACUITY_SCORE: 11

## 2018-09-07 NOTE — PLAN OF CARE
Problem: Patient Care Overview  Goal: Plan of Care/Patient Progress Review  Outcome: No Change  Pt A&O. VSS. Tele NSR. Right lower lung sounds crackles. Chest tube on right side to suction. No air leak, no crepitus. Pt on bedrest with bathroom privileges. Chest tube is putting out yellow drainage and was flushed. Up with standby.

## 2018-09-07 NOTE — PROGRESS NOTES
[facility]    Infectious Disease Progress Note    Date of Service (when I saw the patient): 09/07/2018     Assessment & Plan   Jorge L Townsend is a 52 year old male who was admitted on 9/5/2018.     Impression:  1. 52 y.o male with h/o spontaneous pneumothorax in 8/2018. Management was conservative with chest tube placement, complicated with ongoing air leak. Finally chest tube removed August 27.   2. Admitted this occasion with history of yellow, foul smelling discharge from the chest tube late August, and increasing systemic symptoms since the removal of the chest tube.   3. S/P chest tube placement by IR. Cultures from the fluid sent and pending.      Recommendations:   Would recommend zosyn for now.   Follow up on  The cultures from the fluid      Yoly Munson MD    Interval History   t max of 99.1   Feels fatigued but overall improved since CT placement     Physical Exam   Temp: 99  F (37.2  C) Temp src: Oral BP: 120/60 Pulse: 72   Resp: 16 SpO2: 94 % O2 Device: None (Room air)    Vitals:    09/05/18 1617   Weight: 77.3 kg (170 lb 6.4 oz)     Vital Signs with Ranges  Temp:  [98.6  F (37  C)-99.1  F (37.3  C)] 99  F (37.2  C)  Pulse:  [] 72  Resp:  [14-20] 16  BP: ()/(60-93) 120/60  SpO2:  [92 %-97 %] 94 %    Constitutional: tiered   Lungs: Clear to auscultation bilaterally, no crackles or wheezing  Cardiovascular: Regular rate and rhythm, normal S1 and S2, and no murmur noted  Abdomen: Normal bowel sounds, soft, non-distended, non-tender  Skin: No rashes, no cyanosis, no edema  Other:    Medications       levETIRAcetam  1,000 mg Oral Daily     metroNIDAZOLE   Topical QAM     piperacillin-tazobactam  3.375 g Intravenous Q6H     sodium chloride (PF)  3 mL Intracatheter Q8H       Data   All microbiology laboratory data reviewed.  Recent Labs   Lab Test  09/07/18   0735  09/06/18   0734  09/05/18 2030   WBC  5.8  6.9  7.8   HGB  11.9*  12.8*  13.0*   HCT  35.8*  38.5*  38.1*   MCV  98   98  97   PLT  209  207  188     Recent Labs   Lab Test  09/07/18   0735  09/06/18   0734  09/05/18 2030   CR  0.71  0.71  0.69     No lab results found.  Recent Labs   Lab Test  09/06/18   1605  09/05/18 2030   CULT  Canceled, Test credited  Duplicate request    No growth after 2 days  No growth after 2 days

## 2018-09-07 NOTE — PLAN OF CARE
Problem: Patient Care Overview  Goal: Plan of Care/Patient Progress Review  Outcome: Improving  patient is alert and oriented X4. Vital signs within defined limits, tele normal sinus rhythm. No complaints of pain. Chest tube intact and patent, -20 suction.  Moderate serous yellow output. No air leak noted at this time. Lung sounds diminished with crackles in right lower lung lobe. IS to 1500. Bowel sounds active, passing flatus, tolerating regular diet. Bedrest with bathroom privileges at this time. Stand by assistance with mobility. Pulses +2, CMS intact.

## 2018-09-07 NOTE — PROGRESS NOTES
"Thoracic Surgery:  /60 (BP Location: Right arm)  Pulse 72  Temp 99  F (37.2  C) (Oral)  Resp 16  Ht 1.981 m (6' 6\")  Wt 77.3 kg (170 lb 6.4 oz)  SpO2 94%  BMI 19.69 kg/m2  CXR: much improved right hydropneumothorax with drain in good position at right lung base, small apical right PTX  CT: serous drainage, no air leak  Gram stain right pleural fluid: no WBCs, no organisms  S: Feels okay- only complains of discomfort at CT site. No SOB, chills, sweats. States he has infrequent cough that is somewhat productive but that he doesn't spit the phlegm out. Using IS to 2500.  O: CT: no kinks, well-secured to right chest, serous output, no air leak  P: Zosyn as per ID  Fluid culture: pending  Keep CT to suction-- ok to ambulate QID on water seal    Drea Vilchis PA-C with Dr. Lamont Osorio  [facility]  Cell [phone]        "

## 2018-09-07 NOTE — PROGRESS NOTES
"[facility]    Hospitalist Progress Note    Interval History   - Doing well overnight, having chest discomfort but denies pain, SOB, cough, good appetite. Hasn't been able to ambulate due to chest tube    Assessment & Plan   Summary: Jorge L Townsend is a 51 yo M with a PMH of spontaneous pneumothoraces, most recently right PTX on 08/2018, managed conservatively with chest tube, hx left thoracotomy, admitted 8/5/18 for large R pleural effusion concerning for developing empyema. Thoracic surgery consulted and s/p chest tube 9/6.    Suspected right lung empyema s/p chest tube 9/6  History of recent spontaneous right pneumothorax in 08/2018  The patient did have a persistent air leak on his previous hospitalization. He had a Pneumostat placed prior to discharge, with noted thick, yellow malodorous drainage. CT chest on 8/22/18 revealed small to moderate pneumothorax of the right lung base. Patient endorses feeling worse prior to admission. Blood cultures NGTD. Labs unremarkable, however chest CT with contrast 9/6/18 noted Large Rt sided hydropneumothorax, with portions of pleural fluid appear loculated superiorly. Chest tube placed 9/6/18 by thoracic surgery. Repeat CXR 9/7 with significantly improved hydropneumothorax with \"Persistent loculated right apical pneumothorax.\"  - CT surgery following, appreciate involvement   - Continue chest tube  - ID consulted   - Zosyn pending pleural fluid cultures  - Stop IVF continue diet      History of alcohol dependence: The patient reports drinking 2-3 hard liquor beverages daily. No significant withdrawal noted this admission.      Possible seizure disorder: Admitted following possible seizure at work in Jan 2016, possible alcohol withdrawal. Will continue with Keppra 1000 mg daily (should be BID but he takes it daily only)  - F/u with neurology    Severe malnutrition: Related to patient's recent chronic illness, and possibly due to chronic alcohol use.  - Nutrition " consulted     DVT Prophylaxis: Pneumatic Compression Devices  Code Status: Full Code  PT/OT: Not needed    Disposition: Expected discharge in around 1-2 days pending ID, thoracic surgery    Daniel Ruiz MD  Text Page  (7am to 6pm)  -Data reviewed today: I reviewed all new labs and imaging results over the last 24 hours.    Physical Exam   Temp: 99  F (37.2  C) Temp src: Oral BP: 120/60 Pulse: 72   Resp: 16 SpO2: 94 % O2 Device: None (Room air)    Vitals:    09/05/18 1617   Weight: 77.3 kg (170 lb 6.4 oz)     Vital Signs with Ranges  Temp:  [98.6  F (37  C)-99.1  F (37.3  C)] 99  F (37.2  C)  Pulse:  [] 72  Resp:  [14-20] 16  BP: ()/(60-93) 120/60  SpO2:  [92 %-97 %] 94 %  I/O last 3 completed shifts:  In: 2476 [P.O.:730; I.V.:1726; Other:20]  Out: 2638 [Urine:875; Chest Tube:1763]  O2 requirements: None    Constitutional: Male in NAD  HEENT: Eyes nonicteric, oral mucosa moist  Cardiovascular: RRR, normal S1/2, no m/r/g  Respiratory: Decrease right sided breath sounds throughout right lung without clear crackles, right anterior chest tube in place  Vascular: No LE pitting edema, noted distal pedal pulses  GI: Normoactive bowel sounds, nontender, nondistended  Skin/Integumen: No rashes  Neuro/Psych: Flat affect, A&Ox3, moves all extremities    Medications     sodium chloride 75 mL/hr at 09/06/18 0456       levETIRAcetam  1,000 mg Oral Daily     metroNIDAZOLE   Topical QAM     piperacillin-tazobactam  3.375 g Intravenous Q6H     sodium chloride (PF)  3 mL Intracatheter Q8H       Data     Recent Labs  Lab 09/07/18  0735 09/06/18  0832 09/06/18  0734 09/05/18  2030   WBC 5.8  --  6.9 7.8   HGB 11.9*  --  12.8* 13.0*   MCV 98  --  98 97     --  207 188   INR  --  1.05  --   --      --  136 135   POTASSIUM 3.9  --  4.3 4.0   CHLORIDE 104  --  100 96   CO2 23  --  27 29   BUN 11  --  12 13   CR 0.71  --  0.71 0.69   ANIONGAP 11  --  9 10   MIKE 8.4*  --  8.9 9.0   GLC 93  --  99 103*    PROTTOTAL  --   --  7.2  --        Imaging:   Recent Results (from the past 24 hour(s))   CT Chest w Contrast    Narrative    CT CHEST WITH CONTRAST September 6, 2018 10:42 AM     HISTORY: Empyema.    TECHNIQUE: 80mL Isovue-370. Radiation dose for this scan was reduced  using automated exposure control, adjustment of the mA and/or kV  according to patient size, or iterative reconstruction technique.    COMPARISON: 8/14/2018.    FINDINGS: There is a large right hydropneumothorax. Portions of the  pleural fluid appear loculated superiorly. No evidence of tension. No  pericardial or left pleural effusion. The lungs are emphysematous. No  discrete pulmonary nodule or mass demonstrated. Prominent mediastinal  lymph nodes are unchanged. No hilar or axillary adenopathy. Images  through the upper abdomen demonstrate fatty infiltration of the liver.      Impression    IMPRESSION: Large right-sided hydropneumothorax. Please see separate  report describing CT-guided chest tube placement.    CLIVE JALLOH MD   CT Chest Tube with Cath Placement    Narrative    CT GUIDED CHEST TUBE PLACEMENT 9/6/2018 11:19 AM     HISTORY: Right hydropneumothorax.     COMPARISON: None.     TECHNIQUE: Volumetric helical acquisition of CT images without  contrast. Radiation dose for this scan was reduced using automated  exposure control, adjustment of the mA and/or kV according to patient  size, or iterative reconstruction technique.    MEDICATIONS:  4 mL Lidocaine 1% locally, ANGELA Boone, 10 minutes  sedation time, 1mg Versed, 50 mcg Fentanyl.    TECHNIQUE: Informed consent was obtained from the patient. A timeout  was performed. Patient received conscious sedation with Versed and  fentanyl with constant monitoring by the physician and nursing staff.  Total sedation time was 10 minutes. The patient was scanned in supine  position for procedural planning. The skin was prepped and draped in a  sterile manner, and anesthetized with 1%  lidocaine. The pleural space  was accessed using trocar technique and a 12 Divehi locking pigtail  catheter was advanced into the pleural space and its position verified  with imaging. The catheter was secured to the skin using an adhesive  bandage. There were no immediate complications and patient left the CT  suite in satisfactory condition.     FINDINGS: Limited CT images redemonstrate a large right  hydropneumothorax. Subsequent images demonstrate CT-guided chest tube  placement.      Impression    IMPRESSION: Successful CT guided right pigtail chest tube placement.    CLIVE JALLOH MD   XR Chest Port 1 View    Narrative    CHEST ONE VIEW PORTABLE   9/7/2018 5:16 AM     HISTORY:  Chest tube placement.     COMPARISON: Chest CT 9/6/2018.       Impression    IMPRESSION:   1. Right basilar chest tube is in place. Right-sided hydropneumothorax  is markedly decreased in size compared to previous chest CT.  Persistent loculated right apical pneumothorax.  2. Surgical staple lines at the lung apices are again seen. Patchy  opacities at the right lung base could represent scarring or  atelectasis, although infiltrate would be difficult to exclude. Lungs  remain hyperinflated. Cardiac silhouette is unchanged.

## 2018-09-07 NOTE — PLAN OF CARE
Problem: Patient Care Overview  Goal: Plan of Care/Patient Progress Review  Outcome: Improving  A&OX4, VSS on RA. LS with RLL crackles. Denies SOB and chest pain, just some discomfort at the CT site. Chest tube with no air leaks. Still putting out yellow drainage with occasional mucous. Flushed with 10 ml NS. Up independently. On regular diet. IV saline locked. On IV abx. ID following. Tele was SD.Continue to monitor.

## 2018-09-08 ENCOUNTER — APPOINTMENT (OUTPATIENT)
Dept: GENERAL RADIOLOGY | Facility: CLINIC | Age: 53
End: 2018-09-08
Attending: THORACIC SURGERY (CARDIOTHORACIC VASCULAR SURGERY)
Payer: COMMERCIAL

## 2018-09-08 PROCEDURE — 71045 X-RAY EXAM CHEST 1 VIEW: CPT

## 2018-09-08 PROCEDURE — 25000128 H RX IP 250 OP 636: Performed by: INTERNAL MEDICINE

## 2018-09-08 PROCEDURE — 25000132 ZZH RX MED GY IP 250 OP 250 PS 637: Performed by: PHYSICIAN ASSISTANT

## 2018-09-08 PROCEDURE — 99232 SBSQ HOSP IP/OBS MODERATE 35: CPT | Performed by: INTERNAL MEDICINE

## 2018-09-08 PROCEDURE — 12000007 ZZH R&B INTERMEDIATE

## 2018-09-08 RX ADMIN — PIPERACILLIN SODIUM,TAZOBACTAM SODIUM 3.38 G: 3; .375 INJECTION, POWDER, FOR SOLUTION INTRAVENOUS at 13:03

## 2018-09-08 RX ADMIN — METRONIDAZOLE: 7.5 GEL TOPICAL at 08:49

## 2018-09-08 RX ADMIN — PIPERACILLIN SODIUM,TAZOBACTAM SODIUM 3.38 G: 3; .375 INJECTION, POWDER, FOR SOLUTION INTRAVENOUS at 23:57

## 2018-09-08 RX ADMIN — LEVETIRACETAM 1000 MG: 500 TABLET, FILM COATED ORAL at 08:49

## 2018-09-08 RX ADMIN — PIPERACILLIN SODIUM,TAZOBACTAM SODIUM 3.38 G: 3; .375 INJECTION, POWDER, FOR SOLUTION INTRAVENOUS at 06:58

## 2018-09-08 RX ADMIN — PIPERACILLIN SODIUM,TAZOBACTAM SODIUM 3.38 G: 3; .375 INJECTION, POWDER, FOR SOLUTION INTRAVENOUS at 18:49

## 2018-09-08 ASSESSMENT — ACTIVITIES OF DAILY LIVING (ADL)
ADLS_ACUITY_SCORE: 11

## 2018-09-08 NOTE — PROGRESS NOTES
Patient: Rebekah Peters Date: 2017   : 1942 Attending: Calvin Zaldivar MD   75 year old female      GI CONSULT    Consult Diagnosis:  GIB    HPI: 76 YO female with h/o CVA, recent MI status post of 2 drug-eluting stent placements to the mid LAD 2016 on ASA and Plavix, diabetes, GERD, and AVM in jejunum s/p APC who was recently admitted from - for GIB with a hgb at that time of 5.9. During this admission patient underwent enteroscopy  revealing small AVM proximal jejunum S/P APC, barretts esophagus. She was transfused PRBCs and d/c with a hgb of 9.1. The patient has had several hospital admissions for anemia and chest pains. She presented to  ED on 1/10 with c/o intermittent CP with activity as well as constipation. IN ED found to have heme positive dark brown stool. Labs reveal Hgb 6.9 s/p transfusion now 8.6. Other labs reveal Hct 30.1, plts 366, INR 1.0, BCR 15, creat 1.10. Troponin 0.04, 6.30, 12.40. An Echo has been ordered. Patient reports last BM was 3 days ago and was non bloody. A KUB was obtained noting nonobstructive bowel gas pattern with moderate stool. Patient was started on Miralax and had 2 dark brown BM this am. Denies any n/v, reflux, or dysphagia. Denies dizziness, HA. No abdominal pain. Reports decreased appetite without weight loss.     Enteroscopy 2016 noting small AVM in proximal jejunum. NO active bleed. S/p APC. Lr's type mucosa in distal esophagus, not bx. No evidence of active/ recent bleed.  Enteroscopy 2016 no evidence of GIB. Small HH, 5 cm segment of likely Lr's esophagus, not bx.  Colonoscopy 2016 fair prep diverticulosis in descending and sigmoid colon.  EGD 2016 HH, gastritis, duodenitis. NO active bleeding.      Past Medical History:    Disorder of bone and cartilage, unspecified     2011    CVA (cerebral infarction)                       2005      Comment: no residual effect    Osteoporosis with fracture              THORACIC SURGERY    Doing well  Afebrile  Minimal CT output  No air leak  CXR r lung expanded, pleural space well drained    So far cultures of pleural fluid are negative    Possibly D/C CT in am tomorrow  Antibiotics as per ID    PHOEBE MEDINA MD [facility] ONCOLOGY THORACIC SURGERY  CELL:  [phone]  OFFICE: [phone]              01/01/2008      Comment: left femur fracture    Left wrist fracture                             01/01/2010    Carpal tunnel syndrome                                        GERD (gastroesophageal reflux disease)                        Hypercholesterolemia                                          Anterior ischemic optic neuropathy, left eye    11/12/2012    Posterior vitreous detachment, both eyes        11/12/2012    Dermatochalasis                                 11/12/2012    Tubular adenoma of colon                        10/14         NIDDM (non-insulin dependent diabetes mellitus) 01/01/2005      Comment: checks blood sugar 2-3 x daily    Background diabetic retinopathy(362.01)         3/5/2015      Arthritis                                                       Comment: hands, spine    Urinary incontinence                                          Hemorrhoids                                                   Essential (primary) hypertension                              Hospitalization within last 30 days                             Comment: had blood transfusion    Past Surgical History   Procedure Laterality Date   • Dexa bone density axial skeleton  01/05/2011   • Esophagogastroduodenoscopy transoral flex w/bx single or mult  03/02/2009     EGD with Bx   • Colonoscopy diagnostic  08/31/2005     Colonoscopy, Dx   • I&d of vulva/perineum abscess  12/14/2004   • Colonoscopy w/ polypectomy  06/11/2003   • Removal gallbladder  1964     cholecystectomy   • Back surgery  1978     spinal fusion   • Eye surgery  1/17/12 & 2/7/12     bilateral cataract surgery   • Carpal tunnel release Bilateral 1990's?   • Vaginal delivery  x1   • Lipoma resection  4/2015     excision lipoma left shoulder area   • Coronary stent placement  7/8/16     LAD placement   • Cardiac catheterization         Family History   Problem Relation Age of Onset   • Cataracts Sister    • Diabetes Sister    • Cancer Sister    • Diabetes Mother     • Cancer Mother    • Cancer Father 47     unsure of type   • Diabetes Brother    • Cancer Brother 58   • Diabetes Son    • Cancer Sister      spinal cancer   • Diabetes Sister    • Cancer Sister    • Diabetes Other    • Diabetes Sister        ALLERGIES:   Allergen Reactions   • Codeine Nausea & Vomiting   • Penicillins HIVES       Social History:  Social History   Substance Use Topics   • Smoking status: Former Smoker     Packs/day: 1.00     Years: 40.00     Types: Cigarettes     Start date: 1958     Quit date: 11/1/2009   • Smokeless tobacco: Never Used   • Alcohol use No       Scheduled Inpatient Meds  • metoPROLOL  5 mg Intravenous 4 times per day   • insulin lispro   Subcutaneous Q6H   • gabapentin  300 mg Oral BID   • aspirin  81 mg Oral Daily   • clopidogrel  75 mg Oral Daily   • lisinopril  20 mg Oral Daily   • insulin glargine  10 Units Subcutaneous Nightly   • simvastatin  40 mg Oral Daily   • polyethylene glycol  17 g Oral Daily   • sodium chloride (PF)  2 mL Injection 2 times per day     Prescriptions Prior to Admission   Medication Sig Dispense Refill   • gabapentin (NEURONTIN) 300 MG capsule Take 1 capsule by mouth 2 times daily. 180 capsule 1   • zolpidem (AMBIEN) 5 MG tablet Take 1 tablet by mouth nightly as needed for Sleep. 30 tablet 0   • pantoprazole (PROTONIX) 40 MG tablet Take 1 tablet by mouth nightly. 90 tablet 1   • aspirin 81 MG tablet Take 1 tablet by mouth daily.     • clopidogrel (PLAVIX) 75 MG tablet Take 1 tablet by mouth daily.     • glimepiride (AMARYL) 4 MG tablet Take 4 mg by mouth daily (before breakfast).     • lisinopril (ZESTRIL) 20 MG tablet Take 1 tablet by mouth daily. 30 tablet 2   • insulin glargine (LANTUS SOLOSTAR) 100 UNIT/ML injection Inject 20 Units into the skin nightly. (Patient taking differently: Inject 14 Units into the skin nightly. ) 15 mL 3   • insulin lispro protamine-insulin lispro (HUMALOG MIX 50/50 KWIKPEN) (50-50) 100 UNIT/ML pen-injector Use 15 units in  the morning, and 5 units before dinner as directed. (Patient taking differently: Use 15 units before breakfast, and 5 units before lunch.) 15 mL 3   • hydrochlorothiazide (HYDRODIURIL) 12.5 MG tablet Take 1 tablet by mouth daily. 90 tablet 1   • simvastatin (ZOCOR) 40 MG tablet Take 1 tablet by mouth daily. 90 tablet 1   • ketorolac (ACULAR) 0.5 % ophthalmic solution Place 1 drop into both eyes as needed.      • ferrous sulfate 325 (65 FE) MG tablet Take 1 tablet by mouth daily (with breakfast). 30 tablet 2   • Cholecalciferol (VITAMIN D3) 2000 UNITS Tab Take 1 tablet by mouth daily. 30 tablet 4        Review of Systems:  General-  Denies fevers chills, weight loss, fatigue  HEENT-  Denies headache, vision changes, rhinorrhea, sore throat  Respiratory- reports mild SOB  Cardiac- Denies CP currently- reports pain with activity  GI- as per HPI  Genitourinary-  Denies dysuria, hematuria  Skin- dry itchy skin  Psych-  Denies depression or anxiety  Neuro-  Denies dizziness, focal weakness, paresthesias  Hematologic- hx anemia  Musculoskeletal- Denies myalgias, arthralgias    Physical Exam:   Vitals:    Visit Vitals   • /61   • Pulse 75   • Temp 97.3 °F (36.3 °C) (Oral)   • Resp 30   • Ht 5' 4\" (1.626 m)   • Wt 76 kg   • SpO2 97%   • BMI 28.76 kg/m2      General appearance: alert, appears stated age and cooperative, NAD  HEENT: Normocephalic, atraumatic. No scleral icterus. Nares patent. Oral mucosa moist.   Neck: no adenopathy and supple, symmetrical, trachea midline  Lungs: diminished  Heart: irregular rate and rhythm, S1, S2 normal, + murmur    Abdomen: Soft, non-tender, no guarding, BS +, non-distended, no masses or organomegaly  Rectal:  Deferred  Genitourinary:  Deferred  Extremities: extremities normal, atraumatic, no cyanosis or edema  Skin:  Warm and dry without rash  Neurologic: Grossly normal, no focal deficits     Labs:    Recent Labs  Lab 01/11/17  0445 01/10/17  1808   WBC 6.1 6.1   HCT 30.1* 24.2*    HGB 8.6* 6.9*   MCV 86.5 88.0    366   INR  --  1.0   SODIUM 140 137   POTASSIUM 4.2 3.9   GLUCOSE 196* 360*   BUN 16 19   CREATININE 1.10* 1.22*   AST 56* 11   GPT 18 14   ALKPT 60 56   BILIRUBIN 0.3 0.2   ALBUMIN 3.4* 3.3*             Radiology Findings:  KUB 1/11  IMPRESSION:  1. Nonobstructive bowel gas pattern with moderate stool.       Pathology Findings:  n/a    Assessment:  1. 75 year old female with acute on chronic normocytic anemia, hemoccult positive stool concerning for GI blood loss. S/p multiple endoscopic evaluations, most recently enteroscopy 12/7 noting small AVM in proximal jejunum. No active bleed. S/p APC. Lr's type mucosa in distal esophagus, not bx. No evidence of active/ recent bleed.   2. CAD s/p stents 7/2016; now with elevated troponin. No acute EKG   changes   --Echo pending  3. Afib, not on anticoagulation  4. Constipation, with KUB noting moderate stool  5. HTN       Plans/Recommendations:  --monitor H&H, stools - transfuse prn  --miralax QD  --on ASA/Plavix  --ppi gtt   --further recommendations pending cardiac clearance.   --supportive cares        Thank You,   KRISTIN Sorensen with Dr. Martin    GI Staff  Patient seen and examined, agree with the PA's assessment and plan, formulated by me.    --Patient with a history of obscure GI bleeding, likely from small bowel AVM's, admitted with worsening anemia, no evidence of overt bleeding, evidence of ACS with significant troponin elevation  --Continue anti-platelet agents, given recent placement of a drug-eluting coronary stent  --Continue PPI, will change to IV BID, monitor H/H  --No plans for endoscopy at this time due to ACS    Thank you, Dr. Usama Gutierrez MD for allowing me to participate in the care of this patient

## 2018-09-08 NOTE — PLAN OF CARE
Problem: Patient Care Overview  Goal: Plan of Care/Patient Progress Review  Outcome: Improving  VSS, no pain. 10ml in chest tube drain as of 2130. A + O x 4. Neuros intact. Ambulates independently. BM at 2130. Passing gas. Eating and voiding well. NSR.

## 2018-09-08 NOTE — PROGRESS NOTES
"[facility]    Hospitalist Progress Note    Interval History   - Chest tube output minimal, ~100mL, over 24hrs. Likely to be removed tomorrow. Pleural fluid cultures negative. Right lung sounds improved    Assessment & Plan   Summary: Jorge L Townsend is a 53 yo M with a PMH of spontaneous pneumothoraces, most recently right PTX on 08/2018, managed conservatively with chest tube, hx left thoracotomy, admitted 8/5/18 for large R pleural effusion concerning for developing empyema. Thoracic surgery consulted and s/p chest tube 9/6.    Suspected right lung empyema s/p chest tube 9/6  History of recent spontaneous right pneumothorax in 08/2018  The patient did have a persistent air leak on his previous hospitalization. He had a Pneumostat placed prior to discharge, with noted thick, yellow malodorous drainage. CT chest on 8/22/18 revealed small to moderate pneumothorax of the right lung base. Patient endorses feeling worse prior to admission. Blood cultures NGTD. Labs unremarkable, however chest CT with contrast 9/6/18 noted Large Rt sided hydropneumothorax, with portions of pleural fluid appear loculated superiorly. Chest tube placed 9/6/18 by thoracic surgery. Repeat CXR 9/7 with significantly improved hydropneumothorax with \"Persistent loculated right apical pneumothorax.\" Pleural fluid cultures negative 9/8.  - CT surgery following, appreciate involvement   - Continue chest tube   - Bedrest with bathroom privileges  - ID consulted   - Zosyn      History of alcohol dependence: The patient reports drinking 2-3 hard liquor beverages daily. No significant withdrawal noted this admission.      Possible seizure disorder: Admitted following possible seizure at work in Jan 2016, possible alcohol withdrawal. Will continue with Keppra 1000 mg daily (should be BID but he takes it daily only)  - Discussed w/ patient, will f/u with neurology as outpatient    Severe malnutrition: Related to patient's recent chronic " illness, >10lb weight loss.  - Nutrition consulted     DVT Prophylaxis: Pneumatic Compression Devices  Code Status: Full Code  PT/OT: Not needed    Disposition: Expected discharge in around 1-2 days pending ID, thoracic surgery    Daniel Ruiz MD  Text Page  (7am to 6pm)  -Data reviewed today: I reviewed all new labs and imaging results over the last 24 hours.    Physical Exam   Temp: 98.6  F (37  C) Temp src: Oral BP: 109/73 Pulse: 83   Resp: 16 SpO2: 96 % O2 Device: None (Room air)    Vitals:    09/05/18 1617   Weight: 77.3 kg (170 lb 6.4 oz)     Vital Signs with Ranges  Temp:  [98.4  F (36.9  C)-99.4  F (37.4  C)] 98.6  F (37  C)  Pulse:  [73-93] 83  Resp:  [16] 16  BP: (109-126)/(73-91) 109/73  SpO2:  [94 %-96 %] 96 %  I/O last 3 completed shifts:  In: 550 [P.O.:540; Other:10]  Out: 1577 [Urine:1470; Chest Tube:107]  O2 requirements: None    Constitutional: Male in NAD  HEENT: Eyes nonicteric, oral mucosa moist  Cardiovascular: RRR, normal S1/2, no m/r/g  Respiratory: CTAB with equal right and left breath sounds. Right anterior chest tube in place  Vascular: No LE pitting edema, noted distal pedal pulses  GI: Normoactive bowel sounds, nontender, nondistended  Skin/Integumen: No rashes  Neuro/Psych: Appropriate mood and affect, A&Ox3, moves all extremities    Medications       levETIRAcetam  1,000 mg Oral Daily     metroNIDAZOLE   Topical QAM     piperacillin-tazobactam  3.375 g Intravenous Q6H     sodium chloride (PF)  3 mL Intracatheter Q8H       Data     Recent Labs  Lab 09/07/18  0735 09/06/18  0832 09/06/18  0734 09/05/18  2030   WBC 5.8  --  6.9 7.8   HGB 11.9*  --  12.8* 13.0*   MCV 98  --  98 97     --  207 188   INR  --  1.05  --   --      --  136 135   POTASSIUM 3.9  --  4.3 4.0   CHLORIDE 104  --  100 96   CO2 23  --  27 29   BUN 11  --  12 13   CR 0.71  --  0.71 0.69   ANIONGAP 11  --  9 10   MIKE 8.4*  --  8.9 9.0   GLC 93  --  99 103*   PROTTOTAL  --   --  7.2  --        Imaging:    Recent Results (from the past 24 hour(s))   XR Chest Port 1 View    Narrative    XR CHEST PORT ONE VIEW  9/8/2018 5:56 AM     HISTORY: Chest tube placement.     COMPARISON: 9/7/2018.      Impression    IMPRESSION: Right basilar chest tube remains in place. Small right  apical pneumothorax is again seen and not significantly changed. No  definite left pneumothorax. Surgical staple lines at the lung apices  are again noted. Streaky right basilar opacity is similar to prior. No  definite new infiltrate.    LOLY NORRIS MD

## 2018-09-08 NOTE — PLAN OF CARE
Problem: Patient Care Overview  Goal: Plan of Care/Patient Progress Review  Oriented. Up independently. Tylenol for discomfort. Appetite. Good. Lungs clear. Sats good on room air. CT draining serosanguinous fluid. Voiding. VSS.

## 2018-09-08 NOTE — PLAN OF CARE
Problem: Patient Care Overview  Goal: Plan of Care/Patient Progress Review  Outcome: No Change  Vital signs stable. Alert and oriented. Lung sounds clear. Bowel sounds active and audible, flatus present. Chest tube dressing clean dry and intact. Patient denies pain. Up independently in room. Tolerating regular diet. CMS intact, CIWA screen negative.

## 2018-09-09 ENCOUNTER — APPOINTMENT (OUTPATIENT)
Dept: GENERAL RADIOLOGY | Facility: CLINIC | Age: 53
End: 2018-09-09
Attending: THORACIC SURGERY (CARDIOTHORACIC VASCULAR SURGERY)
Payer: COMMERCIAL

## 2018-09-09 VITALS
DIASTOLIC BLOOD PRESSURE: 73 MMHG | BODY MASS INDEX: 19.71 KG/M2 | OXYGEN SATURATION: 94 % | HEART RATE: 81 BPM | HEIGHT: 78 IN | RESPIRATION RATE: 16 BRPM | TEMPERATURE: 98.8 F | WEIGHT: 170.4 LBS | SYSTOLIC BLOOD PRESSURE: 107 MMHG

## 2018-09-09 PROCEDURE — 99239 HOSP IP/OBS DSCHRG MGMT >30: CPT | Performed by: INTERNAL MEDICINE

## 2018-09-09 PROCEDURE — 71045 X-RAY EXAM CHEST 1 VIEW: CPT

## 2018-09-09 PROCEDURE — 25000128 H RX IP 250 OP 636: Performed by: INTERNAL MEDICINE

## 2018-09-09 PROCEDURE — 25000132 ZZH RX MED GY IP 250 OP 250 PS 637: Performed by: PHYSICIAN ASSISTANT

## 2018-09-09 RX ADMIN — PIPERACILLIN SODIUM,TAZOBACTAM SODIUM 3.38 G: 3; .375 INJECTION, POWDER, FOR SOLUTION INTRAVENOUS at 06:10

## 2018-09-09 RX ADMIN — PIPERACILLIN SODIUM,TAZOBACTAM SODIUM 3.38 G: 3; .375 INJECTION, POWDER, FOR SOLUTION INTRAVENOUS at 12:08

## 2018-09-09 RX ADMIN — OXYCODONE HYDROCHLORIDE 10 MG: 5 TABLET ORAL at 12:47

## 2018-09-09 RX ADMIN — METRONIDAZOLE: 7.5 GEL TOPICAL at 08:03

## 2018-09-09 RX ADMIN — LEVETIRACETAM 1000 MG: 500 TABLET, FILM COATED ORAL at 08:02

## 2018-09-09 ASSESSMENT — ACTIVITIES OF DAILY LIVING (ADL)
ADLS_ACUITY_SCORE: 11

## 2018-09-09 NOTE — PLAN OF CARE
Problem: Patient Care Overview  Goal: Plan of Care/Patient Progress Review  Outcome: Improving  Denies pain, minimal CT output. IV abx continue. Hoping to have CT pulled today and then home. Up independently, voids per urinal at night

## 2018-09-09 NOTE — DISCHARGE SUMMARY
"[facility]    Discharge Summary  Hospitalist    Date of Admission:  9/5/2018  Date of Discharge:  9/9/2018  Discharging Provider: Daniel Ruiz MD    Discharge Diagnoses   Right lung empyema, s/p chest tube 9/6-9/9  History of alcohol dependence  Possible seizure disorder  Severe malnutrition    History of Present Illness   Jorge L Townsend is a 51 yo M with a PMH of spontaneous pneumothoraces, most recently right PTX on 08/2018, managed conservatively with chest tube, hx left thoracotomy, admitted 8/5/18 for large R pleural effusion concerning for developing empyema. The patient did have a persistent air leak on his previous hospitalization. He had a Pneumostat placed prior to discharge, with noted thick, yellow malodorous drainage. CT chest on 8/22/18 revealed small to moderate pneumothorax of the right lung base.   Patient endorses feeling worse prior to admission. Blood cultures NGTD. Labs unremarkable, however chest CT with contrast 9/6/18 noted Large Rt sided hydropneumothorax, with portions of pleural fluid appear loculated superiorly. Chest tube placed 9/6/18 by thoracic surgery. Repeat CXR 9/7 with significantly improved hydropneumothorax with \"Persistent loculated right apical pneumothorax.\" Pleural fluid cultures negative 9/8. Patient was treated with Zosyn during this admission and at discharge ID recommended Augmentin pending final pleural fluid cultures.      History of alcohol dependence: The patient reports drinking 2-3 hard liquor beverages daily. No significant withdrawal noted this admission. Recommended cutting down on alcohol however patient appears pre contemplative about decreasing alcohol use.      Possible seizure disorder: Admitted following possible seizure at work in Jan 2016, possible alcohol withdrawal. Patient was prescribed Keppra 1000mg BID then however has been taking it 1000mg daily. He is not interested in taking it BID.  - Will continue with Keppra 1000 mg " daily, recommended patient to f/u with neurology as outpatient     Severe malnutrition: Related to patient's recent chronic illness, >10lb weight loss. F/u with PCP    Hospital Course   Jorge L Townsend was admitted on 9/5/2018.  The following problems were addressed during his hospitalization:    Active Problems:    Empyema lung (H)      Daniel Ruiz MD    Significant Results and Procedures   Chest tube 9/6-9/9/2018    Pending Results   These results will be followed up by PCP  Unresulted Labs Ordered in the Past 30 Days of this Admission     Date and Time Order Name Status Description    9/6/2018 1131 Fluid Culture Aerobic Bacterial Preliminary     9/5/2018 1932 Blood culture Preliminary     9/5/2018 1932 Blood culture Preliminary           Code Status   Full Code       Primary Care Physician   Oskar Arthur    Physical Exam   Temp: 98.8  F (37.1  C) Temp src: Oral BP: 107/73 Pulse: 81   Resp: 16 SpO2: 94 % O2 Device: None (Room air)    Vitals:    09/05/18 1617   Weight: 77.3 kg (170 lb 6.4 oz)     Vital Signs with Ranges  Temp:  [98.6  F (37  C)-99.3  F (37.4  C)] 98.8  F (37.1  C)  Pulse:  [73-81] 81  Resp:  [16-18] 16  BP: ()/(63-75) 107/73  SpO2:  [94 %-98 %] 94 %  I/O last 3 completed shifts:  In: 850 [P.O.:850]  Out: 1720 [Urine:1700; Chest Tube:20]    Constitutional: Male in NAD  HEENT: Eyes nonicteric, oral mucosa moist  Cardiovascular: RRR, normal S1/2, no m/r/g  Respiratory: CTAB with equal right and left breath sounds  Vascular: No LE pitting edema, noted distal pedal pulses  GI: Normoactive bowel sounds, nontender, nondistended  Skin/Integumen: No rashes  Neuro/Psych: Appropriate mood and affect, A&Ox3, moves all extremities    Discharge Disposition   Discharged to home  Condition at discharge: Stable    Consultations This Hospital Stay   INFECTIOUS DISEASES IP CONSULT    Time Spent on this Encounter   I, Daniel Ruiz, personally saw the patient today and spent greater than 30 minutes  discharging this patient.    Discharge Orders     Neurology Adult Referral     Reason for your hospital stay   You were hospitalized for a pleural effusion which was drained with a chest tube.     Activity   Your activity upon discharge: activity as tolerated     Follow-up and recommended labs and tests    Follow up with primary care provider, Oskar Arthur, within 7 days for hospital follow- up.  No follow up labs or test are needed.  - Follow up with neurology to discuss your Keppra.  - Please call Dr. Munson tomorrow at [phone] for follow up of your pleural fluid cultures     Discharge Instructions   Do not drink alcohol while you are taking antibiotics. After your antibiotic course, we recommend that you limit your alcohol use to one drink a day.     Full Code     Diet   Follow this diet upon discharge: Orders Placed This Encounter     Regular Diet Adult       Discharge Medications   Current Discharge Medication List      START taking these medications    Details   amoxicillin-clavulanate (AUGMENTIN) 875-125 MG per tablet Take 1 tablet by mouth 2 times daily for 10 days  Qty: 20 tablet, Refills: 0    Associated Diagnoses: Empyema lung (H)         CONTINUE these medications which have NOT CHANGED    Details   levETIRAcetam 1000 MG TABS Take 1,000 mg by mouth every morning RX is for 1000 mg q12h but he takes 1000 mg daily.      metroNIDAZOLE (METROGEL) 0.75 % topical gel Apply topically every morning Apply to nose            Allergies   No Known Allergies  Data   Most Recent 3 CBC's:  Recent Labs   Lab Test  09/07/18   0735  09/06/18   0734  09/05/18   2030   WBC  5.8  6.9  7.8   HGB  11.9*  12.8*  13.0*   MCV  98  98  97   PLT  209  207  188      Most Recent 3 BMP's:  Recent Labs   Lab Test  09/07/18   0735  09/06/18   0734  09/05/18   2030   NA  138  136  135   POTASSIUM  3.9  4.3  4.0   CHLORIDE  104  100  96   CO2  23  27  29   BUN  11  12  13   CR  0.71  0.71  0.69   ANIONGAP  11  9  10   MIKE  8.4*  8.9   9.0   GLC  93  99  103*     Most Recent 2 LFT's:  Recent Labs   Lab Test  08/06/18   1100  01/20/16   1340   AST  41  91*   ALT  32  94*   ALKPHOS  86  85   BILITOTAL  2.2*  0.7     Most Recent INR's and Anticoagulation Dosing History:  Anticoagulation Dose History     Recent Dosing and Labs Latest Ref Rng & Units 9/28/2006 8/5/2018 9/6/2018    INR 0.86 - 1.14 0.99 0.95 1.05        Most Recent 3 Troponin's:No lab results found.  Most Recent Cholesterol Panel:  Recent Labs   Lab Test  01/22/15   1026   CHOL  184   LDL  61   HDL  112   TRIG  55     Most Recent 6 Bacteria Isolates From Any Culture (See EPIC Reports for Culture Details):  Recent Labs   Lab Test  09/06/18   1605  09/05/18   2030   CULT  Culture negative monitoring continues  Canceled, Test credited  Duplicate request    No growth after 4 days  No growth after 4 days     Most Recent TSH, T4 and A1c Labs:  Recent Labs   Lab Test  02/12/15   1550   TSH  2.72     Results for orders placed or performed during the hospital encounter of 09/05/18   CT Chest w Contrast    Narrative    CT CHEST WITH CONTRAST September 6, 2018 10:42 AM     HISTORY: Empyema.    TECHNIQUE: 80mL Isovue-370. Radiation dose for this scan was reduced  using automated exposure control, adjustment of the mA and/or kV  according to patient size, or iterative reconstruction technique.    COMPARISON: 8/14/2018.    FINDINGS: There is a large right hydropneumothorax. Portions of the  pleural fluid appear loculated superiorly. No evidence of tension. No  pericardial or left pleural effusion. The lungs are emphysematous. No  discrete pulmonary nodule or mass demonstrated. Prominent mediastinal  lymph nodes are unchanged. No hilar or axillary adenopathy. Images  through the upper abdomen demonstrate fatty infiltration of the liver.      Impression    IMPRESSION: Large right-sided hydropneumothorax. Please see separate  report describing CT-guided chest tube placement.    CLIVE JALLOH MD   CT  Chest Tube with Cath Placement    Narrative    CT GUIDED CHEST TUBE PLACEMENT 9/6/2018 11:19 AM     HISTORY: Right hydropneumothorax.     COMPARISON: None.     TECHNIQUE: Volumetric helical acquisition of CT images without  contrast. Radiation dose for this scan was reduced using automated  exposure control, adjustment of the mA and/or kV according to patient  size, or iterative reconstruction technique.    MEDICATIONS:  4 mL Lidocaine 1% locally, RN Beth Boone, 10 minutes  sedation time, 1mg Versed, 50 mcg Fentanyl.    TECHNIQUE: Informed consent was obtained from the patient. A timeout  was performed. Patient received conscious sedation with Versed and  fentanyl with constant monitoring by the physician and nursing staff.  Total sedation time was 10 minutes. The patient was scanned in supine  position for procedural planning. The skin was prepped and draped in a  sterile manner, and anesthetized with 1% lidocaine. The pleural space  was accessed using trocar technique and a 12 [place]bourgish locking pigtail  catheter was advanced into the pleural space and its position verified  with imaging. The catheter was secured to the skin using an adhesive  bandage. There were no immediate complications and patient left the CT  suite in satisfactory condition.     FINDINGS: Limited CT images redemonstrate a large right  hydropneumothorax. Subsequent images demonstrate CT-guided chest tube  placement.      Impression    IMPRESSION: Successful CT guided right pigtail chest tube placement.    CLIVE JALLOH MD   XR Chest Port 1 View    Narrative    CHEST ONE VIEW PORTABLE   9/7/2018 5:16 AM     HISTORY:  Chest tube placement.     COMPARISON: Chest CT 9/6/2018.       Impression    IMPRESSION:   1. Right basilar chest tube is in place. Right-sided hydropneumothorax  is markedly decreased in size compared to previous chest CT.  Persistent loculated right apical pneumothorax.  2. Surgical staple lines at the lung apices are again  seen. Patchy  opacities at the right lung base could represent scarring or  atelectasis, although infiltrate would be difficult to exclude. Lungs  remain hyperinflated. Cardiac silhouette is unchanged.    LOLY NORRIS MD   XR Chest Port 1 View    Narrative    XR CHEST PORT ONE VIEW  9/8/2018 5:56 AM     HISTORY: Chest tube placement.     COMPARISON: 9/7/2018.      Impression    IMPRESSION: Right basilar chest tube remains in place. Small right  apical pneumothorax is again seen and not significantly changed. No  definite left pneumothorax. Surgical staple lines at the lung apices  are again noted. Streaky right basilar opacity is similar to prior. No  definite new infiltrate.    LOLY NORRIS MD   XR Chest Port 1 View    Narrative    PORTABLE CHEST ONE VIEW   9/9/2018 6:03 AM    HISTORY: Chest tube placement.    COMPARISON: 9/8/2018.      Impression    IMPRESSION: A chest tube is again seen in the right chest base. A very  small right apical pneumothorax persists. Surgical clips are seen  throughout the right upper chest. No other abnormality is seen. I see  no definite change since the previous examination.     ELEONORA BRUCE MD

## 2018-09-09 NOTE — PROGRESS NOTES
Discharge order received. Discharge instructions discussed and explained with pt. All questions answered. Pt verbalized understanding. PIV removed. Pt awaiting for a family ride at this time.

## 2018-09-09 NOTE — DISCHARGE INSTRUCTIONS
[facility]  Discharge Orders & Follow-up Care  Video-Assisted Thoracoscopy or Thoracotomy    You already have your follow-up appointments scheduled for you:  Please go to [facility] for a chest x-ray at _____________________________. [facility] is located in the same building ([facility], [address] [place] [address]) as Dr. Osorio' office. They are in [address].  Please then go for your post-operative follow-up appointment in Dr. Osorio' office, on the second floor of the [facility], [address] at ____________. You will see either Drea Vilchis PA-C or Dr. Osorio during your appointment.      Call Mamie at Dr. Osorio  office at [phone] to make a return appointment with a chest x-ray on Monday 09-17  Your appointment will be with either Drea Vilchis PA-C or Piedad Glynn PA-C, or Dr. Osorio.

## 2018-09-09 NOTE — PLAN OF CARE
Problem: Chest Tube Drainage Device (Adult)  Goal: Signs and Symptoms of Listed Potential Problems Will be Absent, Minimized or Managed (Chest Tube Drainage Device)  Signs and symptoms of listed potential problems will be absent, minimized or managed by discharge/transition of care (reference Chest Tube Drainage Device (Adult) CPG).   Outcome: Improving  A&OX4. Independent with all cares. Chest tube removed. Needed PRN PO oxycodone x 1 right after removal. No pain currently. Lungs diminished throughout. No wheezing or crackles. Denies CARO. No chest pain. Breathing unlabored. Eating and drinking well. Free from N/V/D. Tele NSR. Afebrile. VSS. Discharging home soon.

## 2018-09-09 NOTE — PROGRESS NOTES
THORACIC SURGERY     Minimal CT output  CXR no ptx or effusion    CT out    D/C as per hospitalist, antibiotics as per ID    PHOEBE MEDINA MD [facility] ONCOLOGY THORACIC SURGERY  CELL:  [phone]  OFFICE: [phone]

## 2018-09-11 ENCOUNTER — PATIENT OUTREACH (OUTPATIENT)
Dept: CARE COORDINATION | Facility: CLINIC | Age: 53
End: 2018-09-11

## 2018-09-11 ENCOUNTER — TELEPHONE (OUTPATIENT)
Dept: FAMILY MEDICINE | Facility: CLINIC | Age: 53
End: 2018-09-11

## 2018-09-11 LAB
BACTERIA SPEC CULT: NO GROWTH
Lab: NORMAL
Lab: NORMAL
SPECIMEN SOURCE: NORMAL

## 2018-09-11 NOTE — TELEPHONE ENCOUNTER
"ED/Discharge Protocol    \"Hi, my name is Skyler Kerns, a registered nurse, and I am calling on behalf of Dr. Arthur's office at [place].  I am calling to follow up and see how things are going for you after your recent visit.\"    \"I see that you were in the (ER/UC/IP) on 9/5/18.    How are you doing now that you are home?\" doing well (spoke with care taker Garima - consent to communicate on file)    Is patient experiencing symptoms that may require a hospital visit?  no    Discharge Instructions    \"Let's review your discharge instructions.  What is/are the follow-up recommendations?  Pt. Response: f/u with PCP, neurology and Dr. Munson    \"Were you instructed to make a follow-up appointment?\"  Pt. Response: Yes.  Has appointment been made?   Yes      \"When you see the provider, I would recommend that you bring your discharge instructions with you.    Medications    \"How many new medications are you on since your hospitalization/ED visit?\"    0-1  \"How many of your current medicines changed (dose, timing, name, etc.) while you were in the hospital/ED visit?\"   0-1  \"Do you have questions about your medications?\"   No  \"Were you newly diagnosed with heart failure, COPD, diabetes or did you have a heart attack?\"   No  For patients on insulin: \"Did you start on insulin in the hospital or did you have your insulin dose changed?\"   No    Medication reconciliation completed? Yes    Was MTM referral placed (*Make sure to put transitions as reason for referral)?   No    Call Summary    \"Do you have any questions or concerns about your condition or care plan at the moment?\"    No       \"If you have questions or things don't continue to improve, we encourage you contact us through the main clinic number,  [phone].  Even if the clinic is not open, triage nurses are available 24/7 to help you.     We would like you to know that our clinic has extended hours (provide information).  We also have urgent care " "(provide details on closest location and hours/contact info)\"      \"Thank you for your time and take care!\"        "

## 2018-09-11 NOTE — TELEPHONE ENCOUNTER
ED / Discharge Outreach Protocol    Patient Contact    Attempt # 1    Was call answered?  No.  Left message on voicemail with information to call me back.    Reason for your hospital stay   You were hospitalized for a pleural effusion which was drained with a chest tube.     Follow-up and recommended labs and tests    Follow up with primary care provider, Oskar Arthur, within 7 days for hospital follow- up.  No follow up labs or test are needed.  Appointment scheduled 9/12/2018  - Follow up with neurology to discuss your Keppra.  - Please call Dr. Munson tomorrow at [phone] for follow up of your pleural fluid cultures      Discharge Instructions   Do not drink alcohol while you are taking antibiotics. After your antibiotic course, we recommend that you limit your alcohol use to one drink a day.

## 2018-09-11 NOTE — PROGRESS NOTES
Clinic Care Coordination Contact  Care Team Conversations    Triage spoke with patient's caregiver.  No clinic care coordination needs were identified.   Patient has appointment with PCP 9/12/18.   Clinic Care Coordinator RN will review chart after visit.

## 2018-09-11 NOTE — TELEPHONE ENCOUNTER
Pt was discharged from [facility] on 9/9 after being treated for a seizure. Please call the pt. Thank you.  Fani Michaels,

## 2018-09-12 ENCOUNTER — OFFICE VISIT (OUTPATIENT)
Dept: FAMILY MEDICINE | Facility: CLINIC | Age: 53
End: 2018-09-12
Payer: COMMERCIAL

## 2018-09-12 VITALS
OXYGEN SATURATION: 97 % | SYSTOLIC BLOOD PRESSURE: 103 MMHG | WEIGHT: 166 LBS | TEMPERATURE: 98.3 F | HEART RATE: 102 BPM | BODY MASS INDEX: 19.21 KG/M2 | HEIGHT: 78 IN | DIASTOLIC BLOOD PRESSURE: 73 MMHG | RESPIRATION RATE: 14 BRPM

## 2018-09-12 DIAGNOSIS — R53.81 PHYSICAL DECONDITIONING: Primary | ICD-10-CM

## 2018-09-12 DIAGNOSIS — J86.9 EMPYEMA LUNG (H): ICD-10-CM

## 2018-09-12 DIAGNOSIS — R56.9 SEIZURE (H): ICD-10-CM

## 2018-09-12 DIAGNOSIS — F10.29 ALCOHOL DEPENDENCE WITH UNSPECIFIED ALCOHOL-INDUCED DISORDER (H): ICD-10-CM

## 2018-09-12 DIAGNOSIS — J93.83 OTHER PNEUMOTHORAX: ICD-10-CM

## 2018-09-12 LAB
ERYTHROCYTE [DISTWIDTH] IN BLOOD BY AUTOMATED COUNT: 13.6 % (ref 10–15)
HCT VFR BLD AUTO: 38.9 % (ref 40–53)
HGB BLD-MCNC: 12.4 G/DL (ref 13.3–17.7)
MCH RBC QN AUTO: 31.6 PG (ref 26.5–33)
MCHC RBC AUTO-ENTMCNC: 31.9 G/DL (ref 31.5–36.5)
MCV RBC AUTO: 99 FL (ref 78–100)
PLATELET # BLD AUTO: 388 10E9/L (ref 150–450)
RBC # BLD AUTO: 3.92 10E12/L (ref 4.4–5.9)
WBC # BLD AUTO: 5.9 10E9/L (ref 4–11)

## 2018-09-12 PROCEDURE — 80048 BASIC METABOLIC PNL TOTAL CA: CPT | Performed by: FAMILY MEDICINE

## 2018-09-12 PROCEDURE — 85027 COMPLETE CBC AUTOMATED: CPT | Performed by: FAMILY MEDICINE

## 2018-09-12 PROCEDURE — 99495 TRANSJ CARE MGMT MOD F2F 14D: CPT | Performed by: FAMILY MEDICINE

## 2018-09-12 PROCEDURE — 36415 COLL VENOUS BLD VENIPUNCTURE: CPT | Performed by: FAMILY MEDICINE

## 2018-09-12 NOTE — PROGRESS NOTES
"  SUBJECTIVE:   Jorge L Townsend is a 52 year old male who presents to clinic today for the following health issues:          Hospital Follow-up Visit:    Hospital/Nursing Home/IP Rehab Facility: [facility]  Date of Admission: 9/5/18  Date of Discharge: 9/9/18  Reason(s) for Admission:  Right lung empyema, s/p chest tube 9/6-9/9  History of alcohol dependence  Possible seizure disorder  Severe malnutrition            Problems taking medications regularly:  None       Medication changes since discharge: None       Problems adhering to non-medication therapy:  None    Summary of hospitalization:  [facility]  Diagnostic Tests/Treatments reviewed.  Follow up needed: none  Other Healthcare Providers Involved in Patient s Care:         None  Update since discharge: improved.     Post Discharge Medication Reconciliation: discharge medications reconciled, continue medications without change.  Plan of care communicated with patient     Coding guidelines for this visit:  Type of Medical   Decision Making Face-to-Face Visit       within 7 Days of discharge Face-to-Face Visit        within 14 days of discharge   Moderate Complexity 51353 [id number]   High Complexity 93804 12402            Problem list and histories reviewed & adjusted, as indicated.  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     Other chronic nonalcoholic liver disease     Alcohol dependence (H)     Alcohol withdrawal seizure     Seizure (H)     Empyema lung (H)     Past Surgical History:   Procedure Laterality Date     THORACIC SURGERY         Social History   Substance Use Topics     Smoking status: Former Smoker     Smokeless tobacco: Never Used      Comment: quit 2003     Alcohol use Yes      Comment: \"2-3 straight vodkas nightly \"      Family " History   Problem Relation Age of Onset     Cancer Father      Cerebrovascular Disease Sister      Diabetes No family hx of      Hypertension No family hx of      Thyroid Disease No family hx of      Glaucoma No family hx of      Macular Degeneration No family hx of          Current Outpatient Prescriptions   Medication Sig Dispense Refill     amoxicillin-clavulanate (AUGMENTIN) 875-125 MG per tablet Take 1 tablet by mouth 2 times daily for 10 days 20 tablet 0     levETIRAcetam 1000 MG TABS Take 1,000 mg by mouth every morning RX is for 1000 mg q12h but he takes 1000 mg daily.       metroNIDAZOLE (METROGEL) 0.75 % topical gel Apply topically every morning Apply to nose        No Known Allergies  Recent Labs   Lab Test  09/07/18   0735  09/06/18   0734   08/06/18   1100   01/20/16   1340  01/11/16   0840   02/12/15   1550   01/22/15   1026   LDL   --    --    --    --    --    --    --    --    --    --   61   HDL   --    --    --    --    --    --    --    --    --    --   112   TRIG   --    --    --    --    --    --    --    --    --    --   55   ALT   --    --    --   32   --   94*  67   < >   --    < >  196*   CR  0.71  0.71   < >  0.69   < >   --   0.70   < >   --    < >  0.66   GFRESTIMATED  >90  >90   < >  >90   < >   --   >90  Non  GFR Calc     < >   --    < >  >90  Non  GFR Calc     GFRESTBLACK  >90  >90   < >  >90   < >   --   >90   GFR Calc     < >   --    < >  >90   GFR Calc     POTASSIUM  3.9  4.3   < >  3.7   < >   --   3.8   < >   --    < >  4.1   TSH   --    --    --    --    --    --    --    --   2.72   --    --     < > = values in this interval not displayed.      BP Readings from Last 3 Encounters:   09/12/18 103/73   09/09/18 107/73   08/22/18 124/85    Wt Readings from Last 3 Encounters:   09/12/18 166 lb (75.3 kg)   09/05/18 170 lb 6.4 oz (77.3 kg)   08/22/18 175 lb (79.4 kg)           Reviewed and updated as needed this visit by  "clinical staff       Reviewed and updated as needed this visit by Provider         ROS:  Constitutional, HEENT, cardiovascular, pulmonary, gi and gu systems are negative, except as otherwise noted.    OBJECTIVE:     /73 (Cuff Size: Adult Regular)  Pulse 102  Temp 98.3  F (36.8  C) (Tympanic)  Resp 14  Ht 6' 6\" (1.981 m)  Wt 166 lb (75.3 kg)  SpO2 97%  BMI 19.18 kg/m2  Body mass index is 19.18 kg/(m^2).  GENERAL: healthy, alert and no distress  NECK: no adenopathy, no asymmetry, masses, or scars and thyroid normal to palpation  RESP: lungs clear to auscultation - no rales, rhonchi or wheezes  CV: regular rate and rhythm, normal S1 S2, no S3 or S4, no murmur, click or rub, no peripheral edema and peripheral pulses strong  ABDOMEN: soft, nontender, no hepatosplenomegaly, no masses and bowel sounds normal  MS: no gross musculoskeletal defects noted, no edema        ASSESSMENT/PLAN:   ASSESSMENT / PLAN:  (R53.81) Physical deconditioning  (primary encounter diagnosis)  Comment: has wt loss after discharge, has improving appetite   Plan: CBC with platelets, Basic metabolic panel        Will review the lab and update pt     (J93.83) Other pneumothorax  Comment: has been improving, has no SOB nor CP  Plan: encouraged him to see chest surgery as scheduled     (F10.29) Alcohol dependence with unspecified alcohol-induced disorder (H)  Comment: discussed about long term plan for alcohol cessation. Pt is not interested in taking med including accamprosate or antabuse, declined seeing counsellor    Plan: has extended conversation about treatment of alcohol dependency. Pt declined all the offered option. We will bring the conversation again at next next time    (J86.9) Empyema lung (H)  Comment: improving, will have him to see chest surgery as scheduled    Plan: mentioned above     (R56.9) Seizure (H)  Comment: stable   Plan: will keep monitoring         FUTURE APPOINTMENTS:       - Follow-up visit in 3 months for " alcohol dependency and physical decondition     Oskar Arthur MD  [facility] [place]

## 2018-09-12 NOTE — MR AVS SNAPSHOT
After Visit Summary   9/12/2018    Jorge L Townsend    MRN: [id number]           Patient Information     Date Of Birth          1965        Visit Information        Provider Department      9/12/2018 1:40 PM Oskar Arthur MD [facility]        Today's Diagnoses     Physical deconditioning    -  1    Other pneumothorax        Alcohol dependence with unspecified alcohol-induced disorder (H)        Empyema lung (H)        Seizure (H)           Follow-ups after your visit        Follow-up notes from your care team     Return in about 3 months (around 12/12/2018) for alcohol dependency .      Who to contact     If you have questions or need follow up information about today's clinic visit or your schedule please contact [facility] directly at [phone].  Normal or non-critical lab and imaging results will be communicated to you by MyChart, letter or phone within 4 business days after the clinic has received the results. If you do not hear from us within 7 days, please contact the clinic through MyChart or phone. If you have a critical or abnormal lab result, we will notify you by phone as soon as possible.  Submit refill requests through [employer] or call your pharmacy and they will forward the refill request to us. Please allow 3 business days for your refill to be completed.          Additional Information About Your Visit        MyChart Information     [employer] gives you secure access to your electronic health record. If you see a primary care provider, you can also send messages to your care team and make appointments. If you have questions, please call your primary care clinic.  If you do not have a primary care provider, please call [phone] and they will assist you.        Care EveryWhere ID     This is your Care EveryWhere ID. This could be used by other organizations to access your [facility] medical records  [id number]        Your Vitals Were     Pulse  "Temperature Respirations Height Pulse Oximetry BMI (Body Mass Index)    102 98.3  F (36.8  C) (Tympanic) 14 6' 6\" (1.981 m) 97% 19.18 kg/m2       Blood Pressure from Last 3 Encounters:   09/12/18 103/73   09/09/18 107/73   08/22/18 124/85    Weight from Last 3 Encounters:   09/12/18 166 lb (75.3 kg)   09/05/18 170 lb 6.4 oz (77.3 kg)   08/22/18 175 lb (79.4 kg)              We Performed the Following     Basic metabolic panel     CBC with platelets        Primary Care Provider Office Phone # Fax #    Oskar Arthur [phone]       [address] [facility] [address]        Equal Access to Services     [facility]: John hurst Soobed, waaxda luqadaha, qaybta kaalmada nano, gia lazo . So [facility] [phone].    ATENCIÓN: Si habla español, tiene a pleitez disposición servicios gratuitos de asistencia lingüística. [facility] [phone].    We comply with applicable federal civil rights laws and [place] laws. We do not discriminate on the basis of race, color, national origin, age, disability, sex, sexual orientation, or gender identity.            Thank you!     Thank you for choosing [facility]  for your care. Our goal is always to provide you with excellent care. Hearing back from our patients is one way we can continue to improve our services. Please take a few minutes to complete the written survey that you may receive in the mail after your visit with us. Thank you!             Your Updated Medication List - Protect others around you: Learn how to safely use, store and throw away your medicines at www.disposemymeds.org.          This list is accurate as of 9/12/18  2:12 PM.  Always use your most recent med list.                   Brand Name Dispense Instructions for use Diagnosis    amoxicillin-clavulanate 875-125 MG per tablet    AUGMENTIN    20 tablet    Take 1 tablet by mouth 2 times daily for 10 days    Empyema lung (H) "       levETIRAcetam 1000 MG Tabs      Take 1,000 mg by mouth every morning RX is for 1000 mg q12h but he takes 1000 mg daily.        metroNIDAZOLE 0.75 % topical gel    METROGEL     Apply topically every morning Apply to nose

## 2018-09-13 LAB
ANION GAP SERPL CALCULATED.3IONS-SCNC: 9 MMOL/L (ref 3–14)
BUN SERPL-MCNC: 9 MG/DL (ref 7–30)
CALCIUM SERPL-MCNC: 8.9 MG/DL (ref 8.5–10.1)
CHLORIDE SERPL-SCNC: 104 MMOL/L (ref 94–109)
CO2 SERPL-SCNC: 27 MMOL/L (ref 20–32)
CREAT SERPL-MCNC: 0.67 MG/DL (ref 0.66–1.25)
GFR SERPL CREATININE-BSD FRML MDRD: >90 ML/MIN/1.7M2
GLUCOSE SERPL-MCNC: 94 MG/DL (ref 70–99)
POTASSIUM SERPL-SCNC: 5.3 MMOL/L (ref 3.4–5.3)
SODIUM SERPL-SCNC: 140 MMOL/L (ref 133–144)

## 2018-10-05 ENCOUNTER — PATIENT OUTREACH (OUTPATIENT)
Dept: CARE COORDINATION | Facility: CLINIC | Age: 53
End: 2018-10-05

## 2018-10-05 NOTE — PROGRESS NOTES
Clinic Care Coordination Contact  [facility]/Voicemail       Clinical Data: Care Coordinator Outreach  Outreach attempted.  Left message on voicemail with call back information and requested return call.  Plan:  Care Coordinator will try to reach patient again in 1-2 business days.

## 2018-10-18 ENCOUNTER — PATIENT OUTREACH (OUTPATIENT)
Dept: CARE COORDINATION | Facility: CLINIC | Age: 53
End: 2018-10-18

## 2018-10-18 NOTE — PROGRESS NOTES
Clinic Care Coordination Contact  [facility]/Voicemail       Clinical Data: Care Coordinator Outreach  Outreach attempted.  Phone was answered by a woman. She said this mis the wrong number.   Patient has no future appointments scheduled.   Plan: Care Coordinator will mail out care coordination introduction letter with care coordinator contact information and explanation of care coordination services. Care Coordinator will try to reach patient again in 3-5 business days.

## 2018-10-18 NOTE — LETTER
[place] CARE COORDINATION  .  October 18, 2018    Jorge L Townsend  [address]   [place] [address]      Dear Jorge L,    I am a clinic care coordinator who works with Oskar Arthur MD at [facility]. . I wanted to introduce myself and provide you with my contact information for you to be able to call me with any questions or concerns. I wanted to introduce myself and provide you with my contact information so that you can call me with questions or concerns about your health care. Below is a description of clinic care coordination and how I can further assist you.     The clinic care coordinator is a registered nurse and/or  who understand the health care system. The goal of clinic care coordination is to help you manage your health and improve access to the [facility] system in the most efficient manner. The registered nurse can assist you in meeting your health care goals by providing education, coordinating services, and strengthening the communication among your providers. The  can assist you with financial, behavioral, psychosocial, chemical dependency, counseling, and/or psychiatric resources.    Please feel free to contact me at [phone], with any questions or concerns. We at [facility] are focused on providing you with the highest-quality healthcare experience possible and that all starts with you.     Sincerely,       Cora Ross RN  Clinic Care Coordinator   [facility]   Phone: [phone]       Enclosed: I have enclosed a copy of a 24 Hour Access Plan. This has helpful phone numbers for you to call when needed. Please keep this in an easy to access place to use as needed.

## 2018-10-18 NOTE — LETTER
Health Care Home - Access Care Plan    About Me  Patient Name:  Alon Oliver    YOB: 1965  Age:                             53 year old   [place] MRN:            [id number] Telephone Information:     Home Phone [phone]   Mobile [phone]       Address:    [address] [place] [address] Email address:  [email or web address]      Emergency Contact(s)  Name Relationship Lgl Grd Work Phone Home Phone Mobile Phone   1. APPLE MARTIN* Sister   [phone]    2. IVIS OLIVER Brother No None [phone]   3. ALON TELLO Friend No none [phone]             Health Maintenance:      My Access Plan  Medical Emergency 911   Questions or concerns during clinic hours Primary Clinic Line, I will call the clinic directly: [facility] - [phone]   24 Hour Appointment Line [phone] or  [phone]) (toll free)   24 Hour Nurse Line [phone] (toll free)   Questions or concerns outside clinic hours 24 Hour Appointment Line, I will call the after-hours on-call line:   [facility] [phone] or [phone]) (toll-free)   Preferred Urgent Care     Preferred Hospital     Preferred Pharmacy [id number] - [place], [place] - [address]     Behavioral Health Crisis Line The National Suicide Prevention Lifeline at 1-818.946.8904 or 911     My Care Team Members  Patient Care Team       Relationship Specialty Notifications Start End    Oskar Arthur MD PCP - General Family Practice  1/12/16     Phone: [phone] Fax: [phone]         [address] [address]    Eli Ross RN Clinic Care Coordinator Primary Care - CC Admissions 9/11/18     Phone: [phone] Fax: [phone]               My Medical and Care Information  Problem List   Patient Active Problem List   Diagnosis     Pneumothorax     Back pain     Essential tremor     CARDIOVASCULAR SCREENING; LDL  GOAL LESS THAN 160     Drusen of optic disc, 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

## 2018-10-26 ENCOUNTER — HOSPITAL ENCOUNTER (INPATIENT)
Facility: CLINIC | Age: 53
LOS: 3 days | Discharge: HOME | End: 2018-10-29
Attending: EMERGENCY MEDICINE | Admitting: HOSPITALIST
Payer: COMMERCIAL

## 2018-10-26 DIAGNOSIS — F10.930 ALCOHOL WITHDRAWAL SEIZURE WITHOUT COMPLICATION (H): ICD-10-CM

## 2018-10-26 DIAGNOSIS — R56.9 ALCOHOL WITHDRAWAL SEIZURE WITHOUT COMPLICATION (H): ICD-10-CM

## 2018-10-26 LAB
ALBUMIN SERPL-MCNC: 4.6 G/DL (ref 3.4–5)
ALP SERPL-CCNC: 100 U/L (ref 40–150)
ALT SERPL W P-5'-P-CCNC: 83 U/L (ref 0–70)
ANION GAP SERPL CALCULATED.3IONS-SCNC: 18 MMOL/L (ref 3–14)
AST SERPL W P-5'-P-CCNC: 163 U/L (ref 0–45)
BASOPHILS # BLD AUTO: 0 10E9/L (ref 0–0.2)
BASOPHILS NFR BLD AUTO: 0.5 %
BILIRUB DIRECT SERPL-MCNC: 0.4 MG/DL (ref 0–0.2)
BILIRUB SERPL-MCNC: 1.8 MG/DL (ref 0.2–1.3)
BUN SERPL-MCNC: 16 MG/DL (ref 7–30)
CALCIUM SERPL-MCNC: 9.9 MG/DL (ref 8.5–10.1)
CHLORIDE SERPL-SCNC: 97 MMOL/L (ref 94–109)
CO2 SERPL-SCNC: 20 MMOL/L (ref 20–32)
CREAT SERPL-MCNC: 0.8 MG/DL (ref 0.66–1.25)
DIFFERENTIAL METHOD BLD: ABNORMAL
EOSINOPHIL # BLD AUTO: 0 10E9/L (ref 0–0.7)
EOSINOPHIL NFR BLD AUTO: 0.3 %
ERYTHROCYTE [DISTWIDTH] IN BLOOD BY AUTOMATED COUNT: 16.1 % (ref 10–15)
ETHANOL SERPL-MCNC: <0.01 G/DL
GFR SERPL CREATININE-BSD FRML MDRD: >90 ML/MIN/1.7M2
GLUCOSE SERPL-MCNC: 187 MG/DL (ref 70–99)
HCT VFR BLD AUTO: 34.4 % (ref 40–53)
HGB BLD-MCNC: 11.5 G/DL (ref 13.3–17.7)
IMM GRANULOCYTES # BLD: 0.1 10E9/L (ref 0–0.4)
IMM GRANULOCYTES NFR BLD: 1.1 %
LIPASE SERPL-CCNC: 219 U/L (ref 73–393)
LYMPHOCYTES # BLD AUTO: 1.4 10E9/L (ref 0.8–5.3)
LYMPHOCYTES NFR BLD AUTO: 21.2 %
MAGNESIUM SERPL-MCNC: 1.7 MG/DL (ref 1.6–2.3)
MCH RBC QN AUTO: 33 PG (ref 26.5–33)
MCHC RBC AUTO-ENTMCNC: 33.4 G/DL (ref 31.5–36.5)
MCV RBC AUTO: 99 FL (ref 78–100)
MONOCYTES # BLD AUTO: 1 10E9/L (ref 0–1.3)
MONOCYTES NFR BLD AUTO: 15.8 %
NEUTROPHILS # BLD AUTO: 4 10E9/L (ref 1.6–8.3)
NEUTROPHILS NFR BLD AUTO: 61.1 %
NRBC # BLD AUTO: 0 10*3/UL
NRBC BLD AUTO-RTO: 0 /100
PHOSPHATE SERPL-MCNC: 3.4 MG/DL (ref 2.5–4.5)
PLATELET # BLD AUTO: 78 10E9/L (ref 150–450)
POTASSIUM SERPL-SCNC: 4.3 MMOL/L (ref 3.4–5.3)
PROT SERPL-MCNC: 8.3 G/DL (ref 6.8–8.8)
RBC # BLD AUTO: 3.48 10E12/L (ref 4.4–5.9)
SODIUM SERPL-SCNC: 135 MMOL/L (ref 133–144)
WBC # BLD AUTO: 6.5 10E9/L (ref 4–11)

## 2018-10-26 PROCEDURE — 83690 ASSAY OF LIPASE: CPT | Performed by: EMERGENCY MEDICINE

## 2018-10-26 PROCEDURE — 80320 DRUG SCREEN QUANTALCOHOLS: CPT | Performed by: EMERGENCY MEDICINE

## 2018-10-26 PROCEDURE — 99285 EMERGENCY DEPT VISIT HI MDM: CPT | Mod: 25

## 2018-10-26 PROCEDURE — 80177 DRUG SCRN QUAN LEVETIRACETAM: CPT | Performed by: EMERGENCY MEDICINE

## 2018-10-26 PROCEDURE — 25000128 H RX IP 250 OP 636

## 2018-10-26 PROCEDURE — 12000000 ZZH R&B MED SURG/OB

## 2018-10-26 PROCEDURE — 80076 HEPATIC FUNCTION PANEL: CPT | Performed by: EMERGENCY MEDICINE

## 2018-10-26 PROCEDURE — 93005 ELECTROCARDIOGRAM TRACING: CPT

## 2018-10-26 PROCEDURE — 25000128 H RX IP 250 OP 636: Performed by: EMERGENCY MEDICINE

## 2018-10-26 PROCEDURE — 85025 COMPLETE CBC W/AUTO DIFF WBC: CPT | Performed by: EMERGENCY MEDICINE

## 2018-10-26 PROCEDURE — 80048 BASIC METABOLIC PNL TOTAL CA: CPT | Performed by: EMERGENCY MEDICINE

## 2018-10-26 PROCEDURE — 25000128 H RX IP 250 OP 636: Performed by: HOSPITALIST

## 2018-10-26 PROCEDURE — 84100 ASSAY OF PHOSPHORUS: CPT | Performed by: EMERGENCY MEDICINE

## 2018-10-26 PROCEDURE — 83735 ASSAY OF MAGNESIUM: CPT | Performed by: EMERGENCY MEDICINE

## 2018-10-26 PROCEDURE — 99222 1ST HOSP IP/OBS MODERATE 55: CPT | Mod: AI | Performed by: HOSPITALIST

## 2018-10-26 PROCEDURE — 25000132 ZZH RX MED GY IP 250 OP 250 PS 637: Performed by: EMERGENCY MEDICINE

## 2018-10-26 PROCEDURE — 96375 TX/PRO/DX INJ NEW DRUG ADDON: CPT

## 2018-10-26 PROCEDURE — HZ2ZZZZ DETOXIFICATION SERVICES FOR SUBSTANCE ABUSE TREATMENT: ICD-10-PCS | Performed by: HOSPITALIST

## 2018-10-26 PROCEDURE — 96374 THER/PROPH/DIAG INJ IV PUSH: CPT

## 2018-10-26 RX ORDER — LORAZEPAM 2 MG/ML
1 INJECTION INTRAMUSCULAR ONCE
Status: COMPLETED | OUTPATIENT
Start: 2018-10-26 | End: 2018-10-26

## 2018-10-26 RX ORDER — ONDANSETRON 2 MG/ML
INJECTION INTRAMUSCULAR; INTRAVENOUS
Status: COMPLETED
Start: 2018-10-26 | End: 2018-10-26

## 2018-10-26 RX ORDER — FOLIC ACID 1 MG/1
1 TABLET ORAL ONCE
Status: COMPLETED | OUTPATIENT
Start: 2018-10-26 | End: 2018-10-26

## 2018-10-26 RX ORDER — POTASSIUM CHLORIDE 1500 MG/1
20-40 TABLET, EXTENDED RELEASE ORAL
Status: DISCONTINUED | OUTPATIENT
Start: 2018-10-26 | End: 2018-10-29 | Stop reason: HOSPADM

## 2018-10-26 RX ORDER — LEVETIRACETAM 500 MG/1
1000 TABLET ORAL EVERY 12 HOURS
Status: DISCONTINUED | OUTPATIENT
Start: 2018-10-26 | End: 2018-10-29 | Stop reason: HOSPADM

## 2018-10-26 RX ORDER — POTASSIUM CHLORIDE 1.5 G/1.58G
20-40 POWDER, FOR SOLUTION ORAL
Status: DISCONTINUED | OUTPATIENT
Start: 2018-10-26 | End: 2018-10-29 | Stop reason: HOSPADM

## 2018-10-26 RX ORDER — POTASSIUM CL/LIDO/0.9 % NACL 10MEQ/0.1L
10 INTRAVENOUS SOLUTION, PIGGYBACK (ML) INTRAVENOUS
Status: DISCONTINUED | OUTPATIENT
Start: 2018-10-26 | End: 2018-10-29 | Stop reason: HOSPADM

## 2018-10-26 RX ORDER — LANOLIN ALCOHOL/MO/W.PET/CERES
100 CREAM (GRAM) TOPICAL ONCE
Status: COMPLETED | OUTPATIENT
Start: 2018-10-26 | End: 2018-10-26

## 2018-10-26 RX ORDER — POTASSIUM CHLORIDE 29.8 MG/ML
20 INJECTION INTRAVENOUS
Status: DISCONTINUED | OUTPATIENT
Start: 2018-10-26 | End: 2018-10-29 | Stop reason: HOSPADM

## 2018-10-26 RX ORDER — FOLIC ACID 1 MG/1
1 TABLET ORAL DAILY
Status: DISCONTINUED | OUTPATIENT
Start: 2018-10-27 | End: 2018-10-29 | Stop reason: HOSPADM

## 2018-10-26 RX ORDER — LORAZEPAM 2 MG/ML
1-2 INJECTION INTRAMUSCULAR EVERY 30 MIN PRN
Status: DISCONTINUED | OUTPATIENT
Start: 2018-10-26 | End: 2018-10-29 | Stop reason: HOSPADM

## 2018-10-26 RX ORDER — ONDANSETRON 2 MG/ML
4 INJECTION INTRAMUSCULAR; INTRAVENOUS EVERY 6 HOURS PRN
Status: DISCONTINUED | OUTPATIENT
Start: 2018-10-26 | End: 2018-10-29 | Stop reason: HOSPADM

## 2018-10-26 RX ORDER — NALOXONE HYDROCHLORIDE 0.4 MG/ML
.1-.4 INJECTION, SOLUTION INTRAMUSCULAR; INTRAVENOUS; SUBCUTANEOUS
Status: DISCONTINUED | OUTPATIENT
Start: 2018-10-26 | End: 2018-10-29 | Stop reason: HOSPADM

## 2018-10-26 RX ORDER — ONDANSETRON 4 MG/1
4 TABLET, ORALLY DISINTEGRATING ORAL EVERY 6 HOURS PRN
Status: DISCONTINUED | OUTPATIENT
Start: 2018-10-26 | End: 2018-10-29 | Stop reason: HOSPADM

## 2018-10-26 RX ORDER — MAGNESIUM SULFATE HEPTAHYDRATE 40 MG/ML
4 INJECTION, SOLUTION INTRAVENOUS EVERY 4 HOURS PRN
Status: DISCONTINUED | OUTPATIENT
Start: 2018-10-26 | End: 2018-10-29 | Stop reason: HOSPADM

## 2018-10-26 RX ORDER — SODIUM CHLORIDE 9 MG/ML
INJECTION, SOLUTION INTRAVENOUS CONTINUOUS
Status: DISCONTINUED | OUTPATIENT
Start: 2018-10-26 | End: 2018-10-28

## 2018-10-26 RX ORDER — MULTIVITAMIN,THERAPEUTIC
1 TABLET ORAL ONCE
Status: COMPLETED | OUTPATIENT
Start: 2018-10-26 | End: 2018-10-26

## 2018-10-26 RX ORDER — POTASSIUM CHLORIDE 7.45 MG/ML
10 INJECTION INTRAVENOUS
Status: DISCONTINUED | OUTPATIENT
Start: 2018-10-26 | End: 2018-10-29 | Stop reason: HOSPADM

## 2018-10-26 RX ORDER — LANOLIN ALCOHOL/MO/W.PET/CERES
100 CREAM (GRAM) TOPICAL DAILY
Status: DISCONTINUED | OUTPATIENT
Start: 2018-10-27 | End: 2018-10-29 | Stop reason: HOSPADM

## 2018-10-26 RX ORDER — MULTIPLE VITAMINS W/ MINERALS TAB 9MG-400MCG
1 TAB ORAL DAILY
Status: DISCONTINUED | OUTPATIENT
Start: 2018-10-27 | End: 2018-10-29 | Stop reason: HOSPADM

## 2018-10-26 RX ORDER — MAGNESIUM OXIDE 400 MG/1
800 TABLET ORAL ONCE
Status: COMPLETED | OUTPATIENT
Start: 2018-10-26 | End: 2018-10-26

## 2018-10-26 RX ORDER — LORAZEPAM 1 MG/1
1-2 TABLET ORAL EVERY 30 MIN PRN
Status: DISCONTINUED | OUTPATIENT
Start: 2018-10-26 | End: 2018-10-29 | Stop reason: HOSPADM

## 2018-10-26 RX ADMIN — Medication 100 MG: at 21:17

## 2018-10-26 RX ADMIN — THERA TABS 1 TABLET: TAB at 21:17

## 2018-10-26 RX ADMIN — ONDANSETRON 4 MG: 2 INJECTION INTRAMUSCULAR; INTRAVENOUS at 21:17

## 2018-10-26 RX ADMIN — LORAZEPAM 1 MG: 2 INJECTION, SOLUTION INTRAMUSCULAR; INTRAVENOUS at 21:17

## 2018-10-26 RX ADMIN — Medication 800 MG: at 21:17

## 2018-10-26 RX ADMIN — SODIUM CHLORIDE: 9 INJECTION, SOLUTION INTRAVENOUS at 23:09

## 2018-10-26 RX ADMIN — FOLIC ACID 1 MG: 1 TABLET ORAL at 21:17

## 2018-10-26 RX ADMIN — LORAZEPAM 1 MG: 2 INJECTION, SOLUTION INTRAMUSCULAR; INTRAVENOUS at 21:43

## 2018-10-26 ASSESSMENT — LIFESTYLE VARIABLES
TOTAL SCORE: 8
VISUAL DISTURBANCES: NOT PRESENT
PAROXYSMAL SWEATS: NO SWEAT VISIBLE
AGITATION: NORMAL ACTIVITY
AUDITORY DISTURBANCES: NOT PRESENT
ORIENTATION AND CLOUDING OF SENSORIUM: ORIENTED AND CAN DO SERIAL ADDITIONS
HEADACHE, FULLNESS IN HEAD: NONE PRESENT
TREMOR: 5
NAUSEA AND VOMITING: 2
ANXIETY: MILDLY ANXIOUS

## 2018-10-26 ASSESSMENT — ENCOUNTER SYMPTOMS
SEIZURES: 1
BACK PAIN: 1

## 2018-10-26 NOTE — IP AVS SNAPSHOT
MRN:[id number]                      After Visit Summary   10/26/2018    Jorge L Townsend    MRN: [id number]           Thank you!     Thank you for choosing [facility] for your care. Our goal is always to provide you with excellent care. Hearing back from our patients is one way we can continue to improve our services. Please take a few minutes to complete the written survey that you may receive in the mail after you visit with us. Thank you!        Patient Information     Date Of Birth          1965        About your hospital stay     You were admitted on:  October 26, 2018 You last received care in the:  [place] [address] [facility]    You were discharged on:  October 29, 2018        Reason for your hospital stay       Seizure, alcohol withdrawal vs subtherapeutic Keppra                  Who to Call     For medical emergencies, please call 911.  For non-urgent questions about your medical care, please call your primary care provider or clinic, [phone]          Attending Provider     Provider Specialty    Otoniel Curry MD Emergency Medicine    [place], Jonatan Gardner,  HOSPITALIST    Karin Fofana MD Internal Medicine       Primary Care Provider Office Phone # Fax #    Oskar Arthur [phone]      After Care Instructions     Activity       Your activity upon discharge: activity as tolerated            Diet       Follow this diet upon discharge: Orders Placed This Encounter      Snacks/Supplements Adult: Boost Plus; Between Meals      Combination Diet Regular Diet Adult                  Follow-up Appointments     Follow-up and recommended labs and tests        Follow up with primary care provider, Oskar Arthur, within 7 days to evaluate medication change and for hospital follow- up.  The following labs/tests are recommended: keppra level.    Patient should also follow up with his neurologist within the next 4 weeks                  Your next 10  "appointments already scheduled     Nov 05, 2018 12:40 PM CST   Office Visit with Oskar Arthur MD   [facility] ([facility])    [address] [place] [address] [phone]           Bring a current list of meds and any records pertaining to this visit. For Physicals, please bring immunization records and any forms needing to be filled out. Please arrive 10 minutes early to complete paperwork.              Further instructions from your care team       TALK WITH DR. ARTHUR ABOUT FOLLOWING UP WITH A NEUROLOGIST.    Pending Results     No orders found from 10/24/2018 to 10/27/2018.            Statement of Approval     Ordered          10/29/18 9269  I have reviewed and agree with all the recommendations and orders detailed in this document.  EFFECTIVE NOW     Approved and electronically signed by:  Stephen Willis DO             Admission Information     Date & Time Provider Department Dept. Phone    10/26/2018 Karin Fofana MD [place] [address] [facility] [phone]      Your Vitals Were     Blood Pressure Pulse Temperature Respirations Height Weight    119/83 (BP Location: Right arm) 92 98.3  F (36.8  C) (Oral) 16 1.981 m (6' 6\") 70 kg (154 lb 5.2 oz)    Pulse Oximetry BMI (Body Mass Index)                97% 17.83 kg/m2          MyChart Information     goOutMap gives you secure access to your electronic health record. If you see a primary care provider, you can also send messages to your care team and make appointments. If you have questions, please call your primary care clinic.  If you do not have a primary care provider, please call [phone] and they will assist you.        Care EveryWhere ID     This is your Care EveryWhere ID. This could be used by other organizations to access your [facility] medical records  [id number]        Equal Access to Services     DEBORA HUYNH : milana Goldstein, bimal " gia kaurjerry thorntonaan ah. So [facility] [phone].    ATENCIÓN: Si raquel gavin, tiene a pleitez disposición servicios gratuitos de asistencia lingüística. Princess al [phone].    We comply with applicable federal civil rights laws and [place] laws. We do not discriminate on the basis of race, color, national origin, age, disability, sex, sexual orientation, or gender identity.               Review of your medicines      START taking        Dose / Directions    folic acid 1 MG tablet   Commonly known as:  FOLVITE   Used for:  Alcohol withdrawal seizure without complication (H)        Dose:  1 mg   Start taking on:  10/30/2018   Take 1 tablet (1 mg) by mouth daily   Quantity:  30 tablet   Refills:  0       multivitamin, therapeutic with minerals Tabs tablet   Used for:  Alcohol withdrawal seizure without complication (H)        Dose:  1 tablet   Start taking on:  10/30/2018   Take 1 tablet by mouth daily   Quantity:  30 each   Refills:  0       thiamine 100 MG tablet   Used for:  Alcohol withdrawal seizure without complication (H)        Dose:  100 mg   Start taking on:  10/30/2018   Take 1 tablet (100 mg) by mouth daily   Quantity:  30 tablet   Refills:  0         CONTINUE these medicines which have NOT CHANGED        Dose / Directions    levETIRAcetam 1000 MG Tabs        Dose:  1000 mg   Take 1,000 mg by mouth every morning RX is for 1000 mg q12h but he takes 1000 mg daily.   Refills:  0       metroNIDAZOLE 0.75 % topical gel   Commonly known as:  METROGEL        Apply topically every morning Apply to nose   Refills:  0            Where to get your medicines      These medications were sent to [facility] - [address]  [address] [place] [address]     Phone:  [phone]     folic acid 1 MG tablet    multivitamin, therapeutic with minerals Tabs tablet    thiamine 100 MG tablet                Protect others around you: Learn how to  safely use, store and throw away your medicines at www.disposemymeds.org.             Medication List: This is a list of all your medications and when to take them. Check marks below indicate your daily home schedule. Keep this list as a reference.      Medications           Morning Afternoon Evening Bedtime As Needed    folic acid 1 MG tablet   Commonly known as:  FOLVITE   Take 1 tablet (1 mg) by mouth daily   Start taking on:  10/30/2018   Last time this was given:  1 mg on 10/29/2018  8:23 AM   Next Dose Due:  Tuesday 10/20 morning                                   levETIRAcetam 1000 MG Tabs   Take 1,000 mg by mouth every morning RX is for 1000 mg q12h but he takes 1000 mg daily.   Last time this was given:  1,000 mg on 10/29/2018 11:02 AM   Next Dose Due:  11 pm tonight                                        metroNIDAZOLE 0.75 % topical gel   Commonly known as:  METROGEL   Apply topically every morning Apply to nose   Last time this was given:  10/29/2018 11:03 AM   Next Dose Due:  Tuesday 10/30 morning                                   multivitamin, therapeutic with minerals Tabs tablet   Take 1 tablet by mouth daily   Start taking on:  10/30/2018   Last time this was given:  1 tablet on 10/29/2018  8:23 AM   Next Dose Due:  Tuesday 10/30 morning                                   thiamine 100 MG tablet   Take 1 tablet (100 mg) by mouth daily   Start taking on:  10/30/2018   Last time this was given:  100 mg on 10/29/2018  8:23 AM   Next Dose Due:  Tuesday 10/30 morning

## 2018-10-26 NOTE — IP AVS SNAPSHOT
[place] [address] [facility]    [address] [place] [address]    Phone:  [phone]                                       After Visit Summary   10/26/2018    Jorge L Townsend    MRN: [id number]           After Visit Summary Signature Page     I have received my discharge instructions, and my questions have been answered. I have discussed any challenges I see with this plan with the nurse or doctor.    ..........................................................................................................................................  Patient/Patient Representative Signature      ..........................................................................................................................................  Patient Representative Print Name and Relationship to Patient    ..................................................               ................................................  Date                                   Time    ..........................................................................................................................................  Reviewed by Signature/Title    ...................................................              ..............................................  Date                                               Time          22EPIC Rev 08/18

## 2018-10-27 LAB
ALBUMIN SERPL-MCNC: 3.8 G/DL (ref 3.4–5)
ALP SERPL-CCNC: 81 U/L (ref 40–150)
ALT SERPL W P-5'-P-CCNC: 67 U/L (ref 0–70)
ANION GAP SERPL CALCULATED.3IONS-SCNC: 10 MMOL/L (ref 3–14)
AST SERPL W P-5'-P-CCNC: 137 U/L (ref 0–45)
BASOPHILS # BLD AUTO: 0 10E9/L (ref 0–0.2)
BASOPHILS NFR BLD AUTO: 0.3 %
BILIRUB SERPL-MCNC: 2 MG/DL (ref 0.2–1.3)
BUN SERPL-MCNC: 12 MG/DL (ref 7–30)
CALCIUM SERPL-MCNC: 8.8 MG/DL (ref 8.5–10.1)
CHLORIDE SERPL-SCNC: 100 MMOL/L (ref 94–109)
CO2 SERPL-SCNC: 26 MMOL/L (ref 20–32)
CREAT SERPL-MCNC: 0.61 MG/DL (ref 0.66–1.25)
DIFFERENTIAL METHOD BLD: ABNORMAL
EOSINOPHIL # BLD AUTO: 0 10E9/L (ref 0–0.7)
EOSINOPHIL NFR BLD AUTO: 0.4 %
ERYTHROCYTE [DISTWIDTH] IN BLOOD BY AUTOMATED COUNT: 16 % (ref 10–15)
GFR SERPL CREATININE-BSD FRML MDRD: >90 ML/MIN/1.7M2
GLUCOSE SERPL-MCNC: 91 MG/DL (ref 70–99)
HCT VFR BLD AUTO: 33.6 % (ref 40–53)
HGB BLD-MCNC: 11.1 G/DL (ref 13.3–17.7)
IMM GRANULOCYTES # BLD: 0 10E9/L (ref 0–0.4)
IMM GRANULOCYTES NFR BLD: 0.3 %
INTERPRETATION ECG - MUSE: NORMAL
LYMPHOCYTES # BLD AUTO: 1 10E9/L (ref 0.8–5.3)
LYMPHOCYTES NFR BLD AUTO: 13.3 %
MCH RBC QN AUTO: 32.6 PG (ref 26.5–33)
MCHC RBC AUTO-ENTMCNC: 33 G/DL (ref 31.5–36.5)
MCV RBC AUTO: 99 FL (ref 78–100)
MONOCYTES # BLD AUTO: 0.8 10E9/L (ref 0–1.3)
MONOCYTES NFR BLD AUTO: 9.8 %
NEUTROPHILS # BLD AUTO: 5.9 10E9/L (ref 1.6–8.3)
NEUTROPHILS NFR BLD AUTO: 75.9 %
NRBC # BLD AUTO: 0 10*3/UL
NRBC BLD AUTO-RTO: 0 /100
PLATELET # BLD AUTO: 67 10E9/L (ref 150–450)
POTASSIUM SERPL-SCNC: 3.4 MMOL/L (ref 3.4–5.3)
PROT SERPL-MCNC: 7.1 G/DL (ref 6.8–8.8)
RBC # BLD AUTO: 3.4 10E12/L (ref 4.4–5.9)
SODIUM SERPL-SCNC: 136 MMOL/L (ref 133–144)
WBC # BLD AUTO: 7.8 10E9/L (ref 4–11)

## 2018-10-27 PROCEDURE — 25000128 H RX IP 250 OP 636: Performed by: HOSPITALIST

## 2018-10-27 PROCEDURE — 99254 IP/OBS CNSLTJ NEW/EST MOD 60: CPT | Performed by: PSYCHIATRY & NEUROLOGY

## 2018-10-27 PROCEDURE — 80053 COMPREHEN METABOLIC PANEL: CPT | Performed by: HOSPITALIST

## 2018-10-27 PROCEDURE — 25000132 ZZH RX MED GY IP 250 OP 250 PS 637: Performed by: HOSPITALIST

## 2018-10-27 PROCEDURE — 12000000 ZZH R&B MED SURG/OB

## 2018-10-27 PROCEDURE — 25000125 ZZHC RX 250: Performed by: HOSPITALIST

## 2018-10-27 PROCEDURE — 40000007 ZZH STATISTIC ADULT CD FACE TO FACE-NO CHRG

## 2018-10-27 PROCEDURE — 85025 COMPLETE CBC W/AUTO DIFF WBC: CPT | Performed by: HOSPITALIST

## 2018-10-27 PROCEDURE — 36415 COLL VENOUS BLD VENIPUNCTURE: CPT | Performed by: HOSPITALIST

## 2018-10-27 PROCEDURE — 99232 SBSQ HOSP IP/OBS MODERATE 35: CPT | Performed by: INTERNAL MEDICINE

## 2018-10-27 RX ORDER — METRONIDAZOLE 7.5 MG/G
GEL TOPICAL EVERY MORNING
Status: DISCONTINUED | OUTPATIENT
Start: 2018-10-28 | End: 2018-10-29 | Stop reason: HOSPADM

## 2018-10-27 RX ADMIN — SODIUM CHLORIDE: 9 INJECTION, SOLUTION INTRAVENOUS at 09:45

## 2018-10-27 RX ADMIN — LEVETIRACETAM 1000 MG: 500 TABLET, FILM COATED ORAL at 23:49

## 2018-10-27 RX ADMIN — LEVETIRACETAM 1000 MG: 500 TABLET, FILM COATED ORAL at 11:54

## 2018-10-27 RX ADMIN — FOLIC ACID: 5 INJECTION, SOLUTION INTRAMUSCULAR; INTRAVENOUS; SUBCUTANEOUS at 00:02

## 2018-10-27 RX ADMIN — LEVETIRACETAM 1000 MG: 500 TABLET, FILM COATED ORAL at 00:04

## 2018-10-27 RX ADMIN — SODIUM CHLORIDE: 9 INJECTION, SOLUTION INTRAVENOUS at 18:05

## 2018-10-27 ASSESSMENT — ACTIVITIES OF DAILY LIVING (ADL)
ADLS_ACUITY_SCORE: 11
ADLS_ACUITY_SCORE: 12
ADLS_ACUITY_SCORE: 11
ADLS_ACUITY_SCORE: 12
ADLS_ACUITY_SCORE: 12
ADLS_ACUITY_SCORE: 11

## 2018-10-27 NOTE — ED NOTES
"[facility]  ED Nurse Handoff Report    ED Chief complaint: Seizures (Found down in parking lot of [employer] actively seizing, witnessed by PD. Hisotry of withdrawal seizure, father also had ETOH withdrawal siezures.)      ED Diagnosis:   Final diagnoses:   Alcohol withdrawal seizure without complication (H)       Code Status:  Admitting MD to assess.    Allergies: No Known Allergies    Activity level - Baseline/Home:  Independent    Activity Level - Current:   Stand with Assist, very tremulous and unsteady at this time.      Needed?: No    Isolation: No  Infection: Not Applicable  Bariatric?: No    Vital Signs:   Vitals:    10/26/18 2021   BP: (!) 148/108   Resp: 18   Temp: 98.1  F (36.7  C)   TempSrc: Oral   SpO2: 91%   Height: 1.981 m (6' 6\")       Cardiac Rhythm: ,        Pain level: 0-10 Pain Scale: 5    Is this patient confused?: No   [place] - Suicide Severity Rating Scale Completed?  Yes  If yes, what color did the patient score?  White    Patient Report: Initial Complaint: Seizures (Found down in parking lot of [employer] actively seizing, witnessed by PD. Hisotry of withdrawal seizure, father also had ETOH withdrawal siezures.)    Focused Assessment: Patient is alert and oriented, tremulous, stammering speech at times, vitally stable with the exception of being slightly tachycardic at times. This patient is a high falls risk and has been instructed to use the call light if he needs to get up for any reason.    Tests Performed: Blood work.   Abnormal Results:   Labs Ordered and Resulted from Time of ED Arrival Up to the Time of Departure from the ED   CBC WITH PLATELETS DIFFERENTIAL - Abnormal; Notable for the following:        Result Value    RBC Count 3.48 (*)     Hemoglobin 11.5 (*)     Hematocrit 34.4 (*)     RDW 16.1 (*)     Platelet Count 78 (*)     All other components within normal limits   BASIC METABOLIC PANEL - Abnormal; Notable for the following:     Anion Gap 18 (*) "     Glucose 187 (*)     All other components within normal limits   HEPATIC PANEL - Abnormal; Notable for the following:     Bilirubin Direct 0.4 (*)     Bilirubin Total 1.8 (*)     ALT 83 (*)      (*)     All other components within normal limits   ALCOHOL ETHYL   KEPPRA (LEVETIRACETAM) LEVEL   LIPASE   MAGNESIUM   PHOSPHORUS       Treatments provided: IV Fluid, IV Zofran, IV Ativan 1 mg x 2, and PO medications (see MAR)    Family Comments: No family present at this time.     OBS brochure/video discussed/provided to patient/family: No              Name of person given brochure if not patient: NA              Relationship to patient: NA    ED Medications:   Medications   ondansetron (ZOFRAN) 2 MG/ML injection (4 mg  Given 10/26/18 2117)   LORazepam (ATIVAN) injection 1 mg (1 mg Intravenous Given 10/26/18 2117)   multivitamin, therapeutic (THERA-VIT) tablet 1 tablet (1 tablet Oral Given 10/26/18 2117)   folic acid (FOLVITE) tablet 1 mg (1 mg Oral Given 10/26/18 2117)   thiamine tablet 100 mg (100 mg Oral Given 10/26/18 2117)   magnesium oxide (MAG-OX) tablet 800 mg (800 mg Oral Given 10/26/18 2117)   LORazepam (ATIVAN) injection 1 mg (1 mg Intravenous Given 10/26/18 2143)       Drips infusing?:  No    For the majority of the shift this patient was Green.   Interventions performed were NA.    Severe Sepsis OR Septic Shock Diagnosis Present: No    To be done/followed up on inpatient unit:  Monitor for seizure activity, MARIAHWA protocol.    ED NURSE PHONE NUMBER: [phone]

## 2018-10-27 NOTE — PLAN OF CARE
Problem: Patient Care Overview  Goal: Plan of Care/Patient Progress Review  Outcome: No Change  Patient is A&Ox4; calm and cooperative; c/o back discomfort but declined intervention; denies N/V.  HR tachy at times o/w VSS; tele ST-SR; no seizure activity/suicidal ideations; O2sats 90's RA.  Patient is tremulous - scoring 3 on CIWA.  Up with SBA.  Discharge in 24-48 hours pending clinical improvement and not requiring Ativan.

## 2018-10-27 NOTE — H&P
[facility]    History and Physical  Hospitalist       Date of Admission:  10/26/2018    Assessment & Plan   Jorge L Townsend is a 53 year old male who presents with seizure in the setting of alcohol withdrawal    Seizure, alcohol withdrawal  Delirium tremens  He takes keppra for epilepsy  Plan  - continue keppra  - CIWA with ativan, MVI  - chem dep assessment  - psych consult, he is asking for medical treatment of his addiction    Epilepsy  Has seizure previously in 2016. Was evaluated by neurology and EEG did reveal possible spike  He is not taking his keppra as prescribed  Plan- he should be advised not to drive on discharge, although this seems likely to be related to alcohol withdrawal  Rather than any underlying brain disorder      DVT Prophylaxis: Low Risk/Ambulatory with no VTE prophylaxis indicated  Code Status: Full Code    Disposition: Expected discharge in 2-5 days once withdrawal completed.    Jonatan Miller,     Primary Care Physician   Oskar Arthur    Chief Complaint   seizure    History is obtained from the patient    History of Present Illness   Jorge L Townsend is a 53 year old male who presents with alcohol withdrawal seizure. He remembers making it to [employer] buying his alcohol, but then apparently in the parking lot he was found by police and brought in by EMS. He states he has had a previous seizure and a neurologist saw him at one point. He does not take his keppra as prescribed. He further states the longest time he has remained sober was 3 years. He has 2 close friends that also drink. He states his father tried rehab multiple times but it never worked for him and he doubts that it would work for him. He denies depression. He states he has been trying to cut back on his alcohol use because it is getting difficult for him to work    Past Medical History    I have reviewed this patient's medical history and updated it with pertinent information if needed.   Past Medical  History:   Diagnosis Date     Alcohol dependence (H) 3/2/2015     Back pain      CARDIOVASCULAR SCREENING; LDL GOAL LESS THAN 160      Essential tremor      Other chronic nonalcoholic liver disease 3/2/2015     Pneumothorax     1983's       Past Surgical History   I have reviewed this patient's surgical history and updated it with pertinent information if needed.  Past Surgical History:   Procedure Laterality Date     THORACIC SURGERY         Prior to Admission Medications   Prior to Admission Medications   Prescriptions Last Dose Informant Patient Reported? Taking?   levETIRAcetam 1000 MG TABS 10/26/2018 at Unknown time Self Yes Yes   Sig: Take 1,000 mg by mouth every morning RX is for 1000 mg q12h but he takes 1000 mg daily.   metroNIDAZOLE (METROGEL) 0.75 % topical gel 10/26/2018 at Unknown time Self Yes Yes   Sig: Apply topically every morning Apply to nose       Facility-Administered Medications: None     Allergies   No Known Allergies    Social History   I have reviewed this patient's social history and updated it with pertinent information if needed. Jorge L Townsend  reports that he has quit smoking. He has never used smokeless tobacco. He reports that he drinks alcohol. He reports that he does not use illicit drugs.    Family History   I have reviewed this patient's family history and updated it with pertinent information if needed.   Family History   Problem Relation Age of Onset     Cancer Father      Cerebrovascular Disease Sister      Diabetes No family hx of      Hypertension No family hx of      Thyroid Disease No family hx of      Glaucoma No family hx of      Macular Degeneration No family hx of        Review of Systems   The 10 point Review of Systems is negative other than noted in the HPI or here.     Physical Exam   Temp: 98.1  F (36.7  C) Temp src: Oral BP: (!) 148/108   Heart Rate: 122 Resp: 18 SpO2: 91 % O2 Device: None (Room air)    Vital Signs with Ranges  Temp:  [98.1  F (36.7  C)] 98.1  F  (36.7  C)  Heart Rate:  [122] 122  Resp:  [18] 18  BP: (148)/(108) 148/108  SpO2:  [91 %] 91 %  0 lbs 0 oz    Constitutional: Awake, alert, cooperative, no apparent distress.   Eyes: Conjunctiva and pupils examined and normal.  HEENT: Moist mucous membranes, normal dentition.  Respiratory: Clear to auscultation bilaterally, no crackles or wheezing.  Cardiovascular: Regular rate and rhythm, normal S1 and S2, and no murmur noted.  GI: Soft, non-distended, non-tender, normal bowel sounds.  Lymph/Hematologic: No anterior cervical or supraclavicular adenopathy.  Skin: No rashes, no cyanosis, no edema.  Musculoskeletal: No joint swelling, erythema or tenderness.  Neurologic: slurring of words, tremor, and tongue fasciculations are present  Psychiatric: Alert, oriented to person, place and time, no obvious anxiety or depression.    Data   Data reviewed today:  I personally reviewed the EKG tracing showing ST with LVH.    Recent Labs  Lab 10/26/18  2104   WBC 6.5   HGB 11.5*   MCV 99   PLT 78*      POTASSIUM 4.3   CHLORIDE 97   CO2 20   BUN 16   CR 0.80   ANIONGAP 18*   MIKE 9.9   *   ALBUMIN 4.6   PROTTOTAL 8.3   BILITOTAL 1.8*   ALKPHOS 100   ALT 83*   *   LIPASE 219       Imaging:  No results found for this or any previous visit (from the past 24 hour(s)).

## 2018-10-27 NOTE — CONSULTS
10/27/18 chem dep consult.  Met with patient, he is not interested in treatment.  Admits he is daily drinker and will stop drinking without support.  He was dismissive of me and was not interested in conversation.  I did leave him with a business card and informed him of services [facility] offers.  I encouraged him to seek some form of support.

## 2018-10-27 NOTE — PHARMACY-ADMISSION MEDICATION HISTORY
Admission medication history interview status for the 10/26/2018  admission is complete. See EPIC admission navigator for prior to admission medications     Medication history source reliability:Moderate    Actions taken by pharmacist (provider contacted, etc):None     Additional medication history information not noted on PTA med list : He reports taking levetiracetam 1000mg once daily versus twice daily as prescribed. He denies taking any other medications.    Medication reconciliation/reorder completed by provider prior to medication history? No    Time spent in this activity: 5 min    Prior to Admission medications    Medication Sig Last Dose Taking? Auth Provider   levETIRAcetam 1000 MG TABS Take 1,000 mg by mouth every morning RX is for 1000 mg q12h but he takes 1000 mg daily. 10/26/2018 at am Yes Unknown, Entered By History   metroNIDAZOLE (METROGEL) 0.75 % topical gel Apply topically every morning Apply to nose  10/26/2018 at Unknown time Yes Unknown, Entered By History

## 2018-10-27 NOTE — PROGRESS NOTES
RECEIVING UNIT ED HANDOFF REVIEW    ED Nurse Handoff Report was reviewed by: Gricel Hampton on October 26, 2018 at 10:44 PM

## 2018-10-27 NOTE — ED PROVIDER NOTES
"  History     Chief Complaint:  Seizures     HPI   Jorge L Townsend is a 53 year old male with a history of an alcohol withdrawal seizure who presents to the emergency department today via EMS for evaluation of a seizure. EMS report that he was found actively seizing at [employer] tonight in their parking lot. Patient says his last drink was last night.  He is also complaining of low back pain, but he indicates that he has a history of low back pain.  He denies any injuries from the seizure.     Allergies:  No Known Drug Allergies      Medications:    Levetiracetam     Past Medical History:    Alcohol dependence    Alcohol withdrawal seizure    Essential tremor   Other chronic nonalcoholic liver disease   Pneumothorax     Past Surgical History:    Thoracic surgery   Nose/sinus surgery  Hand/finger surgery    Family History:    Father: Cancer  Sister: Cerebrovascular disease  No family history of diabetes, hypertension, thyroid disease, glaucoma, or macular degeneration.    Social History:  Smoking Status: Former Smoker  Smokeless Tobacco: Never Used  Alcohol Use: Positive    Daily   Marital Status: Single      Review of Systems   Musculoskeletal: Positive for back pain.   Neurological: Positive for seizures.   All other systems reviewed and are negative.    Physical Exam     Patient Vitals for the past 24 hrs:   BP Temp Temp src Heart Rate Resp SpO2 Height   10/26/18 2021 (!) 148/108 98.1  F (36.7  C) Oral 122 18 91 % 1.981 m (6' 6\")     Physical Exam  Nursing note and vitals reviewed.  Constitutional:  Oriented to person, place, and time. Cooperative.   HENT:   Nose:    Nose normal.   Mouth/Throat:   Mucous membranes are normal.   Eyes:    Conjunctivae normal and EOM are normal.      Pupils are equal, round, and reactive to light.   Neck:    Trachea normal.   Cardiovascular:  Tachycardic, regular rhythm, normal heart sounds and normal pulses. No    murmur heard.  Pulmonary/Chest:  Effort normal and breath sounds " normal.   Abdominal:   Soft. Normal appearance and bowel sounds are normal.      There is no tenderness.      There is no rebound and no CVA tenderness.   Musculoskeletal:  Extremities atraumatic x 4.   Lymphadenopathy:  No cervical adenopathy.   Neurological:   Alert and oriented to person, place, and time. Normal strength.      No cranial nerve deficit or sensory deficit. GCS eye subscore is 4. GCS verbal    subscore is 5. GCS motor subscore is 6. Tremulous and tongue    fasciculations present.   Skin:    Skin is intact. No rash noted.   Psychiatric:   Normal mood and affect.    Emergency Department Course     ECG:  ECG taken at 2033, ECG read at 2040  Sinus tachycardia with premature supraventricular complexes  Voltage criteria for left ventricular hypertrophy   Abnormal ECG  Rate 104 bpm. FL interval 152 ms. QRS duration 94 ms. QT/QTc 370/486 ms. P-R-T axes 49 74 60.    Laboratory:  Laboratory findings were communicated with the patient who voiced understanding of the findings.    CBC: WBC 6.5, HGB 11.5(L), PLT 78(L)   BMP: Anion Gap 18(H), Glucose 187(H) o/w WNL (Creatinine 0.80)  Hepatic panel: Bilirubin Direct 0.4(H), Bilirubin Total 1.8(H), ALT 83(H), (H) o/w WNL   Alcohol ethyl: <0.01  Lipase: 219  Magnesium: 1.7  Phosphorus: 3.4  Keppra level In process    Interventions:  2117 Thiamine 100 mg Oral  2117 Zofran 4 mg IV  2117 Multivitamin 1 tablet Oral  2117 Magnesium Oxide 800 mg Oral  2117 Ativan 1 mg IV  2117 Folvite 1 mg Oral  2143 Ativan 1 mg IV    Emergency Department Course:    2030 Nursing notes and vitals reviewed.    2033 EKG taken as noted above.     2102 I performed an exam of the patient as documented above.     2104 IV was inserted and blood was drawn for laboratory testing, results above.    2135 Recheck and update.      2200 I spoke with Dr. Miller of the hospitalist service from [facility] regarding patient's presentation, findings, and plan of care.    2230 I personally  reviewed the lab results with the patient and answered all related questions prior to admit.    Impression & Plan      Medical Decision Making:  Jorge L Townsend is a 53 year old male who presents to the emergency department today for evaluation of a seizure.  He has a history of alcohol withdrawal seizures in the past, however he also takes Keppra, as apparently his EEG showed the possibility of epileptic findings.  Upon arrival here, he appeared to be in pretty significant alcohol withdrawal though.  Therefore he was provided IV fluids and Ativan.  I also provided him a rally pack orally.  I checked the above blood work and his blood work is consistent with his alcohol abuse.  I think it is best that he be admitted to the hospital for further evaluation and management.  I subsequently spoke with Dr. Miller, who will be admitting the patient.    Diagnosis:    ICD-10-CM    1. Alcohol withdrawal seizure without complication (H) F10.230      Disposition:   The patient is admitted into the care of Dr. Miller.    Scribe Disclosure:  I, Anaid Marquis, am serving as a scribe at 8:31 PM on 10/26/2018 to document services personally performed by Otoniel Curry MD based on my observations and the provider's statements to me.       EMERGENCY DEPARTMENT       Otoniel Curry MD  10/26/18 [id number]

## 2018-10-27 NOTE — PROGRESS NOTES
[facility]    Hospitalist Progress Note :     Cumulative Summary: Jorge L Townsend is a 53 year old male with past medical history significant for back pain, essential tremor, other chronic nonalcoholic liver disease and alcohol dependence who was admitted on October 26 when he presented with alcohol withdrawal seizure.  Patient was admitted for further evaluation and management.  Patient has not been compliant with his Keppra and continues to consume alcohol. Of note patient was recently hospitalized last month to the hospital when he was admitted with right lung empyema and chest tube placement.    Assessment & Plan     Active Problems:   Seizure, alcohol withdrawal: On reviewing his records he was evaluated by neurology in January 2016 and was recommended to be continue on Keppra 750 mg p.o. twice daily.  Continue to monitor patient closely.  Epilepsy: Patient was evaluated by neurology in 2016 EEG revealed possible to spike he was recommended to take Keppra 750 mg p.o. twice daily, currently patient is only taking once a day.  Delirium tremens:  Patient is monitored on CIWA scores, received 2 mg of Ativan around 2100 last night.,  Patient is currently slightly tremulous but is alert awake and oriented.  Seems to have poor insight regarding his alcohol problem.    --Continue to monitor patient closely.  --Continue serial monitoring with Ativan  --Continue multivitamin, folic acid and thiamine.  --Continue patient on Keppra 1000 mg p.o. twice daily.  --Consult regarding patient consuming quite dose of antiseizure medications.  --Patient was also seen by chemical dependency this morning but refused and told the chemical dependence and dependency that he would be able to quit alcohol on its own.  --Patient was also evaluated by psych this morning.  --Continue patient on IV fluids.  --Hopefully if patient remains a stable and does not require further dosages operating and then probably can be discharged  tomorrow otherwise possibly on Monday.  --Continue patient on electrolyte replacement protocol.    DVT Prophylaxis: Pneumatic Compression Devices and Anti-embolisim stockings (TEDs)  Code Status: Full Code    Disposition: Expected discharge in 24- 48 hours depending upon patient,s clinical improvement and not requiring further Ativan.    Karin Fofana MD, FACP  Text Page (7am - 6pm)      Interval History   Patient was seen and examined, sitting in bed, looks disheveled, denying any chest pain, shortness of breath or palpitations, told me about his recent admission last month secondary to lung empyema.  Patient is denying any fever or chills, shortness of breath at this point.  When discussed about cessation from alcohol consumption, patient told me that he has enough social support and he would be able to quit on his own.  Seems to have poor insight regarding his alcohol problem.    -Data reviewed today: I reviewed all new labs and imaging results over the last 24 hours.    I personally reviewed no images or EKG's today.    Physical Exam   Temp: 97.7  F (36.5  C) Temp src: Oral BP: 119/78 Pulse: 87 Heart Rate: 99 Resp: 18 SpO2: 95 % O2 Device: Nasal cannula Oxygen Delivery: 1.5 LPM  There were no vitals filed for this visit.  Vital Signs with Ranges  Temp:  [97.7  F (36.5  C)-98.6  F (37  C)] 97.7  F (36.5  C)  Pulse:  [] 87  Heart Rate:  [] 99  Resp:  [18] 18  BP: (119-148)/() 119/78  SpO2:  [87 %-97 %] 95 %  I/O last 3 completed shifts:  In: 727 [I.V.:727]  Out: 500 [Urine:500]    GENERAL: Alert , awake and oriented. NAD. Conversational, appropriate.   HEENT: Normocephalic. EOMI. No icterus or injection. Nares normal.   LUNGS: Clear to auscultation. No dyspnea at rest.   HEART: Regular rate. Extremities perfused.   ABDOMEN: Soft, nontender, and nondistended. Positive bowel sounds.   EXTREMITIES: No LE edema noted.   NEUROLOGIC: Moves extremities x4 on command. No acute focal neurologic  abnormalities noted.     Medications     sodium chloride 100 mL/hr at 10/27/18 0945       folic acid  1 mg Oral Daily     levETIRAcetam  1,000 mg Oral Q12H     [START ON 10/28/2018] metroNIDAZOLE   Topical QAM     multivitamin, therapeutic with minerals  1 tablet Oral Daily     thiamine  100 mg Oral Daily       Data     Recent Labs  Lab 10/27/18  0932 10/26/18  2104   WBC 7.8 6.5   HGB 11.1* 11.5*   MCV 99 99   PLT 67* 78*    135   POTASSIUM 3.4 4.3   CHLORIDE 100 97   CO2 26 20   BUN 12 16   CR 0.61* 0.80   ANIONGAP 10 18*   MIKE 8.8 9.9   GLC 91 187*   ALBUMIN 3.8 4.6   PROTTOTAL 7.1 8.3   BILITOTAL 2.0* 1.8*   ALKPHOS 81 100   ALT 67 83*   * 163*   LIPASE  --  219       Imaging:   No results found for this or any previous visit (from the past 24 hour(s)).

## 2018-10-27 NOTE — PLAN OF CARE
Problem: Patient Care Overview  Goal: Plan of Care/Patient Progress Review  Outcome: No Change  Pt A&Ox4. Pt sats dropped to 87% on RA when sleeping, placed on 1.5 L NC, 94-96% on 1.5 L NC. Tachy (100-115) at start of shift. Other VSS. Denied pain. Up with assist x1. CIWA 4, 3 for mild to moderate tremors. Seizure precuations. Tele: NSR-ST. LS diminished. CD and Psych consults.    Admission    Patient arrives to room 634-1 via cart from ED  Care plan note: See above    Inpatient nursing criteria listed below were met:    PCD's Documented: Yes  Skin issues/needs documented :NA  Isolation education started/completed NA  Patient allergies verified with patient: Yes  Verified completion of [place] Risk Assessment Tool:  Yes  Verified completion of Guardianship screening tool: Yes  Fall Prevention: Care plan updated, Education given and documented Yes  Care Plan initiated: Yes  Home medications documented in belongings flowsheet: NA  Patient belongings documented in belongings flowsheet: Yes  Reminder note (belongings/ medications) placed in discharge instructions:NA  Admission profile/ required documentation complete: Yes

## 2018-10-27 NOTE — CONSULTS
"[facility] Initial Psychiatric Consult Note      TIME SPENT IN PSYCHIATRY INITIAL CONSULT: 55 MINUTES    Consult ordered by: Jonatan Miller DO  Reason: Addiction medication recommendations.     Initial History     The patient's care was discussed, patient seen and chart notes were reviewed.    Patient examined for psychiatric consultation.     IDENTIFICATION    Pt is a 53 year old male. Pt sees PCP Oskar Lebron. Pt seen on 10/26/18 for addiction medication recommendations.    HISTORY OF PRESENT ILLNESS    Mr. Jorge L Townsend is a 53 year old male who presents with seizure in the setting of alcohol withdrawal. Patient is currently on Keppra for epilepsy. Patient notes that prior to his current seizure, the patient lowered his Keppra dosage and experienced a seizure.    When inquired about his drinking habit, the patient denies that he has a drinking problem, despite previous hospital admissions that report elevated liver function and alcohol related withdrawal seizures. Patient has a long history of alcohol withdrawal seizures, non-compliance with his Keppra. Patient was supposed to be taking 2 doses, but was only taking one which led to his hospitalization. Patient denies all symptoms of depression, including depressed mood, motivation poor, concentration poor, low energy, hopeless, and helpless. Patient denies SI, has no plan, able to contract for safety.        CHEMICAL DEPENDENCY HISTORY    Patient has a history of alcohol abuse and alcohol related withdrawal seizures. Patient notes that he has previously used chemicals like marijuana, \"years ago\".Patient on several previous exams has minimized his alcohol usage stating he only consumes two drinks, despite requiring Ativan for detox. The patient has elevated liver functions. The patient's  AST was 163, his ALT was 83, and his Bilirubin was 1.8, all of which are high.   Patient has a history of two previous DUI's. Patient states that his father went " "through multiple treatments for alcohol use and \"it never did any good.\"  so patient holds the belief that treatment will not work for him.       PAST PSYCHIATRIC HISTORY    The patient was previous diagnosed with alochol use disorder and alcohol related withdrawal seizures by Dr. Owusu, Psychiatrist. When seen by Dr. Owusu on 16, the patient was not willing to disclose any concerns about depression, anxiety, obsessive-compulsive disorder, ledy, psychosis or past psychiatric treatment. Patient is on Keppra for epilepsy.     FAMILY HISTORY    The patient notes that both his father and his mother had addictions to alcohol.     SOCIAL HISTORY    The patient is from the [facility].  He lives in [place]. 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  He has never been , does not have children and has 1 brother.  His mother and father are .  His mom  in the last year and he was living with her.  It sounds like he had a sister who  in her 20's.He says his father went through multiple treatments for alcohol use and \"it never did any good.\"            Medications     Prescriptions Prior to Admission   Medication Sig Dispense Refill Last Dose     levETIRAcetam 1000 MG TABS Take 1,000 mg by mouth every morning RX is for 1000 mg q12h but he takes 1000 mg daily.   10/26/2018 at am     metroNIDAZOLE (METROGEL) 0.75 % topical gel Apply topically every morning Apply to nose    10/26/2018 at Unknown time       Scheduled Medications    folic acid  1 mg Oral Daily     levETIRAcetam  1,000 mg Oral Q12H     multivitamin, therapeutic with minerals  1 tablet Oral Daily     thiamine  100 mg Oral Daily     PRNs:  LORazepam **OR** LORazepam, magnesium sulfate, melatonin, naloxone, ondansetron **OR** ondansetron, potassium chloride, potassium chloride with lidocaine, potassium chloride, potassium chloride, potassium chloride, potassium phosphate (KPHOS) in D5W IV, potassium phosphate (KPHOS) in D5W " "IV, potassium phosphate (KPHOS) in D5W IV, potassium phosphate (KPHOS) in D5W IV      Allergies      No Known Allergies     Previous Medical History     Past Medical History:   Diagnosis Date     Alcohol dependence (H) 3/2/2015     Back pain      CARDIOVASCULAR SCREENING; LDL GOAL LESS THAN 160      Essential tremor      Other chronic nonalcoholic liver disease 3/2/2015     Pneumothorax         Medical Review of Systems     /82 (BP Location: Right arm)  Pulse 89  Temp 98.6  F (37  C) (Oral)  Resp 18  Ht 1.981 m (6' 6\")  SpO2 93%  BMI 19.18 kg/m2  Body mass index is 19.18 kg/(m^2).    Previous 10-point ROS completed by Jonatan Miller DO on 10/26/18 reviewed by Jean Odell MD on October 27, 2018 and is unchanged except for those problems mentioned within the HPI.      Mental Status Examination     Appearance Lying in bed, dressed in gown. Appears stated age.   Attitude Poor, barely cooperative   Orientation Oriented to person, place, time   Eye Contact Poor   Speech Regular rate, rhythm, volume and tone   Language Normal   Psychomotor Behavior Normal   Thought Process Goal-Oriented, Intact   Associations Intact   Thought Content Patient is currently negative for suicidal ideation, negative for plan or intent, able to contract no self harm and identify barriers to suicide.  Negative for obsessions, compulsions or psychosis.     Mood Flat, depressive, irritable   Affect Flat, not open   Fund of Knowledge Impaired   Insight Impaired   Judgement Impaired   Attention Span & Concentration Impaired   Recent & Remote Memory Intact   Gait Not tested   Muscle Tone Intact      Labs     Labs reviewed.  Recent Results (from the past 24 hour(s))   EKG 12-lead, tracing only    Collection Time: 10/26/18  8:33 PM   Result Value Ref Range    Interpretation ECG Click View Image link to view waveform and result    CBC with platelets differential    Collection Time: 10/26/18  9:04 PM   Result Value Ref Range    " WBC 6.5 4.0 - 11.0 10e9/L    RBC Count 3.48 (L) 4.4 - 5.9 10e12/L    Hemoglobin 11.5 (L) 13.3 - 17.7 g/dL    Hematocrit 34.4 (L) 40.0 - 53.0 %    MCV 99 78 - 100 fl    MCH 33.0 26.5 - 33.0 pg    MCHC 33.4 31.5 - 36.5 g/dL    RDW 16.1 (H) 10.0 - 15.0 %    Platelet Count 78 (L) 150 - 450 10e9/L    Diff Method Automated Method     % Neutrophils 61.1 %    % Lymphocytes 21.2 %    % Monocytes 15.8 %    % Eosinophils 0.3 %    % Basophils 0.5 %    % Immature Granulocytes 1.1 %    Nucleated RBCs 0 0 /100    Absolute Neutrophil 4.0 1.6 - 8.3 10e9/L    Absolute Lymphocytes 1.4 0.8 - 5.3 10e9/L    Absolute Monocytes 1.0 0.0 - 1.3 10e9/L    Absolute Eosinophils 0.0 0.0 - 0.7 10e9/L    Absolute Basophils 0.0 0.0 - 0.2 10e9/L    Abs Immature Granulocytes 0.1 0 - 0.4 10e9/L    Absolute Nucleated RBC 0.0    Basic metabolic panel    Collection Time: 10/26/18  9:04 PM   Result Value Ref Range    Sodium 135 133 - 144 mmol/L    Potassium 4.3 3.4 - 5.3 mmol/L    Chloride 97 94 - 109 mmol/L    Carbon Dioxide 20 20 - 32 mmol/L    Anion Gap 18 (H) 3 - 14 mmol/L    Glucose 187 (H) 70 - 99 mg/dL    Urea Nitrogen 16 7 - 30 mg/dL    Creatinine 0.80 0.66 - 1.25 mg/dL    GFR Estimate >90 >60 mL/min/1.7m2    GFR Estimate If Black >90 >60 mL/min/1.7m2    Calcium 9.9 8.5 - 10.1 mg/dL   Hepatic panel    Collection Time: 10/26/18  9:04 PM   Result Value Ref Range    Bilirubin Direct 0.4 (H) 0.0 - 0.2 mg/dL    Bilirubin Total 1.8 (H) 0.2 - 1.3 mg/dL    Albumin 4.6 3.4 - 5.0 g/dL    Protein Total 8.3 6.8 - 8.8 g/dL    Alkaline Phosphatase 100 40 - 150 U/L    ALT 83 (H) 0 - 70 U/L     (H) 0 - 45 U/L   Alcohol ethyl    Collection Time: 10/26/18  9:04 PM   Result Value Ref Range    Ethanol g/dL <0.01 <0.01 g/dL   Lipase    Collection Time: 10/26/18  9:04 PM   Result Value Ref Range    Lipase 219 73 - 393 U/L   Magnesium    Collection Time: 10/26/18  9:04 PM   Result Value Ref Range    Magnesium 1.7 1.6 - 2.3 mg/dL   Phosphorus    Collection Time:  10/26/18  9:04 PM   Result Value Ref Range    Phosphorus 3.4 2.5 - 4.5 mg/dL        Impression     This is a 53 year old male with a history of alcohol related withdrawal seizures. The patient is currently taking Keppra for epilepsy, but was only taking one dose of his medication instead of two which led to his hospitalization. The patient denies symptoms of depression and states that he retains adeuquate energy, motivation, and concentration. When inquired about his drinking, the patient denies that he has a drinking problem, expressing impaired insight into his chemical addiction. Patient is not holdable at this point. Dr. Odell reminds the patient that drinking alcohol lowers the seizure threshold and that he should abstain from alcohol and continue to take his seizure medications. Will not start the patient on medication at this point.      Diagnoses     1. Depression, NOS  2. Alcohol use disorder  3. Alcohol related withdrawal seizures     Plan     1. Explained side effects, benefits and complications of medications to the patient.  2. Medication Changes: No medication change at this point.  3. Discussed treatment plan with patient and team.    TIME SPENT IN PSYCHIATRY INITIAL CONSULT: 55 MINUTES     Attestation:   Patient has been seen and evaluated by me, Jean Odell MD.    Patient ID:  Name: Jorge L Townsend MRN: [id number]  Admission: 10/26/2018 YOB: 1965

## 2018-10-28 LAB
ALBUMIN SERPL-MCNC: 3.4 G/DL (ref 3.4–5)
ALP SERPL-CCNC: 85 U/L (ref 40–150)
ALT SERPL W P-5'-P-CCNC: 130 U/L (ref 0–70)
AST SERPL W P-5'-P-CCNC: 331 U/L (ref 0–45)
BILIRUB DIRECT SERPL-MCNC: 0.5 MG/DL (ref 0–0.2)
BILIRUB SERPL-MCNC: 1.9 MG/DL (ref 0.2–1.3)
LEVETIRACETAM SERPL-MCNC: 11 UG/ML (ref 12–46)
PROT SERPL-MCNC: 6.7 G/DL (ref 6.8–8.8)

## 2018-10-28 PROCEDURE — 36415 COLL VENOUS BLD VENIPUNCTURE: CPT | Performed by: INTERNAL MEDICINE

## 2018-10-28 PROCEDURE — 12000000 ZZH R&B MED SURG/OB

## 2018-10-28 PROCEDURE — 25000132 ZZH RX MED GY IP 250 OP 250 PS 637: Performed by: HOSPITALIST

## 2018-10-28 PROCEDURE — 99232 SBSQ HOSP IP/OBS MODERATE 35: CPT | Performed by: INTERNAL MEDICINE

## 2018-10-28 PROCEDURE — 25000128 H RX IP 250 OP 636: Performed by: HOSPITALIST

## 2018-10-28 PROCEDURE — 80076 HEPATIC FUNCTION PANEL: CPT | Performed by: INTERNAL MEDICINE

## 2018-10-28 RX ADMIN — SODIUM CHLORIDE: 9 INJECTION, SOLUTION INTRAVENOUS at 04:14

## 2018-10-28 RX ADMIN — LEVETIRACETAM 1000 MG: 500 TABLET, FILM COATED ORAL at 23:01

## 2018-10-28 RX ADMIN — MULTIPLE VITAMINS W/ MINERALS TAB 1 TABLET: TAB at 09:14

## 2018-10-28 RX ADMIN — Medication 100 MG: at 09:14

## 2018-10-28 RX ADMIN — FOLIC ACID 1 MG: 1 TABLET ORAL at 09:14

## 2018-10-28 RX ADMIN — LEVETIRACETAM 1000 MG: 500 TABLET, FILM COATED ORAL at 11:27

## 2018-10-28 ASSESSMENT — ACTIVITIES OF DAILY LIVING (ADL)
ADLS_ACUITY_SCORE: 11

## 2018-10-28 NOTE — PLAN OF CARE
Problem: Patient Care Overview  Goal: Plan of Care/Patient Progress Review  Outcome: No Change  VSS on RA, up SBA, A&Ox4. Tolerating reg diet. IVF infusing. Neuros intact. CIWAs 0 this shift. Voiding appropriately. Continue to monitor.

## 2018-10-28 NOTE — PLAN OF CARE
Problem: Patient Care Overview  Goal: Plan of Care/Patient Progress Review  Outcome: Improving  Patient is A&Ox4; calm and cooperative; scoring 2 on CIWA r/t mild tremors.  VSS; tele NSR; ambulating independently in room/halls.  Possible discharge tomorrow.

## 2018-10-28 NOTE — CONSULTS
"BRIEF NUTRITION ASSESSMENT      REASON FOR ASSESSMENT:  Admission Screen - Unintentional weight loss of 10# or more in past 2 months    NUTRITION HISTORY:  Patient reports that he has been eating well prior to admit.  He added that he had a collapsed lung back in September and had a decreased appetite and intake at that time, however it has since improved.  He states that he is not much of a breakfast eater and does not take any liquid nutritional supplements at home.     CURRENT DIET AND INTAKE:  Diet:  Regular diet               Patient reports that he has been eating 100% of meals  Dinner - Pizza and milk x 2  Lunch - Eggs, milk x 2, chairez x 2, and OJ    ANTHROPOMETRICS:  Height: 6'6\"  Weight: None recorded since admit despite order for daily weights -- Patient reports that he was down to 166# in September but has been gaining weight back towards his usual weight of 180-185#  IBW:  97.3 kg   Weight Status: Unable to determine without a current weight   Weight History: See above - patient states that he has actually been gaining weight since September      LABS:  Labs noted    MALNUTRITION:  Patient does not meet two of the following criteria necessary for diagnosing malnutrition: significant weight loss, reduced intake, subcutaneous fat loss, muscle loss or fluid retention    NUTRITION INTERVENTION:  Nutrition Diagnosis:  No nutrition diagnosis at this time.    Implementation:  Nutrition Education:  Per Provider order if indicated.  Medical food supplement therapy:  Ordered Boost BID between meals.    FOLLOW UP/MONITORING:   Will re-evaluate in 7 - 10 days, or sooner, if re-consulted.    Holly Glover RD, LD, CNSC   Clinical Dietitian - [facility]           "

## 2018-10-28 NOTE — PROGRESS NOTES
[facility]    Hospitalist Progress Note :     Cumulative Summary: Jorge L Townsend is a 53 year old male with past medical history significant for back pain, essential tremor, other chronic nonalcoholic liver disease and alcohol dependence who was admitted on October 26 when he presented with alcohol withdrawal seizure.  Patient was admitted for further evaluation and management.  Patient has not been compliant with his Keppra and continues to consume alcohol. Of note patient was recently hospitalized last month to the hospital when he was admitted with right lung empyema and chest tube placement.  Patient was a started on Keppra .    Assessment & Plan     Active Problems:   Seizure, alcohol withdrawal: On reviewing his records he was evaluated by neurology in January 2016 and was recommended to be continue on Keppra 750 mg p.o. twice daily.  Continue to monitor patient closely.  Epilepsy: Patient was evaluated by neurology in 2016 EEG revealed possible to spike he was recommended to take Keppra 750 mg p.o. twice daily, currently patient is only taking once a day.discussed with patient regarding the importance of taking Keppra twice   Delirium tremens:  Patient is monitored on CIWA scores, received 2 mg of Ativan around 2100 last night.,  Patient is currently slightly tremulous but is alert awake and oriented.  Seems to have poor insight regarding his alcohol problem.    --Continue to monitor patient closely.  --Continue serial monitoring with Ativan, not requiring further Ativan  -- will discontinue telemetry and continues  pulse oximetry   --Continue multivitamin, folic acid and thiamine.  --Continue patient on Keppra 1000 mg p.o. twice daily.  --Consult regarding patient consuming right dose of antiseizure medications.  --Patient was also seen by chemical dependency this but refused and told the chemical dependency that he would be able to quit alcohol on its own.  --Patient was also evaluated by  psych this morning, patient is not hold able at this point and did not recommend starting patient on any medications   -- Discontinue IV fluids.  -- elevated LFts , most likely sec to alcoholism , will repeat tomorrow   --Continue patient on electrolyte replacement protocol.  -- Discussed with patient regarding discharge  He feels week and would like to increase his mobilization and does not think he is ready for discharge today.    DVT Prophylaxis: Pneumatic Compression Devices and Anti-embolisim stockings (TEDs)  Code Status: Full Code    Disposition: Expected discharge tomorrow , increase mobilization    Karin Fofana MD, FACP  Text Page (7am - 6pm)      Interval History   Patient was seen and examined, sitting in bed, denying any chest pain, shortness of breath or palpitations, told me about his recent admission last month secondary to lung empyema.  Patient is denying any fever or chills, shortness of breath at this point.  When discussed about cessation from alcohol consumption, patient told me that he has enough social support and he would be able to quit on his own.  Seems to have poor insight regarding his alcohol problem.Would like to start walking today and thinks that he will be able to leave today.    -Data reviewed today: I reviewed all new labs and imaging results over the last 24 hours.    I personally reviewed no images or EKG's today.    Physical Exam   Temp: 98.5  F (36.9  C) Temp src: Oral BP: (!) 128/94 Pulse: 88 Heart Rate: 88 Resp: 16 SpO2: 94 % O2 Device: None (Room air)    There were no vitals filed for this visit.  Vital Signs with Ranges  Temp:  [98.4  F (36.9  C)-99  F (37.2  C)] 98.5  F (36.9  C)  Pulse:  [88-96] 88  Heart Rate:  [88-90] 88  Resp:  [16-18] 16  BP: (126-134)/(88-99) 128/94  SpO2:  [94 %-95 %] 94 %  I/O last 3 completed shifts:  In: 1833 [I.V.:1833]  Out: 1200 [Urine:1200]    GENERAL: Alert , awake and oriented. NAD. Conversational, appropriate.   HEENT: Normocephalic.  EOMI. No icterus or injection. Nares normal.   LUNGS: Clear to auscultation. No dyspnea at rest.   HEART: Regular rate. Extremities perfused.   ABDOMEN: Soft, nontender, and nondistended. Positive bowel sounds.   EXTREMITIES: No LE edema noted.   NEUROLOGIC: Moves extremities x4 on command. No acute focal neurologic abnormalities noted.     Medications       folic acid  1 mg Oral Daily     levETIRAcetam  1,000 mg Oral Q12H     metroNIDAZOLE   Topical QAM     multivitamin, therapeutic with minerals  1 tablet Oral Daily     thiamine  100 mg Oral Daily       Data     Recent Labs  Lab 10/28/18  0952 10/27/18  0932 10/26/18  2104   WBC  --  7.8 6.5   HGB  --  11.1* 11.5*   MCV  --  99 99   PLT  --  67* 78*   NA  --  136 135   POTASSIUM  --  3.4 4.3   CHLORIDE  --  100 97   CO2  --  26 20   BUN  --  12 16   CR  --  0.61* 0.80   ANIONGAP  --  10 18*   MIKE  --  8.8 9.9   GLC  --  91 187*   ALBUMIN 3.4 3.8 4.6   PROTTOTAL 6.7* 7.1 8.3   BILITOTAL 1.9* 2.0* 1.8*   ALKPHOS 85 81 100   * 67 83*   * 137* 163*   LIPASE  --   --  219       Imaging:   No results found for this or any previous visit (from the past 24 hour(s)).

## 2018-10-29 ENCOUNTER — PATIENT OUTREACH (OUTPATIENT)
Dept: CARE COORDINATION | Facility: CLINIC | Age: 53
End: 2018-10-29

## 2018-10-29 VITALS
WEIGHT: 154.32 LBS | RESPIRATION RATE: 16 BRPM | HEART RATE: 92 BPM | BODY MASS INDEX: 17.86 KG/M2 | HEIGHT: 78 IN | TEMPERATURE: 98.3 F | OXYGEN SATURATION: 97 % | DIASTOLIC BLOOD PRESSURE: 83 MMHG | SYSTOLIC BLOOD PRESSURE: 119 MMHG

## 2018-10-29 LAB
MAGNESIUM SERPL-MCNC: 1.8 MG/DL (ref 1.6–2.3)
PHOSPHATE SERPL-MCNC: 4.8 MG/DL (ref 2.5–4.5)
POTASSIUM SERPL-SCNC: 3.9 MMOL/L (ref 3.4–5.3)

## 2018-10-29 PROCEDURE — 84100 ASSAY OF PHOSPHORUS: CPT | Performed by: INTERNAL MEDICINE

## 2018-10-29 PROCEDURE — 83735 ASSAY OF MAGNESIUM: CPT | Performed by: INTERNAL MEDICINE

## 2018-10-29 PROCEDURE — 25000132 ZZH RX MED GY IP 250 OP 250 PS 637: Performed by: HOSPITALIST

## 2018-10-29 PROCEDURE — 99239 HOSP IP/OBS DSCHRG MGMT >30: CPT | Performed by: INTERNAL MEDICINE

## 2018-10-29 PROCEDURE — 84132 ASSAY OF SERUM POTASSIUM: CPT | Performed by: INTERNAL MEDICINE

## 2018-10-29 PROCEDURE — 36415 COLL VENOUS BLD VENIPUNCTURE: CPT | Performed by: INTERNAL MEDICINE

## 2018-10-29 PROCEDURE — 25000132 ZZH RX MED GY IP 250 OP 250 PS 637: Performed by: INTERNAL MEDICINE

## 2018-10-29 RX ORDER — LANOLIN ALCOHOL/MO/W.PET/CERES
100 CREAM (GRAM) TOPICAL DAILY
Qty: 30 TABLET | Refills: 0 | Status: SHIPPED | OUTPATIENT
Start: 2018-10-30 | End: 2020-01-09

## 2018-10-29 RX ORDER — FOLIC ACID 1 MG/1
1 TABLET ORAL DAILY
Qty: 30 TABLET | Refills: 0 | Status: SHIPPED | OUTPATIENT
Start: 2018-10-30 | End: 2020-01-09

## 2018-10-29 RX ORDER — MULTIPLE VITAMINS W/ MINERALS TAB 9MG-400MCG
1 TAB ORAL DAILY
Qty: 30 EACH | Refills: 0 | Status: SHIPPED | OUTPATIENT
Start: 2018-10-30 | End: 2020-01-09

## 2018-10-29 RX ADMIN — Medication 100 MG: at 08:23

## 2018-10-29 RX ADMIN — METRONIDAZOLE: 7.5 GEL TOPICAL at 11:03

## 2018-10-29 RX ADMIN — FOLIC ACID 1 MG: 1 TABLET ORAL at 08:23

## 2018-10-29 RX ADMIN — MULTIPLE VITAMINS W/ MINERALS TAB 1 TABLET: TAB at 08:23

## 2018-10-29 RX ADMIN — LEVETIRACETAM 1000 MG: 500 TABLET, FILM COATED ORAL at 11:02

## 2018-10-29 ASSESSMENT — ACTIVITIES OF DAILY LIVING (ADL)
ADLS_ACUITY_SCORE: 11

## 2018-10-29 NOTE — PLAN OF CARE
Problem: Patient Care Overview  Goal: Plan of Care/Patient Progress Review  Outcome: Adequate for Discharge Date Met: 10/29/18      Discharged to home accompanied by friend; declined to have wheelchair ride to door.  Does not have a neurologist and states did not go due to insurance coverage.  He will address with Dr. Arthur on his visit on Monday to the clinic; he states Dr. Arthur wrote the last Rx for Keppra.  CIWA scores negative, has a baseline tremor.    Discharge    Patient discharged to home via private car with friend  Care plan note:  See above    Listed belongings gathered and returned to patient. Yes  Care Plan and Patient education resolved: Yes  Prescriptions if needed, hard copies sent with patient  Filled Rx of vitamins given to patient  Home and hospital acquired medications returned to patient: No  Medication Bin checked and emptied on discharge Yes  Follow up appointment made for patient: Yes

## 2018-10-29 NOTE — DISCHARGE SUMMARY
[facility]    Discharge Summary  Hospitalist    Date of Admission:  10/26/2018  Date of Discharge:  10/29/2018  Discharging Provider: Stephen Willis DO    Discharge Diagnoses   1. Seizure, alcohol withdrawal vs epilepsy  2. Epilepsy  3. Tremor, possibly essential vs withdrawal   4. Alcohol abuse     History of Present Illness from Dr. Miller's H&P on 10/26/18  Jorge L Townsend is a 53 year old male who presents with alcohol withdrawal seizure. He remembers making it to [employer] buying his alcohol, but then apparently in the parking lot he was found by police and brought in by EMS. He states he has had a previous seizure and a neurologist saw him at one point. He does not take his keppra as prescribed. He further states the longest time he has remained sober was 3 years. He has 2 close friends that also drink. He states his father tried rehab multiple times but it never worked for him and he doubts that it would work for him. He denies depression. He states he has been trying to cut back on his alcohol use because it is getting difficult for him to work    Hospital Course   Jorge L Townsend was admitted on 10/26/2018.  The following problems were addressed during his hospitalization:    Patient presented with a seizure.  Likely etiology was either alcohol withdrawal as the patient drinks daily or subtherapeutic keppra.  Patient has a history of epilepsy and was to be on Keppra BID but had only been taking it daily.  While here the patient did not have any further seizures and was mentating well at discharge.  He refused a chemical dependency evaluation and stated he had good support so did not feel he needed help.  CIWAs were consistently 2 due to a baseline tremor that he states is normal and he did not require any Ativan.  Was given IV vitamins while here and these will be switched to PO at discharge.       Stephen Willis DO    Significant Results and Procedures   See below     Pending  Results   No pending results  Unresulted Labs Ordered in the Past 30 Days of this Admission     No orders found from 8/27/2018 to 10/27/2018.          Code Status   Full Code       Primary Care Physician   Oskar Arthur    Physical Exam   Temp: 98.3  F (36.8  C) Temp src: Oral BP: 119/83 Pulse: 92 Heart Rate: 80 Resp: 16 SpO2: 97 % O2 Device: None (Room air)    Vitals:    10/29/18 0621   Weight: 70 kg (154 lb 5.2 oz)     Vital Signs with Ranges  Temp:  [98.3  F (36.8  C)-99.2  F (37.3  C)] 98.3  F (36.8  C)  Pulse:  [92] 92  Heart Rate:  [] 80  Resp:  [16] 16  BP: (116-136)/(78-93) 119/83  SpO2:  [94 %-97 %] 97 %  I/O last 3 completed shifts:  In: 360 [P.O.:360]  Out: 1025 [Urine:1025]    Constitutional: Resting comfortably.  NAD  Eyes: EOMI  ENT: Moist mucous membranes   Respiratory: Clear to auscultation.  No respiratory distress  Cardiovascular: RRR.  No murmurs  GI: Bowel sounds present  Musculoskeletal: No edema   Neurologic: Fine resting tremor    Discharge Disposition   Discharged to home  Condition at discharge: Stable    Consultations This Hospital Stay   PSYCHIATRY IP CONSULT  CHEMICAL DEPENDENCY IP CONSULT    Time Spent on this Encounter   IStephen, personally saw the patient today and spent greater than 30 minutes discharging this patient.    Discharge Orders     Reason for your hospital stay   Seizure, alcohol withdrawal vs subtherapeutic Keppra     Follow-up and recommended labs and tests    Follow up with primary care provider, Oskar Arthur, within 7 days to evaluate medication change and for hospital follow- up.  The following labs/tests are recommended: keppra level.    Patient should also follow up with his neurologist within the next 4 weeks     Activity   Your activity upon discharge: activity as tolerated     Diet   Follow this diet upon discharge: Orders Placed This Encounter     Snacks/Supplements Adult: Boost Plus; Between Meals     Combination Diet Regular Diet Adult        Discharge Medications   Current Discharge Medication List      START taking these medications    Details   folic acid (FOLVITE) 1 MG tablet Take 1 tablet (1 mg) by mouth daily  Qty: 30 tablet, Refills: 0    Comments: Future refills by PCP Dr. Oskar Arthur with phone number [phone].  Associated Diagnoses: Alcohol withdrawal seizure without complication (H)      multivitamin, therapeutic with minerals (THERA-VIT-M) TABS tablet Take 1 tablet by mouth daily  Qty: 30 each, Refills: 0    Comments: Future refills by PCP Dr. Oskar Arthur with phone number [phone].  Associated Diagnoses: Alcohol withdrawal seizure without complication (H)      thiamine 100 MG tablet Take 1 tablet (100 mg) by mouth daily  Qty: 30 tablet, Refills: 0    Comments: Future refills by PCP Dr. Oskar Arthur with phone number [phone].  Associated Diagnoses: Alcohol withdrawal seizure without complication (H)         CONTINUE these medications which have NOT CHANGED    Details   levETIRAcetam 1000 MG TABS Take 1,000 mg by mouth every morning RX is for 1000 mg q12h but he takes 1000 mg daily.      metroNIDAZOLE (METROGEL) 0.75 % topical gel Apply topically every morning Apply to nose            Allergies   No Known Allergies  Data   Most Recent 3 CBC's:  Recent Labs   Lab Test  10/27/18   0932  10/26/18   2104  09/12/18   1405   WBC  7.8  6.5  5.9   HGB  11.1*  11.5*  12.4*   MCV  99  99  99   PLT  67*  78*  388      Most Recent 3 BMP's:  Recent Labs   Lab Test  10/29/18   0847  10/27/18   0932  10/26/18   2104  09/12/18   1405   NA   --   136  135  140   POTASSIUM  3.9  3.4  4.3  5.3   CHLORIDE   --   100  97  104   CO2   --   26  20  27   BUN   --   12  16  9   CR   --   0.61*  0.80  0.67   ANIONGAP   --   10  18*  9   MIKE   --   8.8  9.9  8.9   GLC   --   91  187*  94     Most Recent 2 LFT's:  Recent Labs   Lab Test  10/28/18   0952  10/27/18   0932   AST  331*  137*   ALT  130*  67   ALKPHOS  85  81   BILITOTAL  1.9*  2.0*     Most  Recent INR's and Anticoagulation Dosing History:  Anticoagulation Dose History     Recent Dosing and Labs Latest Ref Rng & Units 9/28/2006 8/5/2018 9/6/2018    INR 0.86 - 1.14 0.99 0.95 1.05        Most Recent 3 Troponin's:No lab results found.  Most Recent Cholesterol Panel:  Recent Labs   Lab Test  01/22/15   1026   CHOL  184   LDL  61   HDL  112   TRIG  55     Most Recent 6 Bacteria Isolates From Any Culture (See EPIC Reports for Culture Details):  Recent Labs   Lab Test  09/06/18   1605  09/05/18   2030   CULT  No growth  Canceled, Test credited  Duplicate request    No growth  No growth     Most Recent TSH, T4 and A1c Labs:  Recent Labs   Lab Test  02/12/15   1550   TSH  2.72     Results for orders placed or performed during the hospital encounter of 09/05/18   CT Chest w Contrast    Narrative    CT CHEST WITH CONTRAST September 6, 2018 10:42 AM     HISTORY: Empyema.    TECHNIQUE: 80mL Isovue-370. Radiation dose for this scan was reduced  using automated exposure control, adjustment of the mA and/or kV  according to patient size, or iterative reconstruction technique.    COMPARISON: 8/14/2018.    FINDINGS: There is a large right hydropneumothorax. Portions of the  pleural fluid appear loculated superiorly. No evidence of tension. No  pericardial or left pleural effusion. The lungs are emphysematous. No  discrete pulmonary nodule or mass demonstrated. Prominent mediastinal  lymph nodes are unchanged. No hilar or axillary adenopathy. Images  through the upper abdomen demonstrate fatty infiltration of the liver.      Impression    IMPRESSION: Large right-sided hydropneumothorax. Please see separate  report describing CT-guided chest tube placement.    CLIVE JALLOH MD   CT Chest Tube with Cath Placement    Narrative    CT GUIDED CHEST TUBE PLACEMENT 9/6/2018 11:19 AM     HISTORY: Right hydropneumothorax.     COMPARISON: None.     TECHNIQUE: Volumetric helical acquisition of CT images without  contrast.  Radiation dose for this scan was reduced using automated  exposure control, adjustment of the mA and/or kV according to patient  size, or iterative reconstruction technique.    MEDICATIONS:  4 mL Lidocaine 1% locally, RN Beth Boone, 10 minutes  sedation time, 1mg Versed, 50 mcg Fentanyl.    TECHNIQUE: Informed consent was obtained from the patient. A timeout  was performed. Patient received conscious sedation with Versed and  fentanyl with constant monitoring by the physician and nursing staff.  Total sedation time was 10 minutes. The patient was scanned in supine  position for procedural planning. The skin was prepped and draped in a  sterile manner, and anesthetized with 1% lidocaine. The pleural space  was accessed using trocar technique and a 12 Sinhala locking pigtail  catheter was advanced into the pleural space and its position verified  with imaging. The catheter was secured to the skin using an adhesive  bandage. There were no immediate complications and patient left the CT  suite in satisfactory condition.     FINDINGS: Limited CT images redemonstrate a large right  hydropneumothorax. Subsequent images demonstrate CT-guided chest tube  placement.      Impression    IMPRESSION: Successful CT guided right pigtail chest tube placement.    CLIVE JALLOH MD   XR Chest Port 1 View    Narrative    CHEST ONE VIEW PORTABLE   9/7/2018 5:16 AM     HISTORY:  Chest tube placement.     COMPARISON: Chest CT 9/6/2018.       Impression    IMPRESSION:   1. Right basilar chest tube is in place. Right-sided hydropneumothorax  is markedly decreased in size compared to previous chest CT.  Persistent loculated right apical pneumothorax.  2. Surgical staple lines at the lung apices are again seen. Patchy  opacities at the right lung base could represent scarring or  atelectasis, although infiltrate would be difficult to exclude. Lungs  remain hyperinflated. Cardiac silhouette is unchanged.    LOLY NORRIS MD   XR Chest Port  1 View    Narrative    XR CHEST PORT ONE VIEW  9/8/2018 5:56 AM     HISTORY: Chest tube placement.     COMPARISON: 9/7/2018.      Impression    IMPRESSION: Right basilar chest tube remains in place. Small right  apical pneumothorax is again seen and not significantly changed. No  definite left pneumothorax. Surgical staple lines at the lung apices  are again noted. Streaky right basilar opacity is similar to prior. No  definite new infiltrate.    LOLY NORRIS MD   XR Chest Port 1 View    Narrative    PORTABLE CHEST ONE VIEW   9/9/2018 6:03 AM    HISTORY: Chest tube placement.    COMPARISON: 9/8/2018.      Impression    IMPRESSION: A chest tube is again seen in the right chest base. A very  small right apical pneumothorax persists. Surgical clips are seen  throughout the right upper chest. No other abnormality is seen. I see  no definite change since the previous examination.     ELEONORA BRUCE MD

## 2018-10-29 NOTE — PROGRESS NOTES
Clinic Care Coordination Contact  Care Team Conversations    Patient is admitted.   Clinic Care Coordinator RN will review chart on the following day for discharge plan.

## 2018-10-29 NOTE — PLAN OF CARE
Problem: Patient Care Overview  Goal: Plan of Care/Patient Progress Review  A&Ox4.  VSS, on RA.  Denies pain.  CIWA 2 and 2 for tremor, pt states this tremor is baseline from previous injury.  Up independently in room.  LS diminished.  Plan for possible D/C today, nursing will continue to monitor.

## 2018-10-31 ENCOUNTER — PATIENT OUTREACH (OUTPATIENT)
Dept: CARE COORDINATION | Facility: CLINIC | Age: 53
End: 2018-10-31

## 2018-11-05 ENCOUNTER — OFFICE VISIT (OUTPATIENT)
Dept: FAMILY MEDICINE | Facility: CLINIC | Age: 53
End: 2018-11-05
Payer: COMMERCIAL

## 2018-11-05 VITALS
WEIGHT: 171 LBS | HEIGHT: 78 IN | SYSTOLIC BLOOD PRESSURE: 112 MMHG | OXYGEN SATURATION: 96 % | BODY MASS INDEX: 19.78 KG/M2 | TEMPERATURE: 97.3 F | HEART RATE: 107 BPM | DIASTOLIC BLOOD PRESSURE: 78 MMHG

## 2018-11-05 DIAGNOSIS — G25.0 ESSENTIAL TREMOR: Primary | ICD-10-CM

## 2018-11-05 DIAGNOSIS — G40.909 SEIZURE DISORDER (H): ICD-10-CM

## 2018-11-05 DIAGNOSIS — F43.21 ADJUSTMENT DISORDER WITH DEPRESSED MOOD: ICD-10-CM

## 2018-11-05 DIAGNOSIS — F10.29 ALCOHOL DEPENDENCE WITH UNSPECIFIED ALCOHOL-INDUCED DISORDER (H): ICD-10-CM

## 2018-11-05 LAB
ERYTHROCYTE [DISTWIDTH] IN BLOOD BY AUTOMATED COUNT: 14.6 % (ref 10–15)
HCT VFR BLD AUTO: 37.9 % (ref 40–53)
HGB BLD-MCNC: 12.8 G/DL (ref 13.3–17.7)
MCH RBC QN AUTO: 34.4 PG (ref 26.5–33)
MCHC RBC AUTO-ENTMCNC: 33.8 G/DL (ref 31.5–36.5)
MCV RBC AUTO: 102 FL (ref 78–100)
PLATELET # BLD AUTO: 333 10E9/L (ref 150–450)
RBC # BLD AUTO: 3.72 10E12/L (ref 4.4–5.9)
WBC # BLD AUTO: 5.5 10E9/L (ref 4–11)

## 2018-11-05 PROCEDURE — 85027 COMPLETE CBC AUTOMATED: CPT | Performed by: FAMILY MEDICINE

## 2018-11-05 PROCEDURE — 36415 COLL VENOUS BLD VENIPUNCTURE: CPT | Performed by: FAMILY MEDICINE

## 2018-11-05 PROCEDURE — 99214 OFFICE O/P EST MOD 30 MIN: CPT | Performed by: FAMILY MEDICINE

## 2018-11-05 PROCEDURE — 80076 HEPATIC FUNCTION PANEL: CPT | Performed by: FAMILY MEDICINE

## 2018-11-05 RX ORDER — LEVETIRACETAM 1000 MG/1
1000 TABLET ORAL EVERY MORNING
Qty: 60 TABLET | Refills: 0 | COMMUNITY
Start: 2018-11-05 | End: 2018-12-27

## 2018-11-05 NOTE — PROGRESS NOTES
"  SUBJECTIVE:   Jorge L Townsend is a 53 year old male who presents to clinic today for the following health issues:          Hospital Follow-up Visit:    Hospital/Nursing Home/IP Rehab Facility: [facility]  Date of Admission: 10/26/2018  Date of Discharge: 10/29/2018  Reason(s) for Admission: Alcohol Withdrawal - seizure            Problems taking medications regularly:  None       Medication changes since discharge: None       Problems adhering to non-medication therapy:  None    Summary of hospitalization:  [facility] discharge summary reviewed  Diagnostic Tests/Treatments reviewed.  Follow up needed: none  Other Healthcare Providers Involved in Patient s Care: is in the process of making an appointment with neurology         None  Update since discharge: states that his back hurts alot     Post Discharge Medication Reconciliation: discharge medications reconciled, continue medications without change.  Plan of care communicated with patient     Coding guidelines for this visit:  Type of Medical   Decision Making Face-to-Face Visit       within 7 Days of discharge Face-to-Face Visit        within 14 days of discharge   Moderate Complexity 24065 [id number]   High Complexity 14515 61818                  Problem list and histories reviewed & adjusted, as indicated.  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     Other chronic nonalcoholic liver disease     Alcohol dependence (H)     Alcohol withdrawal seizure     Seizure (H)     Empyema lung (H)     Past Surgical History:   Procedure Laterality Date     THORACIC SURGERY         Social History   Substance Use Topics     Smoking status: Former Smoker     Smokeless tobacco: Never Used      Comment: quit 2003     Alcohol use Yes      Comment: \"2-3 straight vodkas nightly \"      Family " History   Problem Relation Age of Onset     Cancer Father      Cerebrovascular Disease Sister      Diabetes No family hx of      Hypertension No family hx of      Thyroid Disease No family hx of      Glaucoma No family hx of      Macular Degeneration No family hx of          Current Outpatient Prescriptions   Medication Sig Dispense Refill     levETIRAcetam 1000 MG TABS Take 1,000 mg by mouth every morning RX is for 1000 mg q12h but he takes 1000 mg daily. 60 tablet 0     folic acid (FOLVITE) 1 MG tablet Take 1 tablet (1 mg) by mouth daily 30 tablet 0     metroNIDAZOLE (METROGEL) 0.75 % topical gel Apply topically every morning Apply to nose        multivitamin, therapeutic with minerals (THERA-VIT-M) TABS tablet Take 1 tablet by mouth daily 30 each 0     thiamine 100 MG tablet Take 1 tablet (100 mg) by mouth daily 30 tablet 0     [DISCONTINUED] levETIRAcetam 1000 MG TABS Take 1,000 mg by mouth every morning RX is for 1000 mg q12h but he takes 1000 mg daily.       No Known Allergies  Recent Labs   Lab Test  10/29/18   0847  10/28/18   0952  10/27/18   0932  10/26/18   2104   02/12/15   1550   01/22/15   1026   LDL   --    --    --    --    --    --    --   61   HDL   --    --    --    --    --    --    --   112   TRIG   --    --    --    --    --    --    --   55   ALT   --   130*  67  83*   < >   --    < >  196*   CR   --    --   0.61*  0.80   < >   --    < >  0.66   GFRESTIMATED   --    --   >90  >90   < >   --    < >  >90  Non  GFR Calc     GFRESTBLACK   --    --   >90  >90   < >   --    < >  >90   GFR Calc     POTASSIUM  3.9   --   3.4  4.3   < >   --    < >  4.1   TSH   --    --    --    --    --   2.72   --    --     < > = values in this interval not displayed.      BP Readings from Last 3 Encounters:   11/05/18 112/78   10/29/18 119/83   09/12/18 103/73    Wt Readings from Last 3 Encounters:   11/05/18 171 lb (77.6 kg)   10/29/18 154 lb 5.2 oz (70 kg)   09/12/18 166 lb (75.3  "kg)                    Reviewed and updated as needed this visit by clinical staff       Reviewed and updated as needed this visit by Provider         ROS:  Constitutional, HEENT, cardiovascular, pulmonary, gi and gu systems are negative, except as otherwise noted.    OBJECTIVE:     /78 (BP Location: Right arm, Cuff Size: Adult Regular)  Pulse 107  Temp 97.3  F (36.3  C) (Oral)  Ht 6' 6\" (1.981 m)  Wt 171 lb (77.6 kg)  SpO2 96%  BMI 19.76 kg/m2  Body mass index is 19.76 kg/(m^2).  GENERAL: healthy, alert and no distress  NECK: no adenopathy, no asymmetry, masses, or scars and thyroid normal to palpation  RESP: lungs clear to auscultation - no rales, rhonchi or wheezes  CV: regular rate and rhythm, normal S1 S2, no S3 or S4, no murmur, click or rub, no peripheral edema and peripheral pulses strong  ABDOMEN: soft, nontender, no hepatosplenomegaly, no masses and bowel sounds normal  MS: no gross musculoskeletal defects noted, no edema        ASSESSMENT/PLAN:   ASSESSMENT / PLAN:  (G25.0) Essential tremor  (primary encounter diagnosis)  Comment: has been stable, need to keep taking vitamin as prescribed and keep working on sober  Plan: CBC with platelets, Hepatic panel (Albumin,         ALT, AST, Bili, Alk Phos, TP)            (G40.909) Alcohol withdrawal seizure  Comment: stable, keep taking kepprea bid, encouraged him to schedule with neurology as referred   Plan: CBC with platelets, Hepatic panel (Albumin,         ALT, AST, Bili, Alk Phos, TP), MENTAL HEALTH         REFERRAL  - Adult; Assessments and Testing;         General Psychological Testing; [facility]: [facility] [phone]; We will         contact you to schedule the appointment or         please call with any questions            (F10.29) Alcohol dependence with unspecified alcohol-induced disorder (H)  Comment: mentioned above   Plan: CBC with platelets, Hepatic panel (Albumin,         ALT, AST, Bili, Alk Phos, TP), MENTAL " HEALTH         REFERRAL  - Adult; Assessments and Testing;         General Psychological Testing; [facility]: [facility] [phone]; We will         contact you to schedule the appointment or         please call with any questions            (F43.21) Adjustment disorder with depressed mood  Comment: has been worsening with alcohol dependency, need further assessment and start treatment if indicated, referral made   Plan: MENTAL HEALTH REFERRAL  - Adult; Assessments         and Testing; General Psychological Testing;         [facility]: [facility] [phone]; We will contact you to schedule the         appointment or please call with any questions            FUTURE APPOINTMENTS:       - Follow-up visit in 4 months for general health check     sOkar Arthur MD  [facility] [place]

## 2018-11-05 NOTE — MR AVS SNAPSHOT
After Visit Summary   11/5/2018    Jorge L Townsend    MRN: [id number]           Patient Information     Date Of Birth          1965        Visit Information        Provider Department      11/5/2018 12:40 PM Oskar Arthur MD [facility] [place]        Today's Diagnoses     Essential tremor    -  1    Alcohol withdrawal seizure        Alcohol dependence with unspecified alcohol-induced disorder (H)        Adjustment disorder with depressed mood           Follow-ups after your visit        Additional Services     MENTAL HEALTH REFERRAL  - Adult; Assessments and Testing; General Psychological Testing; [facility]: [facility] [phone]; We will contact you to schedule the appointment or please call with any questions       All scheduling is subject to the client's specific insurance plan & benefits, provider/location availability, and provider clinical specialities.  Please arrive 15 minutes early for your first appointment and bring your completed paperwork.    Please be aware that coverage of these services is subject to the terms and limitations of your health insurance plan.  Call member services at your health plan with any benefit or coverage questions.                            Follow-up notes from your care team     Return in about 4 months (around 3/5/2019) for Physical Exam, Lab Work, Routine Visit, med check.      Who to contact     If you have questions or need follow up information about today's clinic visit or your schedule please contact [facility] [place] directly at [phone].  Normal or non-critical lab and imaging results will be communicated to you by MyChart, letter or phone within 4 business days after the clinic has received the results. If you do not hear from us within 7 days, please contact the clinic through MyChart or phone. If you have a critical or abnormal lab result, we will notify you by phone as soon as possible.  Submit refill  "requests through [employer] or call your pharmacy and they will forward the refill request to us. Please allow 3 business days for your refill to be completed.          Additional Information About Your Visit        [facility] Information     [employer] gives you secure access to your electronic health record. If you see a primary care provider, you can also send messages to your care team and make appointments. If you have questions, please call your primary care clinic.  If you do not have a primary care provider, please call [phone] and they will assist you.        Care EveryWhere ID     This is your Care EveryWhere ID. This could be used by other organizations to access your [facility] medical records  [id number]        Your Vitals Were     Pulse Temperature Height Pulse Oximetry BMI (Body Mass Index)       107 97.3  F (36.3  C) (Oral) 6' 6\" (1.981 m) 96% 19.76 kg/m2        Blood Pressure from Last 3 Encounters:   11/05/18 112/78   10/29/18 119/83   09/12/18 103/73    Weight from Last 3 Encounters:   11/05/18 171 lb (77.6 kg)   10/29/18 154 lb 5.2 oz (70 kg)   09/12/18 166 lb (75.3 kg)              We Performed the Following     CBC with platelets     Hepatic panel (Albumin, ALT, AST, Bili, Alk Phos, TP)     MENTAL HEALTH REFERRAL  - Adult; Assessments and Testing; General Psychological Testing; [facility]: [facility] [phone]; We will contact you to schedule the appointment or please call with any questions        Primary Care Provider Office Phone # Fax #    Oskar SIENA Arthur [phone]       [address] [facility] [address]        Equal Access to Services     YAYA HUYNH : Hadii milli Lin, waaxda tierra, qaybta gia kaur. So [facility] [phone].    ATENCIÓN: Si habla español, tiene a pleitez disposición servicios gratuitos de asistencia lingüística. Llame al [phone].    We comply with applicable federal " civil rights laws and [place] laws. We do not discriminate on the basis of race, color, national origin, age, disability, sex, sexual orientation, or gender identity.            Thank you!     Thank you for choosing [facility] [place]  for your care. Our goal is always to provide you with excellent care. Hearing back from our patients is one way we can continue to improve our services. Please take a few minutes to complete the written survey that you may receive in the mail after your visit with us. Thank you!             Your Updated Medication List - Protect others around you: Learn how to safely use, store and throw away your medicines at www.disposemymeds.org.          This list is accurate as of 11/5/18  1:03 PM.  Always use your most recent med list.                   Brand Name Dispense Instructions for use Diagnosis    folic acid 1 MG tablet    FOLVITE    30 tablet    Take 1 tablet (1 mg) by mouth daily    Alcohol withdrawal seizure without complication (H)       levETIRAcetam 1000 MG Tabs     60 tablet    Take 1,000 mg by mouth every morning RX is for 1000 mg q12h but he takes 1000 mg daily.        metroNIDAZOLE 0.75 % topical gel    METROGEL     Apply topically every morning Apply to nose        multivitamin, therapeutic with minerals Tabs tablet     30 each    Take 1 tablet by mouth daily    Alcohol withdrawal seizure without complication (H)       thiamine 100 MG tablet     30 tablet    Take 1 tablet (100 mg) by mouth daily    Alcohol withdrawal seizure without complication (H)

## 2018-11-06 LAB
ALBUMIN SERPL-MCNC: 4.2 G/DL (ref 3.4–5)
ALP SERPL-CCNC: 101 U/L (ref 40–150)
ALT SERPL W P-5'-P-CCNC: 145 U/L (ref 0–70)
AST SERPL W P-5'-P-CCNC: 116 U/L (ref 0–45)
BILIRUB DIRECT SERPL-MCNC: 0.3 MG/DL (ref 0–0.2)
BILIRUB SERPL-MCNC: 1 MG/DL (ref 0.2–1.3)
PROT SERPL-MCNC: 8 G/DL (ref 6.8–8.8)

## 2019-01-03 ENCOUNTER — MYC REFILL (OUTPATIENT)
Dept: FAMILY MEDICINE | Facility: CLINIC | Age: 54
End: 2019-01-03

## 2019-01-03 DIAGNOSIS — R56.9 SEIZURE (H): ICD-10-CM

## 2019-01-03 RX ORDER — LEVETIRACETAM 1000 MG/1
1000 TABLET ORAL EVERY MORNING
Qty: 60 TABLET | Refills: 0 | Status: CANCELLED | OUTPATIENT
Start: 2019-01-03

## 2019-01-03 NOTE — TELEPHONE ENCOUNTER
"Telephone Information:   Mobile [phone]       Patient called and left a non detailed message.     -Patient just got refill on 12/30/18. Wondering if they wanted a pharmacy change.  Jo Ann SHAIKH, RN   [facility]         levETIRAcetam (KEPPRA) 1000 MG tablet    Last Written Prescription Date:  12/30/18  Last Fill Quantity: 60,  # refills: 0   Last office visit: 11/5/2018 with prescribing provider:  Future Office Visit:      Requested Prescriptions   Pending Prescriptions Disp Refills     levETIRAcetam (KEPPRA) 1000 MG tablet 60 tablet 0     Sig: Take 1 tablet (1,000 mg) by mouth every morning RX is for 1000 mg q12h but he takes 1000 mg daily.    Anti-Seizure Meds Protocol  Failed - 1/3/2019  3:23 PM       Failed - Review Authorizing provider's last note.     Refer to last progress notes: confirm request is for original authorizing provider (cannot be through other providers).         Failed - Normal ALT or AST on file in past 26 months    Recent Labs   Lab Test 11/05/18  1300   *     Recent Labs   Lab Test 11/05/18  1300   *            Passed - Recent (12 mo) or future (30 days) visit within the authorizing provider's specialty    Patient had office visit in the last 12 months or has a visit in the next 30 days with authorizing provider or within the authorizing provider's specialty.  See \"Patient Info\" tab in inbasket, or \"Choose Columns\" in Meds & Orders section of the refill encounter.             Passed - Normal CBC on file in past 26 months    Recent Labs   Lab Test 11/05/18  1300   WBC 5.5   RBC 3.72*   HGB 12.8*   HCT 37.9*                   Passed - Normal platelet count on file in past 26 months    Recent Labs   Lab Test 11/05/18  1300                              "

## 2019-01-08 NOTE — TELEPHONE ENCOUNTER
Garima - caregiver called back. Informed rx was sent to pharmacy, does not need another refill.   Marlene Kerns RN   [facility] - Triage

## 2019-01-14 ENCOUNTER — TELEPHONE (OUTPATIENT)
Dept: FAMILY MEDICINE | Facility: CLINIC | Age: 54
End: 2019-01-14

## 2019-01-14 NOTE — TELEPHONE ENCOUNTER
S/w Charlie pharmacist at [facility].  They need clarification on the sig for keppra.  Is the pt to take 1 tab q morning or every 12 hours?    Charlie can be reached at [phone].  Pharmacy pended.    Debora ROUSSEAU RN  EP Triage

## 2019-01-15 NOTE — TELEPHONE ENCOUNTER
Called [phone]    Informed [facility] that Keppra should be every 12 hours per Dr. Arthur.    Jo Ann NOWAKN, RN   [facility]

## 2019-01-15 NOTE — TELEPHONE ENCOUNTER
Left message on Garima Whitt's cell [phone] (CTC in chart) to call clinic for below message about Keppra and pharmacy informed.    Debora ROUSSEAU RN  EP Triage

## 2019-01-16 NOTE — TELEPHONE ENCOUNTER
S/w Garima and advised Keppra is to be taken q 12 hours or twice a day.  These directions were called to the [facility] pharmacist in [place] on 1/15/19 and rx should be ready to  today.    Garima states understanding and will let pt know to take bid.    Debora ROUSSEAU RN  EP Triage

## 2019-02-04 ENCOUNTER — ALLIED HEALTH/NURSE VISIT (OUTPATIENT)
Dept: NEUROLOGY | Facility: CLINIC | Age: 54
End: 2019-02-04
Payer: COMMERCIAL

## 2019-02-04 ENCOUNTER — OFFICE VISIT (OUTPATIENT)
Dept: NEUROLOGY | Facility: CLINIC | Age: 54
End: 2019-02-04
Payer: COMMERCIAL

## 2019-02-04 VITALS
WEIGHT: 182 LBS | BODY MASS INDEX: 21.03 KG/M2 | TEMPERATURE: 98.8 F | HEART RATE: 92 BPM | DIASTOLIC BLOOD PRESSURE: 92 MMHG | SYSTOLIC BLOOD PRESSURE: 154 MMHG

## 2019-02-04 DIAGNOSIS — G40.909 RECURRENT SEIZURES (H): Primary | ICD-10-CM

## 2019-02-04 DIAGNOSIS — R56.9 SEIZURE (H): ICD-10-CM

## 2019-02-04 DIAGNOSIS — R56.9 SEIZURES (H): Primary | ICD-10-CM

## 2019-02-04 RX ORDER — LEVETIRACETAM 1000 MG/1
1500 TABLET ORAL 2 TIMES DAILY
Qty: 90 TABLET | Refills: 11 | Status: SHIPPED | OUTPATIENT
Start: 2019-02-04 | End: 2019-02-06

## 2019-02-04 ASSESSMENT — PAIN SCALES - GENERAL: PAINLEVEL: NO PAIN (0)
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
## 2019-02-04 NOTE — PATIENT INSTRUCTIONS
Times of Days am pm        Medication Tablet Size Number of Tablets/Capsules Total Daily Dosage    Keppra 1000 1.5 1.5       3000 mg                                                                                                                                   Carry this with you at all times.  CONTINUE TAKING YOUR OTHER MEDICATIONS AS PREVIOUSLY DIRECTED.      * * *Do not store medications in the bathroom.  Keep medications away from children!* * *
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
## 2019-02-05 NOTE — PROCEDURES
Procedure Date: 2019      EEG #:  [id number]      DATE OF RECORDIN2019      DURATION OF RECORDIN hours 21 minutes      CLINICAL HISTORY:  This patient is a 53-year-old male with a past medical history of essential tremor, back pain, chronic alcohol use and chronic nonalcoholic liver disease who presents with multiple seizures.  It is unclear whether or not these seizures are all related to alcohol withdrawal.  EEG was requested for further evaluation.      CURRENT MEDICATIONS:  Keppra.      TECHNICAL SUMMARY: This continuous video- EEG monitoring procedure was performed with 23 scalp electrodes in 10-20 electrode system placements, and additional scalp, precordial and other surface electrodes used for electrical referencing and artifact detection.  Video monitoring was utilized and periodically reviewed by EEG technologists and the physician for electroclinical correlations.    RESULTS:   BACKGROUND ACTIVITIES: During maximal wakefulness, there is a symmetric, moderate amplitude, well formed, approximately 10 Hz posterior dominant rhythm, which attenuated with eye opening. Stage I and II sleep were recorded with well formed sleep architectures including vertex sharp transients and sleep spindles. Hyperventilation produced no change of the background activities.  Photic stimulation produced no driving responses.  INTERICTAL ACTIVITIES: There are no focal pathological slowing or epileptiform abnormalities.   ICTAL ACTIVITIES: There are no clinical or electrographic seizures during this recording.  IMPRESSION:  This is a normal waking and sleep EEG.          DENITA GASTELUM MD             D: 2019   T: 2019   MT: damion      Name:     ALON OLIVER   MRN:      [id number]        Account:        [id number]   :      1965           Procedure Date: 2019      Document: [id number]

## 2019-02-06 DIAGNOSIS — R56.9 SEIZURE (H): ICD-10-CM

## 2019-02-06 NOTE — TELEPHONE ENCOUNTER
"Script clarification on the Keppra. Need clarification on directions \"1.5 tabs BID or 1 tab BID?\"  "

## 2019-02-08 RX ORDER — LEVETIRACETAM 1000 MG/1
1500 TABLET ORAL 2 TIMES DAILY
Qty: 90 TABLET | Refills: 11 | Status: SHIPPED | OUTPATIENT
Start: 2019-02-08 | End: 2019-03-28

## 2019-02-14 DIAGNOSIS — R56.9 SEIZURE (H): ICD-10-CM

## 2019-02-14 NOTE — TELEPHONE ENCOUNTER
Routing refill request to provider for review/approval because:  Labs not current:  See below.    Kendra TubbsRN BSN  [facility]  [phone]

## 2019-02-14 NOTE — TELEPHONE ENCOUNTER
"Requested Prescriptions   Pending Prescriptions Disp Refills     levETIRAcetam (KEPPRA) 1000 MG tablet [Pharmacy Med Name: LEVETIRACETAM 1,000 MG TABLET] 60 tablet 0     Sig: TAKE 1 TABLET BY MOUTH EVERY 12 HOURS    Anti-Seizure Meds Protocol  Failed - 2/14/2019  1:17 AM       Failed - Review Authorizing provider's last note.     Refer to last progress notes: confirm request is for original authorizing provider (cannot be through other providers).         Failed - Normal ALT or AST on file in past 26 months    Recent Labs   Lab Test 11/05/18  1300   *     Recent Labs   Lab Test 11/05/18  1300   *            Passed - Recent (12 mo) or future (30 days) visit within the authorizing provider's specialty    Patient had office visit in the last 12 months or has a visit in the next 30 days with authorizing provider or within the authorizing provider's specialty.  See \"Patient Info\" tab in inbasket, or \"Choose Columns\" in Meds & Orders section of the refill encounter.             Passed - Normal CBC on file in past 26 months    Recent Labs   Lab Test 11/05/18  1300   WBC 5.5   RBC 3.72*   HGB 12.8*   HCT 37.9*                   Passed - Normal platelet count on file in past 26 months    Recent Labs   Lab Test 11/05/18  1300                 Passed - Medication is active on med list        levETIRAcetam (KEPPRA) 1000 MG tablet 90 tablet 11 2/8/2019       Last Written Prescription Date:  02/08/2019  Last Fill Quantity: 90,  # refills: 11  Last office visit: 11/5/2018 with prescribing provider: Dr. Arthur   Future Office Visit:  Unknown     "

## 2019-02-14 NOTE — TELEPHONE ENCOUNTER
Please send the refill request to neurology the patient was seen recently,   They might  adjust his dosage of medicine.

## 2019-02-26 ENCOUNTER — OFFICE VISIT (OUTPATIENT)
Dept: NEUROLOGY | Facility: CLINIC | Age: 54
End: 2019-02-26
Payer: COMMERCIAL

## 2019-02-26 DIAGNOSIS — F09 MENTAL DISORDER DUE TO GENERAL MEDICAL CONDITION: ICD-10-CM

## 2019-02-26 DIAGNOSIS — R56.9 SEIZURE (H): Primary | ICD-10-CM

## 2019-02-26 DIAGNOSIS — F41.9 ANXIETY: ICD-10-CM

## 2019-02-26 NOTE — PROGRESS NOTES
Patient was seen for neuropsychological evaluation at the request of Dr. Elicia Ewing, for the purposes of diagnostic clarification and treatment planning.  2 hrs 10 min of test administration and scoring were provided by this writer, Apolonia Jacobs.  Please see Dr. Jonatan Munroe's report for a full interpretation of the findings.
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
## 2019-03-04 RX ORDER — LEVETIRACETAM 1000 MG/1
TABLET ORAL
Qty: 60 TABLET | Refills: 0 | OUTPATIENT
Start: 2019-03-04
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
## 2019-03-04 NOTE — PROGRESS NOTES
__ Orientation            Time          __-5___        Place        ___1____        Personal Info.     ____4____    WAIS-IV   FSIQ____VCI_____PRI_____ WMI__PSI____       Raw  Age SS  __Similarities  __21___ __8___  __Vocabulary  _______ _______  __Information  _______          _______  __Comprehension _______ _______  __Block Design  __24__  __7__  __Matrix Reas.  __12____ __8____  __Visual Puzzles _______ _______  __Picture Comp. _______ _______  __Figure Weights _______ _______  __Digit Span  __29___ __11___  __Arithmetic  __ 9                     __6___  __L-N Sequencing _______ _______  __Symbol Search __18___ __6___  __Coding  ______  _____  __Cancellation  _______ _______          HVLT-R  Trial   1 2    3     Total                  6             9              11                26                                                 Raw  T  Immediate Recall            26                         46      Delayed Recall                  8                        39      Retention (%)                    73                      33                          Rec/Dis Inc.  # Hits      12      #FPs     0                58                BVMT-R  Trial   1   2     3       Total                  7               10             11               28                                               Raw             T            Immediate Recall            28              59           Delayed Recall                   12               66           Retention (%)                    109          >16  Rec/Dis Inc.  # Hits       6     #FPs     0              >16    __Rey-Osterreith/Jessie Complex Figure Test    Raw  T-score        %ile  Copy   _22.0__         -       ?1   Recognition __143 __           -      >16    __WRAT-4   Reading SS = 99   %ile = 47    GE = 12.7    __COWAT   Raw__20__ Tscore__32___   __Semantic Fluency/Animals   Raw = 12  Tscore = 34  __BNT   Raw = 56/60 T-Score = 50  __Complex Ideational Material   Raw = 11/12 Tscore =  46    __Trail Making Test   A =   37         Errors = 0    Tscore = 50   B =  112        Errors = 0    Tscore = 39  __Stroop                       Raw   +    Ganesh   =  Total    T-Score           Word = _74_      __8__  __82__  __37_        Color =  _59_      __4__  __63__  _   39__        C/W   =  _35_       __ 5__    _40__   _   45__    __JoLO   Raw = 24+2 %ile = 56   __Benton Facial Recognition   Raw = 47  Interp: WNL    __Grooved Pegboard   RH = 110          Tscore = 32      Drops = 0   LH =  108          TScore = 36      Drops = 1    __BDI-II   Raw_12_ Interp.__ Minimal___   __BAI     Raw_18_ Interp. __Moderate___    __WMS-III   LM1 =39  SS = 11   LM2 = 29  SS = 14   LM2R = 29 Zscore = 1.45   Spat. Span = 14  SS = 10    __TOMM   Trial 1 = 50

## 2019-03-04 NOTE — TELEPHONE ENCOUNTER
Dose was increased on 2/4/19 to 1500 mg BID. Rx sent to [facility] on [address] in [place].

## 2019-03-28 DIAGNOSIS — R56.9 SEIZURE (H): ICD-10-CM

## 2019-03-28 RX ORDER — LEVETIRACETAM 1000 MG/1
TABLET ORAL
Qty: 180 TABLET | Refills: 5 | Status: SHIPPED | OUTPATIENT
Start: 2019-03-28 | End: 2019-05-13

## 2019-03-28 NOTE — TELEPHONE ENCOUNTER
Notes from pharmacy:    Patient is requesting a new rx. Please send new rx for keppra 500 MG. 100 MG are on nationwide backorder.     Ray Inman MA on 3/28/2019 at 9:02 AM

## 2019-04-30 ENCOUNTER — DOCUMENTATION ONLY (OUTPATIENT)
Dept: OTHER | Facility: CLINIC | Age: 54
End: 2019-04-30

## 2019-05-13 ENCOUNTER — TELEPHONE (OUTPATIENT)
Dept: NEUROLOGY | Facility: CLINIC | Age: 54
End: 2019-05-13

## 2019-05-13 ENCOUNTER — OFFICE VISIT (OUTPATIENT)
Dept: NEUROLOGY | Facility: CLINIC | Age: 54
End: 2019-05-13
Payer: COMMERCIAL

## 2019-05-13 VITALS
TEMPERATURE: 97.3 F | HEART RATE: 123 BPM | DIASTOLIC BLOOD PRESSURE: 78 MMHG | SYSTOLIC BLOOD PRESSURE: 116 MMHG | BODY MASS INDEX: 20.59 KG/M2 | WEIGHT: 178.2 LBS

## 2019-05-13 DIAGNOSIS — R56.9 SEIZURE (H): ICD-10-CM

## 2019-05-13 RX ORDER — LEVETIRACETAM 1000 MG/1
1500 TABLET ORAL 2 TIMES DAILY
Qty: 90 TABLET | Refills: 11 | Status: SHIPPED | OUTPATIENT
Start: 2019-05-13 | End: 2019-11-18

## 2019-05-13 ASSESSMENT — PAIN SCALES - GENERAL: PAINLEVEL: EXTREME PAIN (8)
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
## 2019-05-13 NOTE — TELEPHONE ENCOUNTER
DMV form completed while pt was in clinic. Completed form faxed and mailed to the DMV. Copy sent to pt and to scanning.
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
## 2019-05-15 LAB — LEVETIRACETAM SERPL-MCNC: 53 UG/ML (ref 12–46)

## 2019-11-18 ENCOUNTER — OFFICE VISIT (OUTPATIENT)
Dept: NEUROLOGY | Facility: CLINIC | Age: 54
End: 2019-11-18
Payer: COMMERCIAL

## 2019-11-18 VITALS
SYSTOLIC BLOOD PRESSURE: 141 MMHG | DIASTOLIC BLOOD PRESSURE: 98 MMHG | WEIGHT: 193.6 LBS | HEART RATE: 91 BPM | BODY MASS INDEX: 22.37 KG/M2

## 2019-11-18 DIAGNOSIS — R56.9 SEIZURE (H): ICD-10-CM

## 2019-11-18 RX ORDER — LEVETIRACETAM 1000 MG/1
1500 TABLET ORAL 2 TIMES DAILY
Qty: 270 TABLET | Refills: 3 | Status: SHIPPED | OUTPATIENT
Start: 2019-11-18

## 2019-11-18 ASSESSMENT — PATIENT HEALTH QUESTIONNAIRE - PHQ9: SUM OF ALL RESPONSES TO PHQ QUESTIONS 1-9: 0

## 2019-11-18 ASSESSMENT — PAIN SCALES - GENERAL: PAINLEVEL: NO PAIN (0)
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
## 2019-11-19 ENCOUNTER — TELEPHONE (OUTPATIENT)
Dept: NEUROLOGY | Facility: CLINIC | Age: 54
End: 2019-11-19

## 2019-11-19 NOTE — TELEPHONE ENCOUNTER
DMV form completed during office visit on 11/18/19. Form faxed and copy sent and mailed to the patient.

## 2019-12-15 ENCOUNTER — HEALTH MAINTENANCE LETTER (OUTPATIENT)
Age: 54
End: 2019-12-15

## 2020-01-09 ENCOUNTER — APPOINTMENT (OUTPATIENT)
Dept: CT IMAGING | Facility: CLINIC | Age: 55
DRG: 897 | End: 2020-01-09
Attending: EMERGENCY MEDICINE

## 2020-01-09 ENCOUNTER — HOSPITAL ENCOUNTER (INPATIENT)
Facility: CLINIC | Age: 55
LOS: 2 days | Discharge: HOME | DRG: 897 | End: 2020-01-11
Attending: EMERGENCY MEDICINE | Admitting: HOSPITALIST

## 2020-01-09 DIAGNOSIS — F10.930 ALCOHOL WITHDRAWAL SEIZURE WITHOUT COMPLICATION (H): ICD-10-CM

## 2020-01-09 DIAGNOSIS — R56.9 ALCOHOL WITHDRAWAL SEIZURE WITHOUT COMPLICATION (H): ICD-10-CM

## 2020-01-09 PROBLEM — F10.939 ALCOHOL WITHDRAWAL (H): Status: ACTIVE | Noted: 2020-01-09

## 2020-01-09 LAB
ALBUMIN SERPL-MCNC: 4.8 G/DL (ref 3.4–5)
ALP SERPL-CCNC: 102 U/L (ref 40–150)
ALT SERPL W P-5'-P-CCNC: 43 U/L (ref 0–70)
ANION GAP SERPL CALCULATED.3IONS-SCNC: 24 MMOL/L (ref 3–14)
AST SERPL W P-5'-P-CCNC: 67 U/L (ref 0–45)
BASOPHILS # BLD AUTO: 0 10E9/L (ref 0–0.2)
BASOPHILS NFR BLD AUTO: 0.3 %
BILIRUB SERPL-MCNC: 1.4 MG/DL (ref 0.2–1.3)
BUN SERPL-MCNC: 16 MG/DL (ref 7–30)
CALCIUM SERPL-MCNC: 10 MG/DL (ref 8.5–10.1)
CHLORIDE SERPL-SCNC: 91 MMOL/L (ref 94–109)
CO2 SERPL-SCNC: 15 MMOL/L (ref 20–32)
CREAT SERPL-MCNC: 1 MG/DL (ref 0.66–1.25)
DIFFERENTIAL METHOD BLD: ABNORMAL
EOSINOPHIL # BLD AUTO: 0 10E9/L (ref 0–0.7)
EOSINOPHIL NFR BLD AUTO: 0.3 %
ERYTHROCYTE [DISTWIDTH] IN BLOOD BY AUTOMATED COUNT: 14 % (ref 10–15)
ETHANOL SERPL-MCNC: <0.01 G/DL
GFR SERPL CREATININE-BSD FRML MDRD: 84 ML/MIN/{1.73_M2}
GLUCOSE BLDC GLUCOMTR-MCNC: 301 MG/DL (ref 70–99)
GLUCOSE BLDC GLUCOMTR-MCNC: 98 MG/DL (ref 70–99)
GLUCOSE SERPL-MCNC: 323 MG/DL (ref 70–99)
HBA1C MFR BLD: 5.4 % (ref 0–5.6)
HCT VFR BLD AUTO: 40.6 % (ref 40–53)
HGB BLD-MCNC: 13.5 G/DL (ref 13.3–17.7)
IMM GRANULOCYTES # BLD: 0 10E9/L (ref 0–0.4)
IMM GRANULOCYTES NFR BLD: 0.3 %
INTERPRETATION ECG - MUSE: NORMAL
LYMPHOCYTES # BLD AUTO: 2.4 10E9/L (ref 0.8–5.3)
LYMPHOCYTES NFR BLD AUTO: 31.1 %
MCH RBC QN AUTO: 34.5 PG (ref 26.5–33)
MCHC RBC AUTO-ENTMCNC: 33.3 G/DL (ref 31.5–36.5)
MCV RBC AUTO: 104 FL (ref 78–100)
MONOCYTES # BLD AUTO: 1.3 10E9/L (ref 0–1.3)
MONOCYTES NFR BLD AUTO: 16.4 %
NEUTROPHILS # BLD AUTO: 4 10E9/L (ref 1.6–8.3)
NEUTROPHILS NFR BLD AUTO: 51.6 %
NRBC # BLD AUTO: 0 10*3/UL
NRBC BLD AUTO-RTO: 0 /100
PLATELET # BLD AUTO: 141 10E9/L (ref 150–450)
POTASSIUM SERPL-SCNC: 3.7 MMOL/L (ref 3.4–5.3)
PROT SERPL-MCNC: 8.7 G/DL (ref 6.8–8.8)
RBC # BLD AUTO: 3.91 10E12/L (ref 4.4–5.9)
SODIUM SERPL-SCNC: 130 MMOL/L (ref 133–144)
WBC # BLD AUTO: 7.8 10E9/L (ref 4–11)

## 2020-01-09 PROCEDURE — 85025 COMPLETE CBC W/AUTO DIFF WBC: CPT | Performed by: EMERGENCY MEDICINE

## 2020-01-09 PROCEDURE — 83036 HEMOGLOBIN GLYCOSYLATED A1C: CPT | Performed by: EMERGENCY MEDICINE

## 2020-01-09 PROCEDURE — 99223 1ST HOSP IP/OBS HIGH 75: CPT | Mod: AI | Performed by: HOSPITALIST

## 2020-01-09 PROCEDURE — 80053 COMPREHEN METABOLIC PANEL: CPT | Performed by: EMERGENCY MEDICINE

## 2020-01-09 PROCEDURE — 96376 TX/PRO/DX INJ SAME DRUG ADON: CPT

## 2020-01-09 PROCEDURE — 00000146 ZZHCL STATISTIC GLUCOSE BY METER IP

## 2020-01-09 PROCEDURE — 25800030 ZZH RX IP 258 OP 636: Performed by: HOSPITALIST

## 2020-01-09 PROCEDURE — 99292 CRITICAL CARE ADDL 30 MIN: CPT

## 2020-01-09 PROCEDURE — 80320 DRUG SCREEN QUANTALCOHOLS: CPT | Performed by: EMERGENCY MEDICINE

## 2020-01-09 PROCEDURE — 99291 CRITICAL CARE FIRST HOUR: CPT | Mod: 25

## 2020-01-09 PROCEDURE — 25000128 H RX IP 250 OP 636: Performed by: EMERGENCY MEDICINE

## 2020-01-09 PROCEDURE — 99207 ZZC CDG-MDM COMPONENT: MEETS MODERATE - UP CODED: CPT | Performed by: HOSPITALIST

## 2020-01-09 PROCEDURE — HZ2ZZZZ DETOXIFICATION SERVICES FOR SUBSTANCE ABUSE TREATMENT: ICD-10-PCS | Performed by: EMERGENCY MEDICINE

## 2020-01-09 PROCEDURE — 96374 THER/PROPH/DIAG INJ IV PUSH: CPT

## 2020-01-09 PROCEDURE — 12000000 ZZH R&B MED SURG/OB

## 2020-01-09 PROCEDURE — 80177 DRUG SCRN QUAN LEVETIRACETAM: CPT | Performed by: EMERGENCY MEDICINE

## 2020-01-09 PROCEDURE — 96375 TX/PRO/DX INJ NEW DRUG ADDON: CPT

## 2020-01-09 PROCEDURE — 93005 ELECTROCARDIOGRAM TRACING: CPT

## 2020-01-09 PROCEDURE — 25000132 ZZH RX MED GY IP 250 OP 250 PS 637: Performed by: HOSPITALIST

## 2020-01-09 PROCEDURE — 70450 CT HEAD/BRAIN W/O DYE: CPT

## 2020-01-09 RX ORDER — LORAZEPAM 2 MG/ML
1-2 INJECTION INTRAMUSCULAR EVERY 30 MIN PRN
Status: DISCONTINUED | OUTPATIENT
Start: 2020-01-09 | End: 2020-01-11 | Stop reason: HOSPADM

## 2020-01-09 RX ORDER — AMOXICILLIN 250 MG
1 CAPSULE ORAL 2 TIMES DAILY
Status: DISCONTINUED | OUTPATIENT
Start: 2020-01-09 | End: 2020-01-10

## 2020-01-09 RX ORDER — AMOXICILLIN 250 MG
2 CAPSULE ORAL 2 TIMES DAILY
Status: DISCONTINUED | OUTPATIENT
Start: 2020-01-09 | End: 2020-01-10

## 2020-01-09 RX ORDER — SODIUM CHLORIDE 9 MG/ML
INJECTION, SOLUTION INTRAVENOUS CONTINUOUS
Status: DISCONTINUED | OUTPATIENT
Start: 2020-01-09 | End: 2020-01-10

## 2020-01-09 RX ORDER — DIAZEPAM 10 MG/2ML
5-10 INJECTION, SOLUTION INTRAMUSCULAR; INTRAVENOUS EVERY 30 MIN PRN
Status: DISCONTINUED | OUTPATIENT
Start: 2020-01-09 | End: 2020-01-09 | Stop reason: ALTCHOICE

## 2020-01-09 RX ORDER — MAGNESIUM SULFATE HEPTAHYDRATE 40 MG/ML
4 INJECTION, SOLUTION INTRAVENOUS EVERY 4 HOURS PRN
Status: DISCONTINUED | OUTPATIENT
Start: 2020-01-09 | End: 2020-01-11 | Stop reason: HOSPADM

## 2020-01-09 RX ORDER — ONDANSETRON 4 MG/1
4 TABLET, ORALLY DISINTEGRATING ORAL EVERY 6 HOURS PRN
Status: DISCONTINUED | OUTPATIENT
Start: 2020-01-09 | End: 2020-01-09

## 2020-01-09 RX ORDER — NALOXONE HYDROCHLORIDE 0.4 MG/ML
.1-.4 INJECTION, SOLUTION INTRAMUSCULAR; INTRAVENOUS; SUBCUTANEOUS
Status: DISCONTINUED | OUTPATIENT
Start: 2020-01-09 | End: 2020-01-11 | Stop reason: HOSPADM

## 2020-01-09 RX ORDER — LORAZEPAM 2 MG/ML
0.5 INJECTION INTRAMUSCULAR EVERY 10 MIN PRN
Status: DISCONTINUED | OUTPATIENT
Start: 2020-01-09 | End: 2020-01-09 | Stop reason: ALTCHOICE

## 2020-01-09 RX ORDER — LANOLIN ALCOHOL/MO/W.PET/CERES
100 CREAM (GRAM) TOPICAL DAILY
Status: DISCONTINUED | OUTPATIENT
Start: 2020-01-09 | End: 2020-01-11 | Stop reason: HOSPADM

## 2020-01-09 RX ORDER — POTASSIUM CHLORIDE 7.45 MG/ML
10 INJECTION INTRAVENOUS
Status: DISCONTINUED | OUTPATIENT
Start: 2020-01-09 | End: 2020-01-11 | Stop reason: HOSPADM

## 2020-01-09 RX ORDER — FOLIC ACID 1 MG/1
1 TABLET ORAL DAILY
Status: DISCONTINUED | OUTPATIENT
Start: 2020-01-09 | End: 2020-01-11 | Stop reason: HOSPADM

## 2020-01-09 RX ORDER — NICOTINE POLACRILEX 4 MG
15-30 LOZENGE BUCCAL
Status: DISCONTINUED | OUTPATIENT
Start: 2020-01-09 | End: 2020-01-10

## 2020-01-09 RX ORDER — QUETIAPINE FUMARATE 25 MG/1
25-50 TABLET, FILM COATED ORAL EVERY 6 HOURS PRN
Status: DISCONTINUED | OUTPATIENT
Start: 2020-01-09 | End: 2020-01-11 | Stop reason: HOSPADM

## 2020-01-09 RX ORDER — DIAZEPAM 5 MG
10 TABLET ORAL EVERY 30 MIN PRN
Status: DISCONTINUED | OUTPATIENT
Start: 2020-01-09 | End: 2020-01-09 | Stop reason: ALTCHOICE

## 2020-01-09 RX ORDER — MULTIPLE VITAMINS W/ MINERALS TAB 9MG-400MCG
1 TAB ORAL DAILY
Status: DISCONTINUED | OUTPATIENT
Start: 2020-01-09 | End: 2020-01-11 | Stop reason: HOSPADM

## 2020-01-09 RX ORDER — LIDOCAINE 40 MG/G
CREAM TOPICAL
Status: DISCONTINUED | OUTPATIENT
Start: 2020-01-09 | End: 2020-01-11 | Stop reason: HOSPADM

## 2020-01-09 RX ORDER — POTASSIUM CHLORIDE 1500 MG/1
20-40 TABLET, EXTENDED RELEASE ORAL
Status: DISCONTINUED | OUTPATIENT
Start: 2020-01-09 | End: 2020-01-11 | Stop reason: HOSPADM

## 2020-01-09 RX ORDER — POTASSIUM CL/LIDO/0.9 % NACL 10MEQ/0.1L
10 INTRAVENOUS SOLUTION, PIGGYBACK (ML) INTRAVENOUS
Status: DISCONTINUED | OUTPATIENT
Start: 2020-01-09 | End: 2020-01-11 | Stop reason: HOSPADM

## 2020-01-09 RX ORDER — DEXTROSE MONOHYDRATE 25 G/50ML
25-50 INJECTION, SOLUTION INTRAVENOUS
Status: DISCONTINUED | OUTPATIENT
Start: 2020-01-09 | End: 2020-01-10

## 2020-01-09 RX ORDER — POTASSIUM CHLORIDE 1.5 G/1.58G
20-40 POWDER, FOR SOLUTION ORAL
Status: DISCONTINUED | OUTPATIENT
Start: 2020-01-09 | End: 2020-01-11 | Stop reason: HOSPADM

## 2020-01-09 RX ORDER — ONDANSETRON 2 MG/ML
4 INJECTION INTRAMUSCULAR; INTRAVENOUS EVERY 6 HOURS PRN
Status: DISCONTINUED | OUTPATIENT
Start: 2020-01-09 | End: 2020-01-09

## 2020-01-09 RX ORDER — LEVETIRACETAM 500 MG/1
1500 TABLET ORAL 2 TIMES DAILY
Status: DISCONTINUED | OUTPATIENT
Start: 2020-01-09 | End: 2020-01-11 | Stop reason: HOSPADM

## 2020-01-09 RX ORDER — ONDANSETRON 2 MG/ML
4 INJECTION INTRAMUSCULAR; INTRAVENOUS ONCE
Status: COMPLETED | OUTPATIENT
Start: 2020-01-09 | End: 2020-01-09

## 2020-01-09 RX ORDER — LORAZEPAM 1 MG/1
1-2 TABLET ORAL EVERY 30 MIN PRN
Status: DISCONTINUED | OUTPATIENT
Start: 2020-01-09 | End: 2020-01-11 | Stop reason: HOSPADM

## 2020-01-09 RX ORDER — POTASSIUM CHLORIDE 29.8 MG/ML
20 INJECTION INTRAVENOUS
Status: DISCONTINUED | OUTPATIENT
Start: 2020-01-09 | End: 2020-01-09

## 2020-01-09 RX ADMIN — LORAZEPAM 0.5 MG: 2 INJECTION INTRAMUSCULAR; INTRAVENOUS at 15:30

## 2020-01-09 RX ADMIN — LORAZEPAM 0.5 MG: 2 INJECTION INTRAMUSCULAR; INTRAVENOUS at 14:41

## 2020-01-09 RX ADMIN — Medication 100 MG: at 17:47

## 2020-01-09 RX ADMIN — SODIUM CHLORIDE: 9 INJECTION, SOLUTION INTRAVENOUS at 17:49

## 2020-01-09 RX ADMIN — FOLIC ACID 1 MG: 1 TABLET ORAL at 17:47

## 2020-01-09 RX ADMIN — LEVETIRACETAM 1500 MG: 500 TABLET, FILM COATED ORAL at 21:43

## 2020-01-09 RX ADMIN — LORAZEPAM 1 MG: 1 TABLET ORAL at 17:47

## 2020-01-09 RX ADMIN — MULTIPLE VITAMINS W/ MINERALS TAB 1 TABLET: TAB at 17:47

## 2020-01-09 RX ADMIN — ONDANSETRON 4 MG: 2 INJECTION INTRAMUSCULAR; INTRAVENOUS at 12:50

## 2020-01-09 ASSESSMENT — ACTIVITIES OF DAILY LIVING (ADL): ADLS_ACUITY_SCORE: 17

## 2020-01-09 ASSESSMENT — ENCOUNTER SYMPTOMS: ABDOMINAL PAIN: 1
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
## 2020-01-09 NOTE — PHARMACY-ADMISSION MEDICATION HISTORY
Pharmacy Medication History  Admission medication history interview status for the 1/9/2020  admission is complete. See EPIC admission navigator for prior to admission medications     Medication history sources: Patient and Surescripts  Medication history source reliability: Good  Adherence assessment: Good      Medication reconciliation completed by provider prior to medication history? No    Time spent in this activity: 5 minutes      Prior to Admission medications    Medication Sig Last Dose Taking? Auth Provider   levETIRAcetam (KEPPRA) 1000 MG tablet Take 1.5 tablets (1,500 mg) by mouth 2 times daily Take three 500 mg tabs (1500 mg) twice daily 1/9/2020 at am Yes Elicia Ewing MD       
Prescriber Notification Note    The pharmacist has communicated with this patient's provider regarding a concern or therapy recommendation.    Notified Person: Dr. Sampson  Date/Time of Notification: 1/9/2020 @3245  Interaction: phone  Concern/Recommendation: 1/9/2020 EKG showed QTc 564, would you like to continue Zofran prn?  TORB to discontinue order and monitor needs.     Grace Malik, PharmD, BCPS    
No
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
## 2020-01-10 LAB
ALBUMIN SERPL-MCNC: 3.8 G/DL (ref 3.4–5)
ALP SERPL-CCNC: 87 U/L (ref 40–150)
ALT SERPL W P-5'-P-CCNC: 32 U/L (ref 0–70)
ANION GAP SERPL CALCULATED.3IONS-SCNC: 9 MMOL/L (ref 3–14)
AST SERPL W P-5'-P-CCNC: 52 U/L (ref 0–45)
BILIRUB SERPL-MCNC: 1.5 MG/DL (ref 0.2–1.3)
BUN SERPL-MCNC: 19 MG/DL (ref 7–30)
CALCIUM SERPL-MCNC: 9.1 MG/DL (ref 8.5–10.1)
CHLORIDE SERPL-SCNC: 99 MMOL/L (ref 94–109)
CO2 SERPL-SCNC: 25 MMOL/L (ref 20–32)
CREAT SERPL-MCNC: 0.76 MG/DL (ref 0.66–1.25)
ERYTHROCYTE [DISTWIDTH] IN BLOOD BY AUTOMATED COUNT: 13.8 % (ref 10–15)
GFR SERPL CREATININE-BSD FRML MDRD: >90 ML/MIN/{1.73_M2}
GLUCOSE BLDC GLUCOMTR-MCNC: 119 MG/DL (ref 70–99)
GLUCOSE BLDC GLUCOMTR-MCNC: 90 MG/DL (ref 70–99)
GLUCOSE BLDC GLUCOMTR-MCNC: 96 MG/DL (ref 70–99)
GLUCOSE BLDC GLUCOMTR-MCNC: 99 MG/DL (ref 70–99)
GLUCOSE SERPL-MCNC: 91 MG/DL (ref 70–99)
HCT VFR BLD AUTO: 37.4 % (ref 40–53)
HGB BLD-MCNC: 12.4 G/DL (ref 13.3–17.7)
MAGNESIUM SERPL-MCNC: 2.1 MG/DL (ref 1.6–2.3)
MCH RBC QN AUTO: 33.3 PG (ref 26.5–33)
MCHC RBC AUTO-ENTMCNC: 33.2 G/DL (ref 31.5–36.5)
MCV RBC AUTO: 101 FL (ref 78–100)
PLATELET # BLD AUTO: 126 10E9/L (ref 150–450)
POTASSIUM SERPL-SCNC: 3.4 MMOL/L (ref 3.4–5.3)
PROT SERPL-MCNC: 7.6 G/DL (ref 6.8–8.8)
RBC # BLD AUTO: 3.72 10E12/L (ref 4.4–5.9)
SODIUM SERPL-SCNC: 133 MMOL/L (ref 133–144)
WBC # BLD AUTO: 5.8 10E9/L (ref 4–11)

## 2020-01-10 PROCEDURE — 99232 SBSQ HOSP IP/OBS MODERATE 35: CPT | Performed by: INTERNAL MEDICINE

## 2020-01-10 PROCEDURE — 25000132 ZZH RX MED GY IP 250 OP 250 PS 637: Performed by: HOSPITALIST

## 2020-01-10 PROCEDURE — 25800030 ZZH RX IP 258 OP 636: Performed by: HOSPITALIST

## 2020-01-10 PROCEDURE — 85027 COMPLETE CBC AUTOMATED: CPT | Performed by: HOSPITALIST

## 2020-01-10 PROCEDURE — 99221 1ST HOSP IP/OBS SF/LOW 40: CPT | Performed by: PSYCHIATRY & NEUROLOGY

## 2020-01-10 PROCEDURE — 36415 COLL VENOUS BLD VENIPUNCTURE: CPT | Performed by: HOSPITALIST

## 2020-01-10 PROCEDURE — 83735 ASSAY OF MAGNESIUM: CPT | Performed by: HOSPITALIST

## 2020-01-10 PROCEDURE — 80053 COMPREHEN METABOLIC PANEL: CPT | Performed by: HOSPITALIST

## 2020-01-10 PROCEDURE — 12000000 ZZH R&B MED SURG/OB

## 2020-01-10 PROCEDURE — 00000146 ZZHCL STATISTIC GLUCOSE BY METER IP

## 2020-01-10 RX ORDER — AMOXICILLIN 250 MG
1 CAPSULE ORAL 2 TIMES DAILY PRN
Status: DISCONTINUED | OUTPATIENT
Start: 2020-01-10 | End: 2020-01-11 | Stop reason: HOSPADM

## 2020-01-10 RX ADMIN — Medication 100 MG: at 08:25

## 2020-01-10 RX ADMIN — LEVETIRACETAM 1500 MG: 500 TABLET, FILM COATED ORAL at 20:21

## 2020-01-10 RX ADMIN — MULTIPLE VITAMINS W/ MINERALS TAB 1 TABLET: TAB at 08:25

## 2020-01-10 RX ADMIN — SODIUM CHLORIDE: 9 INJECTION, SOLUTION INTRAVENOUS at 07:18

## 2020-01-10 RX ADMIN — FOLIC ACID 1 MG: 1 TABLET ORAL at 08:25

## 2020-01-10 RX ADMIN — LEVETIRACETAM 1500 MG: 500 TABLET, FILM COATED ORAL at 08:25

## 2020-01-10 ASSESSMENT — ACTIVITIES OF DAILY LIVING (ADL)
ADLS_ACUITY_SCORE: 17
ADLS_ACUITY_SCORE: 17
ADLS_ACUITY_SCORE: 16
ADLS_ACUITY_SCORE: 18
ADLS_ACUITY_SCORE: 16
ADLS_ACUITY_SCORE: 16

## 2020-01-10 NOTE — PLAN OF CARE
DATE & TIME: 1/10/2020                  Cognitive Concerns/ Orientation : Alert and Oriented x4   BEHAVIOR & AGGRESSION TOOL COLOR: Green   CIWA SCORE: 1,6, scoring for tremor and sweating            ABNL VS/O2: VSS on RA   MOBILITY: Assist of one, a little wobbly on his feet.   PAIN MANAGMENT: Denies   DIET: Reg   BOWEL/BLADDER: Continent   ABNL LAB/BG: Platelets 141, AST 67, Na 130  DRAIN/DEVICES: PIV infusing   TELEMETRY RHYTHM: NSR  SKIN: Intact   TESTS/PROCEDURES: CT and EKG done yesterday  D/C DAY/GOALS/PLACE: Pending   OTHER IMPORTANT INFO: Neuros done q4, noticed early this morning that patient was having nystagmus, no other inconsistencies with neuros noted.  Dr. Palomo notified stated to continue to simpson the checks and his doctors will review it today.   Seizure precautions.  Slept most of the night.

## 2020-01-10 NOTE — PLAN OF CARE
A&Ox4, sl forgetful, coop. Up with SBA with GB, amb to BR x2, declines other OOB activity when enc several times. VSS. Denies pain. Jona diet, good appetite. CIWA 3,2. Neuros intact x cont nystagmus, MD notified on noc, neuro to see. IVF's. Voiding adeq, BM x1. Pt indicates his friends, Garima and Elan, know that he is here and are able to speak for him if he is unable.

## 2020-01-10 NOTE — PLAN OF CARE
Cognitive Concerns/ Orientation : A&Ox3-4. Pt slightly confused at times about environment. Q4 neuro intact.   BEHAVIOR & AGGRESSION TOOL COLOR: Green   CIWA SCORE: 10, 5, 4   ABNL VS/O2: VSS with HR in the 110s-140s at times. On Tele, MD notified. On RA.   MOBILITY: SBA. Pt shaky and slightly unsteady at times.  PAIN MANAGMENT: Denies  DIET: Reg. Good appetite. Carb counting for high BGL in the ED.   BOWEL/BLADDER: WDL. Pt up to bathroom.   ABNL LAB/BG: Na 130, Bili 1.4, Ast 67, , 98. BAL 0.01 on admission. Keppra 0.53.   DRAIN/DEVICES: PIV in LUE infusing NS at 75mL/hr.   TELEMETRY RHYTHM: Sinus Tachycardia. HR 110s-140s.   SKIN: WDL.   TESTS/PROCEDURES: CT and EKG completed in ED.   D/C DAY/GOALS/PLACE: Pending   OTHER IMPORTANT INFO: CIWA and Q4 neuro checks ordered. Pt sleeping off and on. Cooperative with cares. Bed alarm on for safety. Seizure precautions in place.
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
## 2020-01-10 NOTE — CONSULTS
Patient seen for initial psychiatric consultation. Refer to dictated note.    Daniel Santamaria, DO

## 2020-01-10 NOTE — PROVIDER NOTIFICATION
MD Notification    Notified Person: MD    Notified Person Name: Stacia    Notification Date/Time: 1/10/20 9089    Notification Interaction: Phone    Purpose of Notification: Nystagmus noted on neuro checks    Orders Received: Continue to do neuro checks and his team will review it today.      Comments:
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
## 2020-01-10 NOTE — PROVIDER NOTIFICATION
MD Sampson paged for HR in the 140's.   Tele Sinus Tach   Pt asymptomatic.   MD will address and also put in orders for follow BGL d/t >300 on admission and put in sliding scale.

## 2020-01-11 VITALS
HEART RATE: 87 BPM | RESPIRATION RATE: 18 BRPM | BODY MASS INDEX: 22.34 KG/M2 | SYSTOLIC BLOOD PRESSURE: 137 MMHG | DIASTOLIC BLOOD PRESSURE: 99 MMHG | WEIGHT: 193.34 LBS | TEMPERATURE: 98.4 F | OXYGEN SATURATION: 94 %

## 2020-01-11 LAB — LEVETIRACETAM SERPL-MCNC: 30 UG/ML (ref 12–46)

## 2020-01-11 PROCEDURE — 25000132 ZZH RX MED GY IP 250 OP 250 PS 637: Performed by: HOSPITALIST

## 2020-01-11 PROCEDURE — 99239 HOSP IP/OBS DSCHRG MGMT >30: CPT | Performed by: INTERNAL MEDICINE

## 2020-01-11 PROCEDURE — 90682 RIV4 VACC RECOMBINANT DNA IM: CPT | Performed by: INTERNAL MEDICINE

## 2020-01-11 PROCEDURE — 25000128 H RX IP 250 OP 636: Performed by: INTERNAL MEDICINE

## 2020-01-11 RX ADMIN — INFLUENZA A VIRUS A/BRISBANE/02/2018 (H1N1) RECOMBINANT HEMAGGLUTININ ANTIGEN, INFLUENZA A VIRUS A/KANSAS/14/2017 (H3N2) RECOMBINANT HEMAGGLUTININ ANTIGEN, INFLUENZA B VIRUS B/PHUKET/3073/2013 RECOMBINANT HEMAGGLUTININ ANTIGEN, AND INFLUENZA B VIRUS B/MARYLAND/15/2016 RECOMBINANT HEMAGGLUTININ ANTIGEN 0.5 ML: 45; 45; 45; 45 INJECTION INTRAMUSCULAR at 10:51

## 2020-01-11 RX ADMIN — LEVETIRACETAM 1500 MG: 500 TABLET, FILM COATED ORAL at 08:24

## 2020-01-11 RX ADMIN — MULTIPLE VITAMINS W/ MINERALS TAB 1 TABLET: TAB at 08:24

## 2020-01-11 RX ADMIN — Medication 100 MG: at 08:24

## 2020-01-11 RX ADMIN — FOLIC ACID 1 MG: 1 TABLET ORAL at 08:24

## 2020-01-11 ASSESSMENT — ACTIVITIES OF DAILY LIVING (ADL)
ADLS_ACUITY_SCORE: 18

## 2020-01-11 NOTE — CONSULTS
SW consult received for chemical dependency. Per chart review, pt has order to discharge home today. Per psychiatry, Dr Santamaria, pt is not interested in a CD resources. SW will complete consult. Will be available if needed.     KIKI Carcamo, LGSW  [phone]  [facility]

## 2020-01-11 NOTE — PROGRESS NOTES
Care Coordination:    Per MD Pt plans to discharge today to home.  Met with patient and explained role. Pt voices no needs for discharge and would like to make own follow up appt.   Will call for ride when ready.  Bedside nurse updated.        Bethany Watts RN BSN  Inpatient Care Coordination  [facility]  [phone]

## 2020-01-11 NOTE — PLAN OF CARE
DATE & TIME: 01/10 from 1900 too 0730    Cognitive Concerns/ Orientation : A&O x 4, forgetful and flat affect   BEHAVIOR & AGGRESSION TOOL COLOR: Green, flat affect  CIWA SCORE: 2   ABNL VS/O2: VSS on RA  MOBILITY: Up and SBA to bathroom  PAIN MANAGMENT: Denied  DIET: Regular   BOWEL/BLADDER: Continent  ABNL LAB/BG: Bilirubin 1.5, AST 52, Plt 126, Hgb 12.6  DRAIN/DEVICES: PIV saline lock  TELEMETRY RHYTHM: NSR  SKIN: Intact  TESTS/PROCEDURES: N/A  D/C DAY/GOALS/PLACE: Possible discharge tomorrow   OTHER IMPORTANT INFO: Seizure precaution maintained. Neuro intact, continues nystagmus

## 2020-01-11 NOTE — PLAN OF CARE
DATE & TIME: 01/11/2020              Cognitive Concerns/ Orientation : A&O x 4, forgetful at times  BEHAVIOR & AGGRESSION TOOL COLOR: Green, flat affect  CIWA SCORE: 2 for mild tremors  ABNL VS/O2: VSS on RA except hypertensive this AM, Dr Sadler notified  MOBILITY: Up and SBA to bathroom  PAIN MANAGMENT: Denied  DIET: Regular   BOWEL/BLADDER: Continent  ABNL LAB/BG: Bilirubin 1.5, AST 52, Plt 126, Hgb 12.6  DRAIN/DEVICES: PIV saline lock  TELEMETRY RHYTHM: NSR  SKIN: Intact  TESTS/PROCEDURES: N/A  D/C DAY/GOALS/PLACE: Possible discharge tomorrow   OTHER IMPORTANT INFO: Seizure precaution maintained. Neuro intact except nystagmus-no change. CMS intact except baseline numbness/tingling to BLE.     Patient discharged to home at 1200, ride given by Pt's family. Discharge instructions reviewed w/ Pt, questions answered. Sent all personal belongings with Pt.
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
## 2020-01-11 NOTE — PLAN OF CARE
A/Ox4, Forgetful at times. SBA to BR. VSS. Denies pain. Regular diet, good appetite. CIWA 3. Neuros intact. Saline locked. Neuro completed assessment. Pt indicates his friends, Garima and Elan, know that he is here and are able to speak for him if he is unable.

## 2020-01-11 NOTE — PROVIDER NOTIFICATION
MD Notification    Notified Person: MD    Notified Person Name: SARAY Sadler    Notification Date/Time: 1/11/20 0844    Notification Interaction: web paged    Purpose of Notification: hypertensive to 137-140s/102 after several rechecks. Pt asymptomatic at this time.     Orders Received: no new orders.   Pt is okay to discharge home today and he can follow up w/ his PCP for BP management.     Comments:

## 2020-01-13 ENCOUNTER — TELEPHONE (OUTPATIENT)
Dept: FAMILY MEDICINE | Facility: CLINIC | Age: 55
End: 2020-01-13

## 2020-01-13 NOTE — TELEPHONE ENCOUNTER
Non detailed message left for pt to return call to clinic and ask to speak with a triage nurse.  Left message for Garima (sister and CTC in chart).   Debora ROUSSEAU RN  EP Triage

## 2020-01-13 NOTE — TELEPHONE ENCOUNTER
Pt was discharged from [facility] on 1/11 after being treated for Alcohol Withdrawal Seizure Without Complication. Please call the pt.Thank you.  Fani Michaels,

## 2020-01-14 ENCOUNTER — DOCUMENTATION ONLY (OUTPATIENT)
Dept: OTHER | Facility: CLINIC | Age: 55
End: 2020-01-14

## 2020-01-14 NOTE — TELEPHONE ENCOUNTER
ED / Discharge Outreach Protocol    Patient Contact    Attempt # 2    Was call answered?  No.  Left message on voicemail with information to call me back.    Post Discharge Medication Reconciliation Status: unable to reconcile discharge medications due to unable to get a hold of patient.      Isabel Calvert RN, BSN  [facility]

## 2020-01-15 NOTE — TELEPHONE ENCOUNTER
ED / Discharge Outreach Protocol    Patient Contact    Attempt # 3    Was call answered?  No.  Left message on voicemail with information to call me back.    Post Discharge Medication Reconciliation Status: unable to reconcile discharge medications due to unable to get a hold of patient.        Isabel Calvert RN, BSN  [facility]  .

## 2020-07-24 ENCOUNTER — HOSPITAL ENCOUNTER (INPATIENT)
Facility: CLINIC | Age: 55
LOS: 9 days | Discharge: HOME | DRG: 101 | End: 2020-08-02
Attending: EMERGENCY MEDICINE | Admitting: HOSPITALIST

## 2020-07-24 ENCOUNTER — APPOINTMENT (OUTPATIENT)
Dept: CT IMAGING | Facility: CLINIC | Age: 55
DRG: 101 | End: 2020-07-24
Attending: EMERGENCY MEDICINE

## 2020-07-24 ENCOUNTER — APPOINTMENT (OUTPATIENT)
Dept: GENERAL RADIOLOGY | Facility: CLINIC | Age: 55
DRG: 101 | End: 2020-07-24
Attending: HOSPITALIST

## 2020-07-24 ENCOUNTER — APPOINTMENT (OUTPATIENT)
Dept: GENERAL RADIOLOGY | Facility: CLINIC | Age: 55
DRG: 101 | End: 2020-07-24
Attending: EMERGENCY MEDICINE

## 2020-07-24 DIAGNOSIS — E55.9 VITAMIN D DEFICIENCY: ICD-10-CM

## 2020-07-24 DIAGNOSIS — F10.939 ALCOHOL WITHDRAWAL SEIZURE WITH COMPLICATION (H): ICD-10-CM

## 2020-07-24 DIAGNOSIS — F32.A DEPRESSION, UNSPECIFIED DEPRESSION TYPE: ICD-10-CM

## 2020-07-24 DIAGNOSIS — R56.9 ALCOHOL WITHDRAWAL SEIZURE WITH COMPLICATION (H): ICD-10-CM

## 2020-07-24 DIAGNOSIS — I10 ESSENTIAL HYPERTENSION: Primary | ICD-10-CM

## 2020-07-24 LAB
ALBUMIN SERPL-MCNC: 4.7 G/DL (ref 3.4–5)
ALP SERPL-CCNC: 114 U/L (ref 40–150)
ALT SERPL W P-5'-P-CCNC: 42 U/L (ref 0–70)
ANION GAP SERPL CALCULATED.3IONS-SCNC: 22 MMOL/L (ref 3–14)
ANION GAP SERPL CALCULATED.3IONS-SCNC: 8 MMOL/L (ref 3–14)
AST SERPL W P-5'-P-CCNC: 74 U/L (ref 0–45)
BASE DEFICIT BLDV-SCNC: 9.2 MMOL/L
BASOPHILS # BLD AUTO: 0.1 10E9/L (ref 0–0.2)
BASOPHILS NFR BLD AUTO: 0.9 %
BILIRUB SERPL-MCNC: 2.3 MG/DL (ref 0.2–1.3)
BUN SERPL-MCNC: 10 MG/DL (ref 7–30)
BUN SERPL-MCNC: 13 MG/DL (ref 7–30)
CALCIUM SERPL-MCNC: 8.2 MG/DL (ref 8.5–10.1)
CALCIUM SERPL-MCNC: 9.8 MG/DL (ref 8.5–10.1)
CHLORIDE SERPL-SCNC: 106 MMOL/L (ref 94–109)
CHLORIDE SERPL-SCNC: 98 MMOL/L (ref 94–109)
CO2 SERPL-SCNC: 10 MMOL/L (ref 20–32)
CO2 SERPL-SCNC: 21 MMOL/L (ref 20–32)
CREAT SERPL-MCNC: 0.69 MG/DL (ref 0.66–1.25)
CREAT SERPL-MCNC: 0.97 MG/DL (ref 0.66–1.25)
DIFFERENTIAL METHOD BLD: ABNORMAL
EOSINOPHIL # BLD AUTO: 0.1 10E9/L (ref 0–0.7)
EOSINOPHIL NFR BLD AUTO: 0.7 %
ERYTHROCYTE [DISTWIDTH] IN BLOOD BY AUTOMATED COUNT: 14.1 % (ref 10–15)
ETHANOL SERPL-MCNC: <0.01 G/DL
GFR SERPL CREATININE-BSD FRML MDRD: 87 ML/MIN/{1.73_M2}
GFR SERPL CREATININE-BSD FRML MDRD: >90 ML/MIN/{1.73_M2}
GLUCOSE BLDC GLUCOMTR-MCNC: 102 MG/DL (ref 70–99)
GLUCOSE BLDC GLUCOMTR-MCNC: 108 MG/DL (ref 70–99)
GLUCOSE BLDC GLUCOMTR-MCNC: 95 MG/DL (ref 70–99)
GLUCOSE SERPL-MCNC: 263 MG/DL (ref 70–99)
GLUCOSE SERPL-MCNC: 86 MG/DL (ref 70–99)
HBA1C MFR BLD: 5.1 % (ref 0–5.6)
HCO3 BLDV-SCNC: 16 MMOL/L (ref 21–28)
HCT VFR BLD AUTO: 41.2 % (ref 40–53)
HGB BLD-MCNC: 13.4 G/DL (ref 13.3–17.7)
IMM GRANULOCYTES # BLD: 0.1 10E9/L (ref 0–0.4)
IMM GRANULOCYTES NFR BLD: 1.9 %
KETONES BLD-SCNC: 3.9 MMOL/L (ref 0–0.6)
KETONES BLD-SCNC: 4.2 MMOL/L (ref 0–0.6)
LACTATE BLD-SCNC: 1 MMOL/L (ref 0.7–2)
LACTATE BLD-SCNC: 3.3 MMOL/L (ref 0.7–2)
LYMPHOCYTES # BLD AUTO: 1.3 10E9/L (ref 0.8–5.3)
LYMPHOCYTES NFR BLD AUTO: 18.3 %
MAGNESIUM SERPL-MCNC: 2.4 MG/DL (ref 1.6–2.3)
MCH RBC QN AUTO: 35.4 PG (ref 26.5–33)
MCHC RBC AUTO-ENTMCNC: 32.5 G/DL (ref 31.5–36.5)
MCV RBC AUTO: 109 FL (ref 78–100)
MONOCYTES # BLD AUTO: 0.8 10E9/L (ref 0–1.3)
MONOCYTES NFR BLD AUTO: 11.2 %
NEUTROPHILS # BLD AUTO: 4.6 10E9/L (ref 1.6–8.3)
NEUTROPHILS NFR BLD AUTO: 67 %
NRBC # BLD AUTO: 0 10*3/UL
NRBC BLD AUTO-RTO: 0 /100
O2/TOTAL GAS SETTING VFR VENT: ABNORMAL %
OXYHGB MFR BLDV: 73 %
PCO2 BLDV: 30 MM HG (ref 40–50)
PH BLDV: 7.32 PH (ref 7.32–7.43)
PLATELET # BLD AUTO: 119 10E9/L (ref 150–450)
PO2 BLDV: 43 MM HG (ref 25–47)
POTASSIUM SERPL-SCNC: 4.3 MMOL/L (ref 3.4–5.3)
POTASSIUM SERPL-SCNC: 4.3 MMOL/L (ref 3.4–5.3)
PROT SERPL-MCNC: 8.6 G/DL (ref 6.8–8.8)
RBC # BLD AUTO: 3.79 10E12/L (ref 4.4–5.9)
SODIUM SERPL-SCNC: 130 MMOL/L (ref 133–144)
SODIUM SERPL-SCNC: 135 MMOL/L (ref 133–144)
WBC # BLD AUTO: 6.9 10E9/L (ref 4–11)

## 2020-07-24 PROCEDURE — 25000128 H RX IP 250 OP 636: Performed by: HOSPITALIST

## 2020-07-24 PROCEDURE — 96361 HYDRATE IV INFUSION ADD-ON: CPT

## 2020-07-24 PROCEDURE — 80177 DRUG SCRN QUAN LEVETIRACETAM: CPT | Performed by: EMERGENCY MEDICINE

## 2020-07-24 PROCEDURE — 85025 COMPLETE CBC W/AUTO DIFF WBC: CPT | Performed by: EMERGENCY MEDICINE

## 2020-07-24 PROCEDURE — C9803 HOPD COVID-19 SPEC COLLECT: HCPCS

## 2020-07-24 PROCEDURE — 93005 ELECTROCARDIOGRAM TRACING: CPT

## 2020-07-24 PROCEDURE — 72100 X-RAY EXAM L-S SPINE 2/3 VWS: CPT

## 2020-07-24 PROCEDURE — 83605 ASSAY OF LACTIC ACID: CPT | Performed by: HOSPITALIST

## 2020-07-24 PROCEDURE — 96375 TX/PRO/DX INJ NEW DRUG ADDON: CPT

## 2020-07-24 PROCEDURE — 70450 CT HEAD/BRAIN W/O DYE: CPT

## 2020-07-24 PROCEDURE — 83036 HEMOGLOBIN GLYCOSYLATED A1C: CPT | Performed by: HOSPITALIST

## 2020-07-24 PROCEDURE — 25000132 ZZH RX MED GY IP 250 OP 250 PS 637: Performed by: INTERNAL MEDICINE

## 2020-07-24 PROCEDURE — 82805 BLOOD GASES W/O2 SATURATION: CPT | Performed by: EMERGENCY MEDICINE

## 2020-07-24 PROCEDURE — 25800030 ZZH RX IP 258 OP 636: Performed by: HOSPITALIST

## 2020-07-24 PROCEDURE — 25000132 ZZH RX MED GY IP 250 OP 250 PS 637: Performed by: HOSPITALIST

## 2020-07-24 PROCEDURE — 71046 X-RAY EXAM CHEST 2 VIEWS: CPT

## 2020-07-24 PROCEDURE — 25000125 ZZHC RX 250: Performed by: EMERGENCY MEDICINE

## 2020-07-24 PROCEDURE — 25000128 H RX IP 250 OP 636: Performed by: EMERGENCY MEDICINE

## 2020-07-24 PROCEDURE — 00000146 ZZHCL STATISTIC GLUCOSE BY METER IP

## 2020-07-24 PROCEDURE — 80048 BASIC METABOLIC PNL TOTAL CA: CPT | Performed by: HOSPITALIST

## 2020-07-24 PROCEDURE — 83735 ASSAY OF MAGNESIUM: CPT | Performed by: EMERGENCY MEDICINE

## 2020-07-24 PROCEDURE — 82010 KETONE BODYS QUAN: CPT | Performed by: HOSPITALIST

## 2020-07-24 PROCEDURE — 25800030 ZZH RX IP 258 OP 636: Performed by: EMERGENCY MEDICINE

## 2020-07-24 PROCEDURE — 83605 ASSAY OF LACTIC ACID: CPT | Performed by: EMERGENCY MEDICINE

## 2020-07-24 PROCEDURE — 99285 EMERGENCY DEPT VISIT HI MDM: CPT | Mod: 25

## 2020-07-24 PROCEDURE — 82010 KETONE BODYS QUAN: CPT | Performed by: EMERGENCY MEDICINE

## 2020-07-24 PROCEDURE — 96374 THER/PROPH/DIAG INJ IV PUSH: CPT

## 2020-07-24 PROCEDURE — 21000001 ZZH R&B HEART CARE

## 2020-07-24 PROCEDURE — 80053 COMPREHEN METABOLIC PANEL: CPT | Performed by: EMERGENCY MEDICINE

## 2020-07-24 PROCEDURE — HZ2ZZZZ DETOXIFICATION SERVICES FOR SUBSTANCE ABUSE TREATMENT: ICD-10-PCS | Performed by: HOSPITALIST

## 2020-07-24 PROCEDURE — 99223 1ST HOSP IP/OBS HIGH 75: CPT | Mod: AI | Performed by: HOSPITALIST

## 2020-07-24 PROCEDURE — 36415 COLL VENOUS BLD VENIPUNCTURE: CPT | Performed by: HOSPITALIST

## 2020-07-24 PROCEDURE — 93010 ELECTROCARDIOGRAM REPORT: CPT | Performed by: INTERNAL MEDICINE

## 2020-07-24 PROCEDURE — C9113 INJ PANTOPRAZOLE SODIUM, VIA: HCPCS | Performed by: HOSPITALIST

## 2020-07-24 PROCEDURE — 80320 DRUG SCREEN QUANTALCOHOLS: CPT | Performed by: EMERGENCY MEDICINE

## 2020-07-24 PROCEDURE — 21000000 ZZH R&B IMCU HEART CARE

## 2020-07-24 PROCEDURE — U0003 INFECTIOUS AGENT DETECTION BY NUCLEIC ACID (DNA OR RNA); SEVERE ACUTE RESPIRATORY SYNDROME CORONAVIRUS 2 (SARS-COV-2) (CORONAVIRUS DISEASE [COVID-19]), AMPLIFIED PROBE TECHNIQUE, MAKING USE OF HIGH THROUGHPUT TECHNOLOGIES AS DESCRIBED BY CMS-2020-01-R: HCPCS | Performed by: EMERGENCY MEDICINE

## 2020-07-24 RX ORDER — ONDANSETRON 2 MG/ML
4 INJECTION INTRAMUSCULAR; INTRAVENOUS EVERY 6 HOURS PRN
Status: DISCONTINUED | OUTPATIENT
Start: 2020-07-24 | End: 2020-08-02 | Stop reason: HOSPADM

## 2020-07-24 RX ORDER — POTASSIUM CL/LIDO/0.9 % NACL 10MEQ/0.1L
10 INTRAVENOUS SOLUTION, PIGGYBACK (ML) INTRAVENOUS
Status: DISCONTINUED | OUTPATIENT
Start: 2020-07-24 | End: 2020-08-02 | Stop reason: HOSPADM

## 2020-07-24 RX ORDER — LANOLIN ALCOHOL/MO/W.PET/CERES
100 CREAM (GRAM) TOPICAL DAILY
Status: COMPLETED | OUTPATIENT
Start: 2020-07-25 | End: 2020-07-28

## 2020-07-24 RX ORDER — MULTIPLE VITAMINS W/ MINERALS TAB 9MG-400MCG
1 TAB ORAL DAILY
Status: DISCONTINUED | OUTPATIENT
Start: 2020-07-24 | End: 2020-08-02 | Stop reason: HOSPADM

## 2020-07-24 RX ORDER — ONDANSETRON 4 MG/1
4 TABLET, ORALLY DISINTEGRATING ORAL EVERY 6 HOURS PRN
Status: DISCONTINUED | OUTPATIENT
Start: 2020-07-24 | End: 2020-08-02 | Stop reason: HOSPADM

## 2020-07-24 RX ORDER — FOLIC ACID 5 MG/ML
1 INJECTION, SOLUTION INTRAMUSCULAR; INTRAVENOUS; SUBCUTANEOUS ONCE
Status: COMPLETED | OUTPATIENT
Start: 2020-07-24 | End: 2020-07-24

## 2020-07-24 RX ORDER — PROCHLORPERAZINE 25 MG
25 SUPPOSITORY, RECTAL RECTAL EVERY 12 HOURS PRN
Status: DISCONTINUED | OUTPATIENT
Start: 2020-07-24 | End: 2020-08-02 | Stop reason: HOSPADM

## 2020-07-24 RX ORDER — LORAZEPAM 2 MG/ML
1 INJECTION INTRAMUSCULAR ONCE
Status: COMPLETED | OUTPATIENT
Start: 2020-07-24 | End: 2020-07-24

## 2020-07-24 RX ORDER — THIAMINE HYDROCHLORIDE 100 MG/ML
100 INJECTION, SOLUTION INTRAMUSCULAR; INTRAVENOUS ONCE
Status: COMPLETED | OUTPATIENT
Start: 2020-07-24 | End: 2020-07-24

## 2020-07-24 RX ORDER — CLONIDINE HYDROCHLORIDE 0.1 MG/1
0.1 TABLET ORAL 2 TIMES DAILY
Status: DISCONTINUED | OUTPATIENT
Start: 2020-07-24 | End: 2020-07-28

## 2020-07-24 RX ORDER — PROCHLORPERAZINE MALEATE 5 MG
10 TABLET ORAL EVERY 6 HOURS PRN
Status: DISCONTINUED | OUTPATIENT
Start: 2020-07-24 | End: 2020-08-02 | Stop reason: HOSPADM

## 2020-07-24 RX ORDER — LORAZEPAM 2 MG/ML
1-2 INJECTION INTRAMUSCULAR EVERY 30 MIN PRN
Status: DISCONTINUED | OUTPATIENT
Start: 2020-07-24 | End: 2020-08-02 | Stop reason: HOSPADM

## 2020-07-24 RX ORDER — LORAZEPAM 2 MG/ML
2 INJECTION INTRAMUSCULAR
Status: DISCONTINUED | OUTPATIENT
Start: 2020-07-24 | End: 2020-08-02 | Stop reason: HOSPADM

## 2020-07-24 RX ORDER — ACETAMINOPHEN 325 MG/1
650 TABLET ORAL EVERY 8 HOURS PRN
Status: DISCONTINUED | OUTPATIENT
Start: 2020-07-24 | End: 2020-08-02 | Stop reason: HOSPADM

## 2020-07-24 RX ORDER — LIDOCAINE 40 MG/G
CREAM TOPICAL
Status: DISCONTINUED | OUTPATIENT
Start: 2020-07-24 | End: 2020-07-27

## 2020-07-24 RX ORDER — OLANZAPINE 5 MG/1
5 TABLET, ORALLY DISINTEGRATING ORAL EVERY 6 HOURS PRN
Status: DISCONTINUED | OUTPATIENT
Start: 2020-07-24 | End: 2020-08-02 | Stop reason: HOSPADM

## 2020-07-24 RX ORDER — POTASSIUM CHLORIDE 1.5 G/1.58G
20-40 POWDER, FOR SOLUTION ORAL
Status: DISCONTINUED | OUTPATIENT
Start: 2020-07-24 | End: 2020-08-02 | Stop reason: HOSPADM

## 2020-07-24 RX ORDER — QUETIAPINE FUMARATE 25 MG/1
25-50 TABLET, FILM COATED ORAL EVERY 6 HOURS PRN
Status: DISCONTINUED | OUTPATIENT
Start: 2020-07-24 | End: 2020-08-02 | Stop reason: HOSPADM

## 2020-07-24 RX ORDER — NICOTINE POLACRILEX 4 MG
15-30 LOZENGE BUCCAL
Status: DISCONTINUED | OUTPATIENT
Start: 2020-07-24 | End: 2020-08-02 | Stop reason: HOSPADM

## 2020-07-24 RX ORDER — LIDOCAINE 40 MG/G
CREAM TOPICAL
Status: DISCONTINUED | OUTPATIENT
Start: 2020-07-24 | End: 2020-08-02 | Stop reason: HOSPADM

## 2020-07-24 RX ORDER — BISACODYL 10 MG
10 SUPPOSITORY, RECTAL RECTAL DAILY PRN
Status: DISCONTINUED | OUTPATIENT
Start: 2020-07-24 | End: 2020-08-02 | Stop reason: HOSPADM

## 2020-07-24 RX ORDER — MAGNESIUM SULFATE HEPTAHYDRATE 40 MG/ML
4 INJECTION, SOLUTION INTRAVENOUS EVERY 4 HOURS PRN
Status: DISCONTINUED | OUTPATIENT
Start: 2020-07-24 | End: 2020-08-02 | Stop reason: HOSPADM

## 2020-07-24 RX ORDER — LEVETIRACETAM 500 MG/1
1500 TABLET ORAL 2 TIMES DAILY
Status: DISCONTINUED | OUTPATIENT
Start: 2020-07-24 | End: 2020-07-26

## 2020-07-24 RX ORDER — FOLIC ACID 1 MG/1
1 TABLET ORAL DAILY
Status: DISCONTINUED | OUTPATIENT
Start: 2020-07-25 | End: 2020-08-02 | Stop reason: HOSPADM

## 2020-07-24 RX ORDER — DEXTROSE MONOHYDRATE 25 G/50ML
25-50 INJECTION, SOLUTION INTRAVENOUS
Status: DISCONTINUED | OUTPATIENT
Start: 2020-07-24 | End: 2020-08-02 | Stop reason: HOSPADM

## 2020-07-24 RX ORDER — NALOXONE HYDROCHLORIDE 0.4 MG/ML
.1-.4 INJECTION, SOLUTION INTRAMUSCULAR; INTRAVENOUS; SUBCUTANEOUS
Status: DISCONTINUED | OUTPATIENT
Start: 2020-07-24 | End: 2020-08-02 | Stop reason: HOSPADM

## 2020-07-24 RX ORDER — POTASSIUM CHLORIDE 1500 MG/1
20-40 TABLET, EXTENDED RELEASE ORAL
Status: DISCONTINUED | OUTPATIENT
Start: 2020-07-24 | End: 2020-08-02 | Stop reason: HOSPADM

## 2020-07-24 RX ORDER — POLYETHYLENE GLYCOL 3350 17 G/17G
17 POWDER, FOR SOLUTION ORAL DAILY PRN
Status: DISCONTINUED | OUTPATIENT
Start: 2020-07-24 | End: 2020-08-02 | Stop reason: HOSPADM

## 2020-07-24 RX ORDER — POTASSIUM CHLORIDE 7.45 MG/ML
10 INJECTION INTRAVENOUS
Status: DISCONTINUED | OUTPATIENT
Start: 2020-07-24 | End: 2020-08-02 | Stop reason: HOSPADM

## 2020-07-24 RX ORDER — LORAZEPAM 1 MG/1
1-2 TABLET ORAL EVERY 30 MIN PRN
Status: DISCONTINUED | OUTPATIENT
Start: 2020-07-24 | End: 2020-08-02 | Stop reason: HOSPADM

## 2020-07-24 RX ORDER — NITROGLYCERIN 0.4 MG/1
0.4 TABLET SUBLINGUAL EVERY 5 MIN PRN
Status: DISCONTINUED | OUTPATIENT
Start: 2020-07-24 | End: 2020-07-27

## 2020-07-24 RX ORDER — OXYCODONE HYDROCHLORIDE 5 MG/1
5 TABLET ORAL EVERY 8 HOURS PRN
Status: DISCONTINUED | OUTPATIENT
Start: 2020-07-24 | End: 2020-08-02 | Stop reason: HOSPADM

## 2020-07-24 RX ORDER — POTASSIUM CHLORIDE 29.8 MG/ML
20 INJECTION INTRAVENOUS
Status: DISCONTINUED | OUTPATIENT
Start: 2020-07-24 | End: 2020-08-02 | Stop reason: HOSPADM

## 2020-07-24 RX ORDER — SODIUM CHLORIDE AND POTASSIUM CHLORIDE 150; 900 MG/100ML; MG/100ML
INJECTION, SOLUTION INTRAVENOUS CONTINUOUS
Status: DISCONTINUED | OUTPATIENT
Start: 2020-07-24 | End: 2020-07-25

## 2020-07-24 RX ADMIN — CLONIDINE HYDROCHLORIDE 0.1 MG: 0.1 TABLET ORAL at 21:22

## 2020-07-24 RX ADMIN — FOLIC ACID 1 MG: 5 INJECTION, SOLUTION INTRAMUSCULAR; INTRAVENOUS; SUBCUTANEOUS at 12:49

## 2020-07-24 RX ADMIN — POTASSIUM CHLORIDE AND SODIUM CHLORIDE: 900; 150 INJECTION, SOLUTION INTRAVENOUS at 17:53

## 2020-07-24 RX ADMIN — LORAZEPAM 1 MG: 2 INJECTION INTRAMUSCULAR; INTRAVENOUS at 11:21

## 2020-07-24 RX ADMIN — OXYCODONE HYDROCHLORIDE 5 MG: 5 TABLET ORAL at 18:08

## 2020-07-24 RX ADMIN — SODIUM CHLORIDE 1000 ML: 9 INJECTION, SOLUTION INTRAVENOUS at 11:24

## 2020-07-24 RX ADMIN — POTASSIUM CHLORIDE AND SODIUM CHLORIDE: 900; 150 INJECTION, SOLUTION INTRAVENOUS at 23:57

## 2020-07-24 RX ADMIN — MULTIPLE VITAMINS W/ MINERALS TAB 1 TABLET: TAB at 18:08

## 2020-07-24 RX ADMIN — SODIUM CHLORIDE 1000 ML: 9 INJECTION, SOLUTION INTRAVENOUS at 12:53

## 2020-07-24 RX ADMIN — SODIUM CHLORIDE 1000 ML: 9 INJECTION, SOLUTION INTRAVENOUS at 16:42

## 2020-07-24 RX ADMIN — PANTOPRAZOLE SODIUM 40 MG: 40 INJECTION, POWDER, FOR SOLUTION INTRAVENOUS at 18:08

## 2020-07-24 RX ADMIN — LEVETIRACETAM 1500 MG: 500 TABLET, FILM COATED ORAL at 21:21

## 2020-07-24 RX ADMIN — THIAMINE HYDROCHLORIDE 100 MG: 100 INJECTION, SOLUTION INTRAMUSCULAR; INTRAVENOUS at 12:49

## 2020-07-24 RX ADMIN — SODIUM CHLORIDE, POTASSIUM CHLORIDE, SODIUM LACTATE AND CALCIUM CHLORIDE 1000 ML: 600; 310; 30; 20 INJECTION, SOLUTION INTRAVENOUS at 13:46

## 2020-07-24 ASSESSMENT — ENCOUNTER SYMPTOMS
HEADACHES: 0
FEVER: 0
TREMORS: 1
SHORTNESS OF BREATH: 0
BACK PAIN: 1
ABDOMINAL PAIN: 0
SEIZURES: 1

## 2020-07-24 NOTE — PHARMACY-ADMISSION MEDICATION HISTORY
Pharmacy Medication History  Admission medication history interview status for the 7/24/2020  admission is complete. See EPIC admission navigator for prior to admission medications     Medication history sources: Patient  Medication history source reliability: Good  Adherence assessment: Good    Medication reconciliation completed by provider prior to medication history? No    Time spent in this activity: 5 minutes      Prior to Admission medications    Medication Sig Last Dose Taking? Auth Provider   levETIRAcetam (KEPPRA) 1000 MG tablet Take 1.5 tablets (1,500 mg) by mouth 2 times daily Take three 500 mg tabs (1500 mg) twice daily 7/24/2020 at am Yes Elicia Ewing MD Beth Mulder, PharmD

## 2020-07-24 NOTE — ED NOTES
DATE:  7/24/2020   TIME OF RECEIPT FROM LAB:  3197  LAB TEST:  Ketone quant  LAB VALUE:  4.2  RESULTS GIVEN WITH READ-BACK TO (PROVIDER):  Elan Rogers MD  TIME LAB VALUE REPORTED TO PROVIDER:   9584
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
## 2020-07-24 NOTE — ED NOTES
DATE:  7/24/2020   TIME OF RECEIPT FROM LAB:  1151  LAB TEST:  CO2  LAB VALUE:  10  RESULTS GIVEN WITH READ-BACK TO (PROVIDER):  Elan Rogers MD  TIME LAB VALUE REPORTED TO PROVIDER:   1151

## 2020-07-25 LAB
ALBUMIN SERPL-MCNC: 3.6 G/DL (ref 3.4–5)
ALP SERPL-CCNC: 78 U/L (ref 40–150)
ALT SERPL W P-5'-P-CCNC: 32 U/L (ref 0–70)
ANION GAP SERPL CALCULATED.3IONS-SCNC: 13 MMOL/L (ref 3–14)
ANION GAP SERPL CALCULATED.3IONS-SCNC: 9 MMOL/L (ref 3–14)
AST SERPL W P-5'-P-CCNC: 54 U/L (ref 0–45)
BASOPHILS # BLD AUTO: 0 10E9/L (ref 0–0.2)
BASOPHILS NFR BLD AUTO: 0.4 %
BILIRUB SERPL-MCNC: 2 MG/DL (ref 0.2–1.3)
BUN SERPL-MCNC: 5 MG/DL (ref 7–30)
BUN SERPL-MCNC: 7 MG/DL (ref 7–30)
CALCIUM SERPL-MCNC: 8.3 MG/DL (ref 8.5–10.1)
CALCIUM SERPL-MCNC: 8.5 MG/DL (ref 8.5–10.1)
CHLORIDE SERPL-SCNC: 103 MMOL/L (ref 94–109)
CHLORIDE SERPL-SCNC: 105 MMOL/L (ref 94–109)
CO2 SERPL-SCNC: 16 MMOL/L (ref 20–32)
CO2 SERPL-SCNC: 20 MMOL/L (ref 20–32)
CREAT SERPL-MCNC: 0.51 MG/DL (ref 0.66–1.25)
CREAT SERPL-MCNC: 0.6 MG/DL (ref 0.66–1.25)
DIFFERENTIAL METHOD BLD: ABNORMAL
EOSINOPHIL # BLD AUTO: 0.1 10E9/L (ref 0–0.7)
EOSINOPHIL NFR BLD AUTO: 1.3 %
ERYTHROCYTE [DISTWIDTH] IN BLOOD BY AUTOMATED COUNT: 14.1 % (ref 10–15)
GFR SERPL CREATININE-BSD FRML MDRD: >90 ML/MIN/{1.73_M2}
GFR SERPL CREATININE-BSD FRML MDRD: >90 ML/MIN/{1.73_M2}
GLUCOSE BLDC GLUCOMTR-MCNC: 107 MG/DL (ref 70–99)
GLUCOSE BLDC GLUCOMTR-MCNC: 82 MG/DL (ref 70–99)
GLUCOSE BLDC GLUCOMTR-MCNC: 83 MG/DL (ref 70–99)
GLUCOSE BLDC GLUCOMTR-MCNC: 83 MG/DL (ref 70–99)
GLUCOSE BLDC GLUCOMTR-MCNC: 99 MG/DL (ref 70–99)
GLUCOSE SERPL-MCNC: 77 MG/DL (ref 70–99)
GLUCOSE SERPL-MCNC: 82 MG/DL (ref 70–99)
HCT VFR BLD AUTO: 35.3 % (ref 40–53)
HGB BLD-MCNC: 11.7 G/DL (ref 13.3–17.7)
IMM GRANULOCYTES # BLD: 0 10E9/L (ref 0–0.4)
IMM GRANULOCYTES NFR BLD: 0.4 %
KETONES BLD-SCNC: 6.1 MMOL/L (ref 0–0.6)
LACTATE BLD-SCNC: 0.8 MMOL/L (ref 0.7–2)
LEVETIRACETAM SERPL-MCNC: 52 UG/ML (ref 12–46)
LYMPHOCYTES # BLD AUTO: 0.9 10E9/L (ref 0.8–5.3)
LYMPHOCYTES NFR BLD AUTO: 17.2 %
MCH RBC QN AUTO: 35.5 PG (ref 26.5–33)
MCHC RBC AUTO-ENTMCNC: 33.1 G/DL (ref 31.5–36.5)
MCV RBC AUTO: 107 FL (ref 78–100)
MONOCYTES # BLD AUTO: 0.9 10E9/L (ref 0–1.3)
MONOCYTES NFR BLD AUTO: 16.3 %
NEUTROPHILS # BLD AUTO: 3.5 10E9/L (ref 1.6–8.3)
NEUTROPHILS NFR BLD AUTO: 64.4 %
NRBC # BLD AUTO: 0 10*3/UL
NRBC BLD AUTO-RTO: 0 /100
PLATELET # BLD AUTO: 93 10E9/L (ref 150–450)
POTASSIUM SERPL-SCNC: 3.8 MMOL/L (ref 3.4–5.3)
POTASSIUM SERPL-SCNC: 4.3 MMOL/L (ref 3.4–5.3)
PROT SERPL-MCNC: 6.8 G/DL (ref 6.8–8.8)
RBC # BLD AUTO: 3.3 10E12/L (ref 4.4–5.9)
SARS-COV-2 RNA SPEC QL NAA+PROBE: NOT DETECTED
SODIUM SERPL-SCNC: 132 MMOL/L (ref 133–144)
SODIUM SERPL-SCNC: 134 MMOL/L (ref 133–144)
SPECIMEN SOURCE: NORMAL
WBC # BLD AUTO: 5.5 10E9/L (ref 4–11)

## 2020-07-25 PROCEDURE — 99233 SBSQ HOSP IP/OBS HIGH 50: CPT | Performed by: INTERNAL MEDICINE

## 2020-07-25 PROCEDURE — 83605 ASSAY OF LACTIC ACID: CPT | Performed by: INTERNAL MEDICINE

## 2020-07-25 PROCEDURE — 82010 KETONE BODYS QUAN: CPT | Performed by: INTERNAL MEDICINE

## 2020-07-25 PROCEDURE — 36415 COLL VENOUS BLD VENIPUNCTURE: CPT | Performed by: HOSPITALIST

## 2020-07-25 PROCEDURE — 25000132 ZZH RX MED GY IP 250 OP 250 PS 637: Performed by: INTERNAL MEDICINE

## 2020-07-25 PROCEDURE — 80053 COMPREHEN METABOLIC PANEL: CPT | Performed by: HOSPITALIST

## 2020-07-25 PROCEDURE — 21000000 ZZH R&B IMCU HEART CARE

## 2020-07-25 PROCEDURE — 25000132 ZZH RX MED GY IP 250 OP 250 PS 637: Performed by: HOSPITALIST

## 2020-07-25 PROCEDURE — 25000128 H RX IP 250 OP 636: Performed by: INTERNAL MEDICINE

## 2020-07-25 PROCEDURE — 85025 COMPLETE CBC W/AUTO DIFF WBC: CPT | Performed by: HOSPITALIST

## 2020-07-25 PROCEDURE — C9113 INJ PANTOPRAZOLE SODIUM, VIA: HCPCS | Performed by: HOSPITALIST

## 2020-07-25 PROCEDURE — 00000146 ZZHCL STATISTIC GLUCOSE BY METER IP

## 2020-07-25 PROCEDURE — 80048 BASIC METABOLIC PNL TOTAL CA: CPT | Performed by: INTERNAL MEDICINE

## 2020-07-25 PROCEDURE — 36415 COLL VENOUS BLD VENIPUNCTURE: CPT | Performed by: INTERNAL MEDICINE

## 2020-07-25 PROCEDURE — 25000128 H RX IP 250 OP 636: Performed by: HOSPITALIST

## 2020-07-25 PROCEDURE — 25800030 ZZH RX IP 258 OP 636: Performed by: INTERNAL MEDICINE

## 2020-07-25 RX ADMIN — LORAZEPAM 2 MG: 2 INJECTION INTRAMUSCULAR; INTRAVENOUS at 05:26

## 2020-07-25 RX ADMIN — LEVETIRACETAM 1500 MG: 500 TABLET, FILM COATED ORAL at 20:39

## 2020-07-25 RX ADMIN — CLONIDINE HYDROCHLORIDE 0.1 MG: 0.1 TABLET ORAL at 20:39

## 2020-07-25 RX ADMIN — Medication 100 MG: at 08:28

## 2020-07-25 RX ADMIN — LEVETIRACETAM 1500 MG: 500 TABLET, FILM COATED ORAL at 08:28

## 2020-07-25 RX ADMIN — DEXTROSE AND SODIUM CHLORIDE: 5; 900 INJECTION, SOLUTION INTRAVENOUS at 16:23

## 2020-07-25 RX ADMIN — CLONIDINE HYDROCHLORIDE 0.1 MG: 0.1 TABLET ORAL at 08:28

## 2020-07-25 RX ADMIN — PANTOPRAZOLE SODIUM 40 MG: 40 INJECTION, POWDER, FOR SOLUTION INTRAVENOUS at 08:24

## 2020-07-25 RX ADMIN — MULTIPLE VITAMINS W/ MINERALS TAB 1 TABLET: TAB at 08:28

## 2020-07-25 RX ADMIN — FOLIC ACID 1 MG: 1 TABLET ORAL at 08:28

## 2020-07-25 RX ADMIN — LORAZEPAM 2 MG: 2 INJECTION INTRAMUSCULAR; INTRAVENOUS at 03:08

## 2020-07-25 NOTE — PLAN OF CARE
A&OX4, neuro intact except baseline tremor and generalized weakness, Slow to respond. Pupils equal sluggish. CIWA SCORE 5 for this shift. Pt having low back pain, declined Tylenol and oxycodone at this time. No appetite did have some dinner. . ST . Encourage PO and deep breathing. D5 NS at 100/hr. PT/Chem Dep and Psych to see pt tomorrow.

## 2020-07-25 NOTE — PLAN OF CARE
Neuro- A/O x3 forgetful  and disoriented to situation   Most Recent Vitals- Temp: 97.6  F (36.4  C) Temp src: Oral BP: (!) 128/107 Pulse: 99 Heart Rate: 98 Resp: 15 SpO2: 95 % O2 Device: None (Room air)    Tele/Cardiac- ST, VSS ex Bp high    Resp- room air   Activity- bedrest, but 2 assist if needed to ambulate   Pain- C/o pain in lower back, x1 dose of oxy given w/ minimal relief   Drips- IV infusing 0.9NS w KCL@ 150ml  Skin- WDL ex scabbed abrasions to bilateral knees, old healed incision to L. Lateral Side of rib cage   GI/- Voiding in urinal   Aggression Color- Green  Plan- to continue fluids, monitor neuros Q4hr, monitor Bp and breathing. CIWA protocol in place    Misc- On seizure precautions, side rails padded, BUE and BLE tremor, possibly from ETOH withdrawal.     MARIA DEL CARMEN BROOKE, RN

## 2020-07-25 NOTE — PLAN OF CARE
Neuro- A&OX4, neuro intact except baseline tremor and generalized weakness, CIWA SCORE 6 & 6 for this shift.   Most Recent Vitals- Temp: 98.8  F (37.1  C) Temp src: Oral BP: (!) 139/104 Pulse: 98 Heart Rate: 94 Resp: 23 SpO2: 95 % O2 Device: None (Room air)    Tele/Cardiac- SB/SR  Resp- RA  Activity- UP with 2 gait belt and walker   Pain- denies   Drips- fluid 150cc/hr   Drains/Tubes- P-IV on left hand   Skin- intact   GI/- WDL/Able to use urinal independently   Aggression Color- Green  Plan- psych, chem dep and pt consults pending.   Misc- the following lab result was text paged to Hospitalist.    Ref. Range 7/25/2020 12:56   Ketone Quantitative Latest Ref Range: 0.0 - 0.6 mmol/L 6.1 ()       Shanae Jiménez RN
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
## 2020-07-25 NOTE — PLAN OF CARE
VSS overnight, except tachycardic and hypertensive at times. Lorazepam given per CIWA protocol. Pt pleasant and cooperative. Did not sleep much. Seizure precautions maintained. Neuros intact but slow. Tele SR/ST. NS with 20 KCl infusing at 150ml/hr.

## 2020-07-26 ENCOUNTER — APPOINTMENT (OUTPATIENT)
Dept: CT IMAGING | Facility: CLINIC | Age: 55
DRG: 101 | End: 2020-07-26
Attending: INTERNAL MEDICINE

## 2020-07-26 ENCOUNTER — APPOINTMENT (OUTPATIENT)
Dept: PHYSICAL THERAPY | Facility: CLINIC | Age: 55
DRG: 101 | End: 2020-07-26
Attending: INTERNAL MEDICINE
Payer: OTHER GOVERNMENT

## 2020-07-26 LAB
ALBUMIN SERPL-MCNC: 3.3 G/DL (ref 3.4–5)
ALP SERPL-CCNC: 78 U/L (ref 40–150)
ALT SERPL W P-5'-P-CCNC: 31 U/L (ref 0–70)
AST SERPL W P-5'-P-CCNC: 51 U/L (ref 0–45)
BILIRUB DIRECT SERPL-MCNC: 0.3 MG/DL (ref 0–0.2)
BILIRUB SERPL-MCNC: 1.2 MG/DL (ref 0.2–1.3)
GLUCOSE BLDC GLUCOMTR-MCNC: 103 MG/DL (ref 70–99)
GLUCOSE BLDC GLUCOMTR-MCNC: 112 MG/DL (ref 70–99)
GLUCOSE BLDC GLUCOMTR-MCNC: 119 MG/DL (ref 70–99)
GLUCOSE BLDC GLUCOMTR-MCNC: 122 MG/DL (ref 70–99)
GLUCOSE BLDC GLUCOMTR-MCNC: 124 MG/DL (ref 70–99)
LACTATE BLD-SCNC: 0.9 MMOL/L (ref 0.7–2)
PROT SERPL-MCNC: 6.5 G/DL (ref 6.8–8.8)

## 2020-07-26 PROCEDURE — 25000132 ZZH RX MED GY IP 250 OP 250 PS 637: Performed by: INTERNAL MEDICINE

## 2020-07-26 PROCEDURE — 93005 ELECTROCARDIOGRAM TRACING: CPT

## 2020-07-26 PROCEDURE — 80076 HEPATIC FUNCTION PANEL: CPT | Performed by: INTERNAL MEDICINE

## 2020-07-26 PROCEDURE — 36415 COLL VENOUS BLD VENIPUNCTURE: CPT | Performed by: INTERNAL MEDICINE

## 2020-07-26 PROCEDURE — C9113 INJ PANTOPRAZOLE SODIUM, VIA: HCPCS | Performed by: HOSPITALIST

## 2020-07-26 PROCEDURE — 25000132 ZZH RX MED GY IP 250 OP 250 PS 637: Performed by: PSYCHIATRY & NEUROLOGY

## 2020-07-26 PROCEDURE — 21000000 ZZH R&B IMCU HEART CARE

## 2020-07-26 PROCEDURE — 25800030 ZZH RX IP 258 OP 636: Performed by: INTERNAL MEDICINE

## 2020-07-26 PROCEDURE — 97110 THERAPEUTIC EXERCISES: CPT | Mod: GP

## 2020-07-26 PROCEDURE — 93010 ELECTROCARDIOGRAM REPORT: CPT | Performed by: INTERNAL MEDICINE

## 2020-07-26 PROCEDURE — 99232 SBSQ HOSP IP/OBS MODERATE 35: CPT | Performed by: INTERNAL MEDICINE

## 2020-07-26 PROCEDURE — 97161 PT EVAL LOW COMPLEX 20 MIN: CPT | Mod: GP

## 2020-07-26 PROCEDURE — 00000146 ZZHCL STATISTIC GLUCOSE BY METER IP

## 2020-07-26 PROCEDURE — 99221 1ST HOSP IP/OBS SF/LOW 40: CPT | Performed by: PSYCHIATRY & NEUROLOGY

## 2020-07-26 PROCEDURE — 72131 CT LUMBAR SPINE W/O DYE: CPT

## 2020-07-26 PROCEDURE — 25000132 ZZH RX MED GY IP 250 OP 250 PS 637: Performed by: HOSPITALIST

## 2020-07-26 PROCEDURE — 97530 THERAPEUTIC ACTIVITIES: CPT | Mod: GP

## 2020-07-26 PROCEDURE — 83605 ASSAY OF LACTIC ACID: CPT | Performed by: INTERNAL MEDICINE

## 2020-07-26 PROCEDURE — 25000128 H RX IP 250 OP 636: Performed by: HOSPITALIST

## 2020-07-26 RX ORDER — LIDOCAINE 4 G/G
1 PATCH TOPICAL
Status: DISCONTINUED | OUTPATIENT
Start: 2020-07-26 | End: 2020-08-02 | Stop reason: HOSPADM

## 2020-07-26 RX ORDER — SERTRALINE HYDROCHLORIDE 25 MG/1
25 TABLET, FILM COATED ORAL DAILY
Status: DISCONTINUED | OUTPATIENT
Start: 2020-07-26 | End: 2020-08-01

## 2020-07-26 RX ORDER — LEVETIRACETAM 250 MG/1
250 TABLET ORAL 2 TIMES DAILY
Status: DISCONTINUED | OUTPATIENT
Start: 2020-07-26 | End: 2020-07-26

## 2020-07-26 RX ORDER — LEVETIRACETAM 500 MG/1
1500 TABLET ORAL 2 TIMES DAILY
Status: DISCONTINUED | OUTPATIENT
Start: 2020-07-26 | End: 2020-08-02 | Stop reason: HOSPADM

## 2020-07-26 RX ORDER — LEVETIRACETAM 500 MG/1
1000 TABLET ORAL 2 TIMES DAILY
Status: DISCONTINUED | OUTPATIENT
Start: 2020-07-26 | End: 2020-07-26

## 2020-07-26 RX ADMIN — DEXTROSE AND SODIUM CHLORIDE: 5; 900 INJECTION, SOLUTION INTRAVENOUS at 00:43

## 2020-07-26 RX ADMIN — LEVETIRACETAM 1500 MG: 500 TABLET, FILM COATED ORAL at 21:29

## 2020-07-26 RX ADMIN — MULTIPLE VITAMINS W/ MINERALS TAB 1 TABLET: TAB at 08:36

## 2020-07-26 RX ADMIN — DEXTROSE AND SODIUM CHLORIDE: 5; 900 INJECTION, SOLUTION INTRAVENOUS at 23:16

## 2020-07-26 RX ADMIN — SERTRALINE HYDROCHLORIDE 25 MG: 25 TABLET ORAL at 15:28

## 2020-07-26 RX ADMIN — OXYCODONE HYDROCHLORIDE 5 MG: 5 TABLET ORAL at 21:34

## 2020-07-26 RX ADMIN — CLONIDINE HYDROCHLORIDE 0.1 MG: 0.1 TABLET ORAL at 08:35

## 2020-07-26 RX ADMIN — CLONIDINE HYDROCHLORIDE 0.1 MG: 0.1 TABLET ORAL at 21:29

## 2020-07-26 RX ADMIN — FOLIC ACID 1 MG: 1 TABLET ORAL at 08:35

## 2020-07-26 RX ADMIN — Medication 100 MG: at 08:35

## 2020-07-26 RX ADMIN — LIDOCAINE 1 PATCH: 560 PATCH PERCUTANEOUS; TOPICAL; TRANSDERMAL at 21:43

## 2020-07-26 RX ADMIN — Medication 1 MG: at 00:41

## 2020-07-26 RX ADMIN — DEXTROSE AND SODIUM CHLORIDE: 5; 900 INJECTION, SOLUTION INTRAVENOUS at 12:00

## 2020-07-26 RX ADMIN — PANTOPRAZOLE SODIUM 40 MG: 40 INJECTION, POWDER, FOR SOLUTION INTRAVENOUS at 08:42

## 2020-07-26 RX ADMIN — OXYCODONE HYDROCHLORIDE 5 MG: 5 TABLET ORAL at 11:00

## 2020-07-26 RX ADMIN — OXYCODONE HYDROCHLORIDE 5 MG: 5 TABLET ORAL at 00:41

## 2020-07-26 RX ADMIN — LEVETIRACETAM 1500 MG: 500 TABLET, FILM COATED ORAL at 08:35

## 2020-07-26 NOTE — PLAN OF CARE
VSS. Pt complained of back pain. Given Oxycodone x1. Pt SOB w/ activity, placed on 2L per NC for comfort. HR SR/ST. CIWA score 4, essential tremor.
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
## 2020-07-26 NOTE — CONSULTS
7/24/20 CD consult acknowledged.    Per EHR, patient does not have active insurance listed so he would need Rule 25 funding from [facility] for CD services. I emailed unit  and requested she provide patient the application and let chem dep assessors know if he is interested in seeking CD services. If patient is interested in CD services the assessment will be completed by chem dep with the patient after chem dep receives completed [address] application.     Lisbeth Orozco, MARKC, LADC
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
## 2020-07-26 NOTE — PROGRESS NOTES
MD Notification    Notified Person: MD    Notified Person Name: Dr. Connelly    Notification Date/Time: 7/26/2020 6:18 AM    Notification Interaction: Paged    Purpose of Notification: Pt complaining of chronic back pain, PRN oxycodone available every 8 hours PRN, requesting to get dose sooner if possible.     Orders Received:    Comments:

## 2020-07-26 NOTE — CONSULTS
"CLINICAL NUTRITION SERVICES  -  ASSESSMENT NOTE      Recommendations Ordered by Registered Dietitian (RD):   Continue regular diet, encouraging oral intake  Boost Plus 10 am daily (or more PRN)  Continue micronutrient supplementation    Malnutrition:   % Weight Loss:  Weight loss does not meet criteria for malnutrition - see wt trending   % Intake:  <75% for > 7 days (non-severe malnutrition)  Subcutaneous Fat Loss:  Orbital region moderate depletion and Upper arm region moderate-severe depletion  Muscle Loss:  Temporal region moderate depletion and Clavicle bone region mil-moderate depletion (did not observe LEs today)  Fluid Retention:  None noted    Malnutrition Diagnosis: Non-Severe malnutrition (at risk for greater)  In Context of:  Acute illness or injury  Chronic illness or disease  Environmental or social circumstances        REASON FOR ASSESSMENT  Jorge L Townsend is a 54 year old male seen by Registered Dietitian for Provider Order - Nutrition Education - \"eval and rx\"       NUTRITION HISTORY  - Information obtained from chart review and patient at bedside  - PMH including alcohol abuse w/ withdrawal seizures and peripheral neuropathy  - No known food allergies   - Patient with flat affect during visit today, limited diet history obtained  - Reports 1 meal/day (soup, sandwiches) + snacks (hot chili fritos) --> states this is his baseline and has remained unchanged   - Per H&P, patient had been having nausea and vomiting on and off    - Resides alone and provides meals for self   - States UBW ~185#      CURRENT NUTRITION ORDERS  Diet Order:     Regular    Current Intake/Tolerance:  1 meal ordered thus far =  Dinner 7/25 included scrambled eggs, x2 sausage darlene and whole milk (</=50% consumption)  Per chart review patient reports poor appetite  Writer discussed and encouraged high protein supplements during admission, patient seems agreeable to 1 shake/day       NUTRITION FOCUSED PHYSICAL ASSESSMENT FOR " "DIAGNOSING MALNUTRITION)  Yes - partial d/t lack of patient participation          Observed:    Muscle wasting (refer to documentation in Malnutrition section) and Subcutaneous fat loss (refer to documentation in Malnutrition section)    Obtained from Chart/Interdisciplinary Team:  No edema  Alcohol withdrawal     ANTHROPOMETRICS  Height: 6' 6\"  Weight: 198 lbs 3.2 oz (89.9 kg) -- suspect some inaccuracy w/ bed scale   Body mass index is 22.9 kg/m .  Weight Status:  Normal BMI  IBW: 97.3 kg   % IBW: 92%  Weight History: difficult to determine weight trending --> patient appears to have gained weight within the past year   Wt Readings from Last 10 Encounters:   07/26/20 89.9 kg (198 lb 3.2 oz)   01/11/20 87.7 kg (193 lb 5.5 oz)   11/18/19 87.8 kg (193 lb 9.6 oz)   05/13/19 80.8 kg (178 lb 3.2 oz)   02/04/19 82.6 kg (182 lb)   11/05/18 77.6 kg (171 lb)   10/29/18 70 kg (154 lb 5.2 oz)   09/12/18 75.3 kg (166 lb)   09/05/18 77.3 kg (170 lb 6.4 oz)   08/22/18 79.4 kg (175 lb)       LABS  Labs reviewed  Recent Labs   Lab 07/26/20  1205 07/26/20  0840 07/26/20  0156 07/25/20  2107 07/25/20  1718 07/25/20  1438 07/25/20  1334  07/25/20  0546  07/24/20  1801  07/24/20  1109   GLC  --   --   --   --   --  82  --   --  77  --  86  --  263*   * 119* 103* 99 107*  --  83   < >  --    < >  --    < >  --     < > = values in this interval not displayed.       MEDICATIONS  Medications reviewed  Folic acid  Thera-vit-M  Thiamine   D5 IVF at 100 mL/hr     ASSESSED NUTRITION NEEDS PER APPROVED PRACTICE GUIDELINES:    Dosing Weight 89.9 kg   Estimated Energy Needs: 5047-6773 kcals (25-30 Kcal/Kg)  Justification: maintenance  Estimated Protein Needs: 108-135 grams protein (1.2-1.5 g pro/Kg)  Justification: preservation of lean body mass    MALNUTRITION:  % Weight Loss:  Weight loss does not meet criteria for malnutrition - see wt trending   % Intake:  <75% for > 7 days (non-severe malnutrition)  Subcutaneous Fat Loss:  Orbital " region moderate depletion and Upper arm region moderate-severe depletion  Muscle Loss:  Temporal region moderate depletion and Clavicle bone region mil-moderate depletion (did not observe LEs today)  Fluid Retention:  None noted    Malnutrition Diagnosis: Non-Severe malnutrition (at risk for greater)  In Context of:  Acute illness or injury  Chronic illness or disease  Environmental or social circumstances    NUTRITION DIAGNOSIS:  Inadequate oral intake related to decreased appetite as evidenced by minimal PO recorded thus far       NUTRITION INTERVENTIONS  Recommendations / Nutrition Prescription  Continue regular diet, encouraging oral intake  Boost Plus 10 am daily (or more PRN)  Continue micronutrient supplementation     Implementation  Nutrition education: Patient declined nutrition interventions at this time   Medical Food Supplement: as above    Nutrition Goals  Patient will consume >50% meals TID w/ at least 1 protein supplement/day      MONITORING AND EVALUATION:  Progress towards goals will be monitored and evaluated per protocol and Practice Guidelines      Marii Arredondo RD, LD  Clinical Dietitian

## 2020-07-26 NOTE — PROVIDER NOTIFICATION
Brief update:    Page regarding chronic back pain and narcotic dosing    Chronic back pain, history of alcohol abuse    Would not increase frequency of oxycodone currently as unlikely to be a discharge medication, can discuss with rounding provider if pain is more acute in nature.      Mau Connelly MD  6:52 AM

## 2020-07-27 ENCOUNTER — APPOINTMENT (OUTPATIENT)
Dept: CARDIOLOGY | Facility: CLINIC | Age: 55
DRG: 101 | End: 2020-07-27
Attending: INTERNAL MEDICINE

## 2020-07-27 ENCOUNTER — HOSPITAL ENCOUNTER (OUTPATIENT)
Dept: NEUROLOGY | Facility: CLINIC | Age: 55
DRG: 101 | End: 2020-07-27
Attending: PSYCHIATRY & NEUROLOGY

## 2020-07-27 LAB
DEPRECATED CALCIDIOL+CALCIFEROL SERPL-MC: 12 UG/L (ref 20–75)
ERYTHROCYTE [DISTWIDTH] IN BLOOD BY AUTOMATED COUNT: 13.8 % (ref 10–15)
GLUCOSE BLDC GLUCOMTR-MCNC: 102 MG/DL (ref 70–99)
GLUCOSE BLDC GLUCOMTR-MCNC: 123 MG/DL (ref 70–99)
GLUCOSE BLDC GLUCOMTR-MCNC: 127 MG/DL (ref 70–99)
GLUCOSE BLDC GLUCOMTR-MCNC: 128 MG/DL (ref 70–99)
GLUCOSE BLDC GLUCOMTR-MCNC: 133 MG/DL (ref 70–99)
HCT VFR BLD AUTO: 33.8 % (ref 40–53)
HGB BLD-MCNC: 11.2 G/DL (ref 13.3–17.7)
KETONES BLD-SCNC: 1.9 MMOL/L (ref 0–0.6)
MCH RBC QN AUTO: 35.2 PG (ref 26.5–33)
MCHC RBC AUTO-ENTMCNC: 33.1 G/DL (ref 31.5–36.5)
MCV RBC AUTO: 106 FL (ref 78–100)
PLATELET # BLD AUTO: 96 10E9/L (ref 150–450)
RBC # BLD AUTO: 3.18 10E12/L (ref 4.4–5.9)
WBC # BLD AUTO: 5.1 10E9/L (ref 4–11)

## 2020-07-27 PROCEDURE — 36415 COLL VENOUS BLD VENIPUNCTURE: CPT | Performed by: INTERNAL MEDICINE

## 2020-07-27 PROCEDURE — 00000146 ZZHCL STATISTIC GLUCOSE BY METER IP

## 2020-07-27 PROCEDURE — 25000132 ZZH RX MED GY IP 250 OP 250 PS 637: Performed by: HOSPITALIST

## 2020-07-27 PROCEDURE — 25000132 ZZH RX MED GY IP 250 OP 250 PS 637: Performed by: PSYCHIATRY & NEUROLOGY

## 2020-07-27 PROCEDURE — 25000132 ZZH RX MED GY IP 250 OP 250 PS 637: Performed by: ORTHOPAEDIC SURGERY

## 2020-07-27 PROCEDURE — 40000264 ECHOCARDIOGRAM COMPLETE

## 2020-07-27 PROCEDURE — 25500064 ZZH RX 255 OP 636: Performed by: HOSPITALIST

## 2020-07-27 PROCEDURE — 82306 VITAMIN D 25 HYDROXY: CPT | Performed by: ORTHOPAEDIC SURGERY

## 2020-07-27 PROCEDURE — 25000132 ZZH RX MED GY IP 250 OP 250 PS 637: Performed by: INTERNAL MEDICINE

## 2020-07-27 PROCEDURE — 40000061 EEG ROUTINE

## 2020-07-27 PROCEDURE — 36415 COLL VENOUS BLD VENIPUNCTURE: CPT | Performed by: ORTHOPAEDIC SURGERY

## 2020-07-27 PROCEDURE — 25000128 H RX IP 250 OP 636: Performed by: HOSPITALIST

## 2020-07-27 PROCEDURE — 85027 COMPLETE CBC AUTOMATED: CPT | Performed by: INTERNAL MEDICINE

## 2020-07-27 PROCEDURE — 40000007 ZZH STATISTIC ADULT CD FACE TO FACE-NO CHRG

## 2020-07-27 PROCEDURE — 93306 TTE W/DOPPLER COMPLETE: CPT | Mod: 26 | Performed by: INTERNAL MEDICINE

## 2020-07-27 PROCEDURE — C9113 INJ PANTOPRAZOLE SODIUM, VIA: HCPCS | Performed by: HOSPITALIST

## 2020-07-27 PROCEDURE — 25800030 ZZH RX IP 258 OP 636: Performed by: INTERNAL MEDICINE

## 2020-07-27 PROCEDURE — 99232 SBSQ HOSP IP/OBS MODERATE 35: CPT | Performed by: INTERNAL MEDICINE

## 2020-07-27 PROCEDURE — 21000000 ZZH R&B IMCU HEART CARE

## 2020-07-27 PROCEDURE — 82010 KETONE BODYS QUAN: CPT | Performed by: INTERNAL MEDICINE

## 2020-07-27 RX ADMIN — CHOLECALCIFEROL TAB 125 MCG (5000 UNIT) 125 MCG: 125 TAB at 09:18

## 2020-07-27 RX ADMIN — FOLIC ACID 1 MG: 1 TABLET ORAL at 09:18

## 2020-07-27 RX ADMIN — DEXTROSE AND SODIUM CHLORIDE: 5; 900 INJECTION, SOLUTION INTRAVENOUS at 18:32

## 2020-07-27 RX ADMIN — OXYCODONE HYDROCHLORIDE 5 MG: 5 TABLET ORAL at 09:55

## 2020-07-27 RX ADMIN — SERTRALINE HYDROCHLORIDE 25 MG: 25 TABLET ORAL at 09:20

## 2020-07-27 RX ADMIN — Medication 1 MG: at 01:45

## 2020-07-27 RX ADMIN — Medication 100 MG: at 09:17

## 2020-07-27 RX ADMIN — OXYCODONE HYDROCHLORIDE 5 MG: 5 TABLET ORAL at 17:50

## 2020-07-27 RX ADMIN — LEVETIRACETAM 1500 MG: 500 TABLET, FILM COATED ORAL at 09:15

## 2020-07-27 RX ADMIN — PANTOPRAZOLE SODIUM 40 MG: 40 INJECTION, POWDER, FOR SOLUTION INTRAVENOUS at 09:23

## 2020-07-27 RX ADMIN — Medication 600 MG: at 09:17

## 2020-07-27 RX ADMIN — DEXTROSE AND SODIUM CHLORIDE: 5; 900 INJECTION, SOLUTION INTRAVENOUS at 09:50

## 2020-07-27 RX ADMIN — Medication 600 MG: at 17:19

## 2020-07-27 RX ADMIN — MULTIPLE VITAMINS W/ MINERALS TAB 1 TABLET: TAB at 09:18

## 2020-07-27 RX ADMIN — LIDOCAINE 1 PATCH: 560 PATCH PERCUTANEOUS; TOPICAL; TRANSDERMAL at 20:13

## 2020-07-27 RX ADMIN — CLONIDINE HYDROCHLORIDE 0.1 MG: 0.1 TABLET ORAL at 09:17

## 2020-07-27 RX ADMIN — HUMAN ALBUMIN MICROSPHERES AND PERFLUTREN 9 ML: 10; .22 INJECTION, SOLUTION INTRAVENOUS at 15:00

## 2020-07-27 RX ADMIN — Medication 1 MG: at 23:28

## 2020-07-27 RX ADMIN — CLONIDINE HYDROCHLORIDE 0.1 MG: 0.1 TABLET ORAL at 20:13

## 2020-07-27 RX ADMIN — LEVETIRACETAM 1500 MG: 500 TABLET, FILM COATED ORAL at 20:13

## 2020-07-27 NOTE — PLAN OF CARE
Pt c/o pain, receiving oxycodone and repositioning. Vitals stable on RA. Up w/ A-1, unsteady on feet. Tele SR, does elevate to the 150's with any activity.  Fluids running all shift. Ketones trending down.  Neuro's no change, does have slight tremor in eyes with tracking. Planning EEG today.  Spine consulted. Not needing medication via CIWA, scoring a 4. Continue to monitor.
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
## 2020-07-27 NOTE — PROGRESS NOTES
Received critical lab value: ketones of 1.9. this number is improving. No need to update provider.

## 2020-07-27 NOTE — PROGRESS NOTES
Orthopaedic Spine Imaging Review - Full consult to follow   A CT scan of the lumbar spine was reviewed from 7/26/2020.  It shows chronic compression fractures of the superior endplatesoft L3 and L4.  There is a subtle, acute appearing, compression fracture fracture of the superior endplate of L1.  There are acute compression fractures of both the superior and inferior endplates of L2 with a cleft in the sagittal plane that extends through the posterior wall of the vertebral body.  There is no retropulsion of bone into the spinal canal.  This was read by radiologist as a burst fracture because the posterior wall of the vertebral body is violated but it is not technically a burst fracture as there is no retropulsion of bone and the posterior longitudinal ligament is intact.  Impression:  Elan Townsend is a 54-year-old man with the following diagnoses:  1.  Acute vertebral body fracture of the L2 involving the superior and inferior endplates and cleft in the sagittal plane involving the posterior wall.  2.  Subtle compression fracture of the superior endplate of L1.  3.  Chronic compression fractures of the superior endplates of L3 and L4.  4.  Osteoporosis.  5.  Probable hypovitaminosis D.    Recommendations:  The L1 and L2 fractures are stable and the patient can be mobilized as tolerated.  If it is too painful to mobilize he might benefit from bracing.  I started him on calcium supplementation and vitamin D3.  I will check his vitamin D3 levels and titrate supplementation accordingly.  Follow up with Dr. Strong in 2-3 weeks [phone].

## 2020-07-27 NOTE — PLAN OF CARE
A&O x4. VSS, on RA. Up w/ 1-2 + walker and gait belt. Tele: st.  Seizure precaution maintained. Alarms on. PRN oxy given x1. Continue to monitor.

## 2020-07-27 NOTE — PROGRESS NOTES
[facility] hospital EEG #  portable, ordered by Dr. Canas.  Pt is awake, mildly confused, slightlly tense.   Follows commands ok.
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
## 2020-07-27 NOTE — PLAN OF CARE
Neuro- A&OX4, baseline upper extremity tremor   Most Recent Vitals- Temp: 97.8  F (36.6  C) Temp src: Oral BP: (!) 161/108 Pulse: 76 Heart Rate: 84 Resp: 21 SpO2: 95 % O2 Device: None (Room air) Oxygen Delivery: 2 LPM  Tele/Cardiac- SR/ST  Resp- RA  Activity- UP with 1   Pain- low back back pain, prn oxycodone, scheduled lidocaine patch and heat pack given   Drips- D5% NS 100CC/HR.   Drains/Tubes- P-IV left hand   Skin- intact   GI/- WDL   Aggression Color- Green  Plan- Spine sugery consult for L2 fx.   Misc-     Shanae Jiménez RN

## 2020-07-27 NOTE — PLAN OF CARE
Pt c/o pain, receiving oxycodone and tylenol.  Vitals stable on RA. Up w/ Assist of 1-2 with Gb and walker, unsteady on feet, needed multiple re-balance attempts and legs were close to buckling with only steps to the bathroom. Tele SR, does elevate to the 160's with activity, MD update, pt does feel short of breath with this, MD to order echo.  Fluids running all shift. following Neuro's  Q4H -no change, does have slight tremor in eyes with tracking. Completed EEG today, results pending.  Spine consulted - continuing pain management plan.  Continue to monitor.

## 2020-07-27 NOTE — CONSULTS
"7/27/20 chem dep consult acknowledged.  Reviewed EHR prior to following up with  regarding funding needs, per patient's meeting with psychiatry yesterday \"He has absolutely no interest in considering any formal treatment\".  Will close consult and if patient changes his mind, please have him complete Rule 25 application and reorder consult.  "
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
## 2020-07-28 LAB
GLUCOSE BLDC GLUCOMTR-MCNC: 109 MG/DL (ref 70–99)
GLUCOSE BLDC GLUCOMTR-MCNC: 128 MG/DL (ref 70–99)
GLUCOSE BLDC GLUCOMTR-MCNC: 142 MG/DL (ref 70–99)
INTERPRETATION ECG - MUSE: NORMAL
MAGNESIUM SERPL-MCNC: 1.5 MG/DL (ref 1.6–2.3)
MAGNESIUM SERPL-MCNC: 2.5 MG/DL (ref 1.6–2.3)
POTASSIUM SERPL-SCNC: 3.8 MMOL/L (ref 3.4–5.3)

## 2020-07-28 PROCEDURE — 12000000 ZZH R&B MED SURG/OB

## 2020-07-28 PROCEDURE — 25000128 H RX IP 250 OP 636: Performed by: INTERNAL MEDICINE

## 2020-07-28 PROCEDURE — 25000132 ZZH RX MED GY IP 250 OP 250 PS 637: Performed by: INTERNAL MEDICINE

## 2020-07-28 PROCEDURE — 36415 COLL VENOUS BLD VENIPUNCTURE: CPT | Performed by: INTERNAL MEDICINE

## 2020-07-28 PROCEDURE — C9113 INJ PANTOPRAZOLE SODIUM, VIA: HCPCS | Performed by: HOSPITALIST

## 2020-07-28 PROCEDURE — 25000132 ZZH RX MED GY IP 250 OP 250 PS 637: Performed by: HOSPITALIST

## 2020-07-28 PROCEDURE — 25800030 ZZH RX IP 258 OP 636: Performed by: INTERNAL MEDICINE

## 2020-07-28 PROCEDURE — 83735 ASSAY OF MAGNESIUM: CPT | Performed by: INTERNAL MEDICINE

## 2020-07-28 PROCEDURE — 84132 ASSAY OF SERUM POTASSIUM: CPT | Performed by: INTERNAL MEDICINE

## 2020-07-28 PROCEDURE — 99232 SBSQ HOSP IP/OBS MODERATE 35: CPT | Performed by: INTERNAL MEDICINE

## 2020-07-28 PROCEDURE — 25000128 H RX IP 250 OP 636: Performed by: HOSPITALIST

## 2020-07-28 PROCEDURE — 25000132 ZZH RX MED GY IP 250 OP 250 PS 637: Performed by: PSYCHIATRY & NEUROLOGY

## 2020-07-28 PROCEDURE — 00000146 ZZHCL STATISTIC GLUCOSE BY METER IP

## 2020-07-28 PROCEDURE — 25000132 ZZH RX MED GY IP 250 OP 250 PS 637: Performed by: ORTHOPAEDIC SURGERY

## 2020-07-28 RX ORDER — IBUPROFEN 200 MG
500 CAPSULE ORAL 2 TIMES DAILY WITH MEALS
Status: DISCONTINUED | OUTPATIENT
Start: 2020-07-28 | End: 2020-07-28

## 2020-07-28 RX ORDER — PANTOPRAZOLE SODIUM 40 MG/1
40 TABLET, DELAYED RELEASE ORAL
Status: DISCONTINUED | OUTPATIENT
Start: 2020-07-29 | End: 2020-08-02 | Stop reason: HOSPADM

## 2020-07-28 RX ADMIN — OXYCODONE HYDROCHLORIDE 5 MG: 5 TABLET ORAL at 09:30

## 2020-07-28 RX ADMIN — OXYCODONE HYDROCHLORIDE 5 MG: 5 TABLET ORAL at 02:08

## 2020-07-28 RX ADMIN — LEVETIRACETAM 1500 MG: 500 TABLET, FILM COATED ORAL at 20:34

## 2020-07-28 RX ADMIN — CLONIDINE HYDROCHLORIDE 0.1 MG: 0.1 TABLET ORAL at 09:10

## 2020-07-28 RX ADMIN — SERTRALINE HYDROCHLORIDE 25 MG: 25 TABLET ORAL at 09:12

## 2020-07-28 RX ADMIN — MULTIPLE VITAMINS W/ MINERALS TAB 1 TABLET: TAB at 09:11

## 2020-07-28 RX ADMIN — FOLIC ACID 1 MG: 1 TABLET ORAL at 09:10

## 2020-07-28 RX ADMIN — Medication 600 MG: at 09:12

## 2020-07-28 RX ADMIN — LEVETIRACETAM 1500 MG: 500 TABLET, FILM COATED ORAL at 09:11

## 2020-07-28 RX ADMIN — DEXTROSE AND SODIUM CHLORIDE: 5; 900 INJECTION, SOLUTION INTRAVENOUS at 04:23

## 2020-07-28 RX ADMIN — MAGNESIUM SULFATE IN WATER 4 G: 40 INJECTION, SOLUTION INTRAVENOUS at 07:00

## 2020-07-28 RX ADMIN — CHOLECALCIFEROL TAB 125 MCG (5000 UNIT) 125 MCG: 125 TAB at 09:11

## 2020-07-28 RX ADMIN — LIDOCAINE 1 PATCH: 560 PATCH PERCUTANEOUS; TOPICAL; TRANSDERMAL at 20:34

## 2020-07-28 RX ADMIN — Medication 100 MG: at 09:12

## 2020-07-28 RX ADMIN — PANTOPRAZOLE SODIUM 40 MG: 40 INJECTION, POWDER, FOR SOLUTION INTRAVENOUS at 09:10

## 2020-07-28 ASSESSMENT — ACTIVITIES OF DAILY LIVING (ADL)
ADLS_ACUITY_SCORE: 13
ADLS_ACUITY_SCORE: 13
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
## 2020-07-28 NOTE — PLAN OF CARE
Patient alert, mildly forgetful otherwise A/Ox4. A1 to stand but very weak and tremulous. Uses urinal in bed. Able to move self in bed. CIWA 2 due to tremors. Neuro check wnl. Seizure precaution, pads on bed. Diastolic BP elevated high 90's to low 100. Tele SR. Oxy 5mg x1 for chronic back pain, pt states the lidocaine patch did improve back pain. D5NS at 100ml/hr. Replace Mg this am. Review plan of care with patient.

## 2020-07-28 NOTE — PROGRESS NOTES
"SW:  D:  Emailed the financial office regarding patient having no insurance.  Requested they follow up directly with patient.  Spoke with patient's MD who states that patient will be ready to discharge tomorrow to TCU.  Requested that the financial office screen patient for MA and complete the long term care application if he qualifies for MA so we can look for a TCU as \"MA pending\".  P:  Will continue to follow.      KIKI Sofia, [facility]  Lead   [phone]  [facility]  "

## 2020-07-28 NOTE — PLAN OF CARE
DATE & TIME: 07/28/20 1300- 1530  Cognitive Concerns/ Orientation : A&O x 4,   BEHAVIOR & AGGRESSION TOOL COLOR: green  CIWA SCORE: d/cd  ABNL VS/O2: vs, on RA  MOBILITY:assist of one,  GB  PAIN MANAGMENT: PRN oxy for chronic low back pain  DIET: regular  BOWEL/BLADDER:continent  ABNL LAB/BG: Mag replaced , recheck 2.5  DRAIN/DEVICES: PIV saline locked  TELEMETRY RHYTHM:SR with BBB  SKIN: scabs, Mich  TESTS/PROCEDURES: Lexiscan  stress test  tomorrow  D/C DAY/GOALS/PLACE: pending progress  OTHER IMPORTANT INFO: transferred from , chronic pain, baseline LE neuropathy,  neuros intact. CD consulted, Psych following.Seizure precautions

## 2020-07-29 ENCOUNTER — APPOINTMENT (OUTPATIENT)
Dept: NUCLEAR MEDICINE | Facility: CLINIC | Age: 55
DRG: 101 | End: 2020-07-29
Attending: INTERNAL MEDICINE

## 2020-07-29 LAB
CV STRESS MAX HR HE: 139
GLUCOSE BLDC GLUCOMTR-MCNC: 102 MG/DL (ref 70–99)
INTERPRETATION ECG - MUSE: NORMAL
MAGNESIUM SERPL-MCNC: 1.9 MG/DL (ref 1.6–2.3)
RATE PRESSURE PRODUCT: NORMAL
STRESS ECHO BASELINE DIASTOLIC HE: 105
STRESS ECHO BASELINE HR: 80
STRESS ECHO BASELINE SYSTOLIC BP: 141
STRESS ECHO CALCULATED PERCENT HR: 85 %
STRESS ECHO LAST STRESS DIASTOLIC BP: 113
STRESS ECHO LAST STRESS SYSTOLIC BP: 163
STRESS ECHO TARGET HR: 164
STRESS ST DEPRESSION: 1 MM

## 2020-07-29 PROCEDURE — 34300033 ZZH RX 343: Performed by: HOSPITALIST

## 2020-07-29 PROCEDURE — 25000132 ZZH RX MED GY IP 250 OP 250 PS 637: Performed by: INTERNAL MEDICINE

## 2020-07-29 PROCEDURE — 93017 CV STRESS TEST TRACING ONLY: CPT

## 2020-07-29 PROCEDURE — 78452 HT MUSCLE IMAGE SPECT MULT: CPT | Mod: 26 | Performed by: INTERNAL MEDICINE

## 2020-07-29 PROCEDURE — 12000000 ZZH R&B MED SURG/OB

## 2020-07-29 PROCEDURE — 93016 CV STRESS TEST SUPVJ ONLY: CPT | Performed by: INTERNAL MEDICINE

## 2020-07-29 PROCEDURE — 99232 SBSQ HOSP IP/OBS MODERATE 35: CPT | Performed by: INTERNAL MEDICINE

## 2020-07-29 PROCEDURE — 36415 COLL VENOUS BLD VENIPUNCTURE: CPT | Performed by: INTERNAL MEDICINE

## 2020-07-29 PROCEDURE — 93018 CV STRESS TEST I&R ONLY: CPT | Performed by: INTERNAL MEDICINE

## 2020-07-29 PROCEDURE — 78452 HT MUSCLE IMAGE SPECT MULT: CPT

## 2020-07-29 PROCEDURE — A9502 TC99M TETROFOSMIN: HCPCS | Performed by: HOSPITALIST

## 2020-07-29 PROCEDURE — 25000132 ZZH RX MED GY IP 250 OP 250 PS 637: Performed by: ORTHOPAEDIC SURGERY

## 2020-07-29 PROCEDURE — 00000146 ZZHCL STATISTIC GLUCOSE BY METER IP

## 2020-07-29 PROCEDURE — 40000855 ZZH STATISTIC ECHO STRESS OR NM NPI

## 2020-07-29 PROCEDURE — 83735 ASSAY OF MAGNESIUM: CPT | Performed by: INTERNAL MEDICINE

## 2020-07-29 PROCEDURE — 25000128 H RX IP 250 OP 636: Performed by: HOSPITALIST

## 2020-07-29 RX ORDER — ACYCLOVIR 200 MG/1
0-1 CAPSULE ORAL
Status: DISCONTINUED | OUTPATIENT
Start: 2020-07-29 | End: 2020-08-02 | Stop reason: HOSPADM

## 2020-07-29 RX ORDER — ALBUTEROL SULFATE 90 UG/1
2 AEROSOL, METERED RESPIRATORY (INHALATION) EVERY 5 MIN PRN
Status: DISCONTINUED | OUTPATIENT
Start: 2020-07-29 | End: 2020-07-29

## 2020-07-29 RX ORDER — REGADENOSON 0.08 MG/ML
0.4 INJECTION, SOLUTION INTRAVENOUS ONCE
Status: DISCONTINUED | OUTPATIENT
Start: 2020-07-29 | End: 2020-07-29

## 2020-07-29 RX ORDER — METOPROLOL TARTRATE 25 MG/1
25 TABLET, FILM COATED ORAL 2 TIMES DAILY
Status: DISCONTINUED | OUTPATIENT
Start: 2020-07-29 | End: 2020-08-02 | Stop reason: HOSPADM

## 2020-07-29 RX ORDER — AMINOPHYLLINE 25 MG/ML
50-100 INJECTION, SOLUTION INTRAVENOUS
Status: DISCONTINUED | OUTPATIENT
Start: 2020-07-29 | End: 2020-07-29

## 2020-07-29 RX ADMIN — FOLIC ACID 1 MG: 1 TABLET ORAL at 10:04

## 2020-07-29 RX ADMIN — REGADENOSON 0.4 MG: 0.08 INJECTION, SOLUTION INTRAVENOUS at 09:34

## 2020-07-29 RX ADMIN — Medication 600 MG: at 18:17

## 2020-07-29 RX ADMIN — MULTIPLE VITAMINS W/ MINERALS TAB 1 TABLET: TAB at 10:04

## 2020-07-29 RX ADMIN — LEVETIRACETAM 1500 MG: 500 TABLET, FILM COATED ORAL at 20:11

## 2020-07-29 RX ADMIN — CHOLECALCIFEROL TAB 125 MCG (5000 UNIT) 125 MCG: 125 TAB at 10:04

## 2020-07-29 RX ADMIN — TETROFOSMIN 31.3 MCI.: 1.38 INJECTION, POWDER, LYOPHILIZED, FOR SOLUTION INTRAVENOUS at 09:35

## 2020-07-29 RX ADMIN — METOPROLOL TARTRATE 25 MG: 25 TABLET, FILM COATED ORAL at 10:28

## 2020-07-29 RX ADMIN — TETROFOSMIN 10.4 MCI.: 1.38 INJECTION, POWDER, LYOPHILIZED, FOR SOLUTION INTRAVENOUS at 07:50

## 2020-07-29 RX ADMIN — LEVETIRACETAM 1500 MG: 500 TABLET, FILM COATED ORAL at 08:33

## 2020-07-29 RX ADMIN — METOPROLOL TARTRATE 25 MG: 25 TABLET, FILM COATED ORAL at 20:11

## 2020-07-29 RX ADMIN — Medication 600 MG: at 10:04

## 2020-07-29 RX ADMIN — SERTRALINE HYDROCHLORIDE 25 MG: 25 TABLET ORAL at 08:33

## 2020-07-29 RX ADMIN — PANTOPRAZOLE SODIUM 40 MG: 40 TABLET, DELAYED RELEASE ORAL at 10:04

## 2020-07-29 ASSESSMENT — ACTIVITIES OF DAILY LIVING (ADL)
ADLS_ACUITY_SCORE: 13
ADLS_ACUITY_SCORE: 14
ADLS_ACUITY_SCORE: 13
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
## 2020-07-29 NOTE — PROGRESS NOTES
0930 assessment for lexiscan stress test.  No caffeine today.  Lungs clear without wheezing.  IV patent.  Rates back pain at 5/10 today.  No CP or SOB pre procedure.  Pt is hypertensive pre procedure and is pt's baseline.  Procedure explained.    0927 Lexiscan stress test completed.  Tolerated well.  No ambulation on treadmill.  Only symptom reported was lightheadedness, no CP or SOB.  Pt heart rate did increase to 148 after injection- from 74 pre injection.  At time of returning back to room HR was 118 and symptom free.  OK per Dr Price to have pt return to room with HR of 118.  Report called to Mary Diaz on unit.  D.c in stable condition with transport.

## 2020-07-29 NOTE — PLAN OF CARE
DATE & TIME: 07/28/20 Night  Cognitive Concerns/ Orientation : Alert and oriented but forgetful at times   BEHAVIOR & AGGRESSION TOOL COLOR: Green  ABNL VS/O2: VSS on RA  MOBILITY: Assist of 1, walker, GB  PAIN MANAGMENT:  lidocaine patch removed by patient  DIET: Regular  BOWEL/BLADDER: Continent, using urinal   ABNL LAB/BG:   DRAIN/DEVICES: PIV saline locked  TELEMETRY RHYTHM: SR  SKIN: Scabs, Mich  TESTS/PROCEDURES: Lexiscan  stress test  today  D/C DAY/GOALS/PLACE: pending  OTHER IMPORTANT INFO: Pupils slow but other neuros intact. Seizure pads in place

## 2020-07-29 NOTE — PROGRESS NOTES
Care Coordination: Late Entry    -Email sent to Financial Counseling requesting assistance to see if pt qualifies for MA. Pt currently has no insurance.     Meera Marshall RN, BAN  Inpatient Care Coordination  [address] [place] [address]  [email or web address]  [email or web address]   Office: [phone]  Fax: [phone]  Gender pronouns: she/her/hers  Employed by Health system

## 2020-07-29 NOTE — PLAN OF CARE
Cognitive Concerns/ Orientation : A&Ox4, calls as needed, forgetful at times   BEHAVIOR & AGGRESSION TOOL COLOR: Green  CIWA SCORE: Discontinued   ABNL VS/O2: VSS, on RA  MOBILITY: 1PA, walker, GB  PAIN MANAGMENT: PRN oxy for chronic low back pain, lidocaine patch in place.   DIET: Regular, good appetite this evening  BOWEL/BLADDER: Continent, using urinal   ABNL LAB/BG: Mag replaced , recheck 2.5,  MD recommended to stop BGL checks  DRAIN/DEVICES: PIV saline locked  TELEMETRY RHYTHM: Discontinued   SKIN: Scabs, Mich  TESTS/PROCEDURES: Lexiscan  stress test  tomorrow  D/C DAY/GOALS/PLACE: 1-2 days pending progress  OTHER IMPORTANT INFO: transferred from , chronic pain, baseline LE neuropathy,  neuros intact. Seizure pads in place. Q4 Neuros intact.

## 2020-07-29 NOTE — PLAN OF CARE
PT Note 7/29/2020 4:02 PM    PT attempted at scheduled time. Pt found in bed sound asleep. Unable to stay awake.

## 2020-07-29 NOTE — PROGRESS NOTES
SPIRITUAL HEALTH SERVICES: Tele-Encounter  Patient Location: 64  Spoke with: Jorge L    Referral Source: Length of Stay    DATA:  Spoke with Jorge L briefly this morning to introduce him to  services and see how our team could be of service. Pt was not interested in a visit at this time.     PLAN:  No further need for follow-up at this time.      Jorge L Narvaezin Intern      ______________________________    Type of service:  Telephone Visit     has received verbal consent for a TelephoneVisit from the patient? Yes    Distance Provider Location: designated [place] office or home office (secure setting)    Mode of Communication: telephone (via [employer] phone or [employer] tele-call-number ([phone]))

## 2020-07-29 NOTE — PROGRESS NOTES
Care Coordination:    -Pt ready for discharge. Waiting on assistance from financial counseling for assistance with MA application. Email sent to financial counseling to expedite MA Yash.         Meera Marshall RN, BAN  Inpatient Care Coordination  [facility].  [address] [place] [address]  HAYL[place] Hendrix [address]  [email or web address]   Office: [phone]  Fax: [phone]  Gender pronouns: she/her/hers  Employed by [facility]

## 2020-07-30 ENCOUNTER — APPOINTMENT (OUTPATIENT)
Dept: PHYSICAL THERAPY | Facility: CLINIC | Age: 55
DRG: 101 | End: 2020-07-30
Attending: INTERNAL MEDICINE
Payer: OTHER GOVERNMENT

## 2020-07-30 PROCEDURE — 25000132 ZZH RX MED GY IP 250 OP 250 PS 637: Performed by: INTERNAL MEDICINE

## 2020-07-30 PROCEDURE — 97112 NEUROMUSCULAR REEDUCATION: CPT | Mod: GP

## 2020-07-30 PROCEDURE — 25000132 ZZH RX MED GY IP 250 OP 250 PS 637: Performed by: ORTHOPAEDIC SURGERY

## 2020-07-30 PROCEDURE — 99232 SBSQ HOSP IP/OBS MODERATE 35: CPT | Performed by: INTERNAL MEDICINE

## 2020-07-30 PROCEDURE — 97116 GAIT TRAINING THERAPY: CPT | Mod: GP

## 2020-07-30 PROCEDURE — 12000000 ZZH R&B MED SURG/OB

## 2020-07-30 RX ADMIN — PANTOPRAZOLE SODIUM 40 MG: 40 TABLET, DELAYED RELEASE ORAL at 06:30

## 2020-07-30 RX ADMIN — METOPROLOL TARTRATE 25 MG: 25 TABLET, FILM COATED ORAL at 09:26

## 2020-07-30 RX ADMIN — LEVETIRACETAM 1500 MG: 500 TABLET, FILM COATED ORAL at 20:30

## 2020-07-30 RX ADMIN — Medication 600 MG: at 09:26

## 2020-07-30 RX ADMIN — FOLIC ACID 1 MG: 1 TABLET ORAL at 09:26

## 2020-07-30 RX ADMIN — LEVETIRACETAM 1500 MG: 500 TABLET, FILM COATED ORAL at 09:26

## 2020-07-30 RX ADMIN — SERTRALINE HYDROCHLORIDE 25 MG: 25 TABLET ORAL at 09:26

## 2020-07-30 RX ADMIN — CHOLECALCIFEROL TAB 125 MCG (5000 UNIT) 125 MCG: 125 TAB at 09:26

## 2020-07-30 RX ADMIN — METOPROLOL TARTRATE 25 MG: 25 TABLET, FILM COATED ORAL at 20:11

## 2020-07-30 RX ADMIN — Medication 600 MG: at 17:14

## 2020-07-30 RX ADMIN — MULTIPLE VITAMINS W/ MINERALS TAB 1 TABLET: TAB at 09:26

## 2020-07-30 ASSESSMENT — ACTIVITIES OF DAILY LIVING (ADL)
ADLS_ACUITY_SCORE: 14
ADLS_ACUITY_SCORE: 13
ADLS_ACUITY_SCORE: 14
ADLS_ACUITY_SCORE: 13
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
## 2020-07-30 NOTE — PLAN OF CARE
"Discharge Planner PT   Patient plan for discharge: \"I don't know\"  Current status: Pt supine upon arrival, agreeable to PT. HR mostly 90s-109 during session, briefly 70s-80s. SBA supine>sit, sit>stand with CGA. HR to 109 with standing marching at EOB with Liss for balance. Ambulated 220' with no AD with Liss balance adjustments. Incorporated head turns & pitches for balance challenge, pt with some path deviation & missteps, with Liss balance adjustments throughout.     Engaged in dynamic balance training in room including side-steps, heel & toe walking, cross steps, modified tandem walking, mini squats with no UE support. Provided CGA-Liss for balance throughout balance training. Pt supine upon departure, all needs in reach.    Barriers to return to prior living situation: Decreased balance, decreased strength, decreased activity tolerance, increased fall risk  Recommendations for discharge: TCU  Rationale for recommendations: Pt is below baseline level of function and is at increased risk for falls. Will continue to follow during hospital stay for most appropriate discharge recommendations.       Entered by: Jillian Gomez 07/30/2020 11:40 AM       "

## 2020-07-30 NOTE — PROGRESS NOTES
"CLINICAL NUTRITION SERVICES - REASSESSMENT NOTE      RECOMMENDATIONS FOR MD/PROVIDER TO ORDER:   - Enter active diet order if pt is able to eat today (is on \"No Caffeine\" which is not recognized by meal ordering system - crosses over essentially NPO) - discussed w/ RN  - Consider scheduled bowel meds, last BM 7/25   Recommendations Ordered by Registered Dietitian (RD):   - Reg diet + Boost Plus 1x daily    Malnutrition: (7/26)  % Weight Loss:  Weight loss does not meet criteria for malnutrition - see wt trending   % Intake:  <75% for > 7 days (non-severe malnutrition)  Subcutaneous Fat Loss:  Orbital region moderate depletion and Upper arm region moderate-severe depletion  Muscle Loss:  Temporal region moderate depletion and Clavicle bone region mil-moderate depletion (did not observe LEs today)  Fluid Retention:  None noted     Malnutrition Diagnosis: Non-Severe malnutrition (at risk for greater)  In Context of:  Acute illness or injury  Chronic illness or disease  Environmental or social circumstances     EVALUATION OF PROGRESS TOWARD GOALS   Diet: \"No Caffeine Diet\" (from lexiscan yesterday - this is not an active diet order)     Intake/Tolerance:   - Patient only orders one very small meal/day. He consumes 100% of this meal. Also receives one boost daily as well.   7/29 - meatloaf, sugar cookie, 1% milk  7/28 - hash browns, milk, mac and cheese  7/27 - chicken noodle soup, chocolate shake, whole milk    ASSESSED NUTRITION NEEDS:  Dosing Weight 89.9 kg   Estimated Energy Needs: 7975-6423 kcals (25-30 Kcal/Kg)  Justification: maintenance  Estimated Protein Needs: 108-135 grams protein (1.2-1.5 g pro/Kg)  Justification: preservation of lean body mass    NEW FINDINGS:   7/29 - Lexiscan - \"without evidence of inducible ischemia\"    Stooling: last BM 7/25  Meds reviewed      Previous Goals:   Patient will consume >50% meals TID w/ at least 1 protein supplement/day  Evaluation: Not met    Previous Nutrition Diagnosis: " "  Inadequate oral intake related to decreased appetite as evidenced by minimal PO recorded thus far   Evaluation: No change - see update below     CURRENT NUTRITION DIAGNOSIS  Inadequate oral intake related to poor appetite as evidenced by 1 meal/day with 100% intake, meeting <50% est needs, e/o fat and muscle wasting.     INTERVENTIONS  Recommendations / Nutrition Prescription  Advance diet back to Regular  Boost Plus daily     Implementation  Collaboration and Referral of Nutrition care: discussed diet advancement from \"no caffeine\" to an active diet order.     Goals  Intake of 100% 1 meal/day + 1 high protein supplement.       MONITORING AND EVALUATION:  Progress towards goals will be monitored and evaluated per protocol and Practice Guidelines    Nafisa Rodriguez RD, LD  Pager: [phone]    "

## 2020-07-30 NOTE — PLAN OF CARE
DATE & TIME: 07/29/2020 4077-9732  Cognitive Concerns/ Orientation: A&O x4, forgetful, hypoactive   BEHAVIOR & AGGRESSION TOOL COLOR: Green  ABNL VS/O2: VSS, on RA. Started on scheduled metoprolol 7/29 2x daily  MOBILITY: Assist of 1, walker + GB  PAIN MANAGMENT: 4/10 back pain. Denied interventions ex rest  DIET: Regular  BOWEL/BLADDER: Continent. Urinal at bedside. Up to BR  ABNL LAB/BG: WNL. Per MD BG checks discontinued   DRAIN/DEVICES: L hand PIV SL, reinforced with tape d/t pt peeling at dressing   TELEMETRY RHYTHM: NSR  SKIN: Mich extremities, scabbed   TESTS/PROCEDURES: Lexiscan stress test 7/29  D/C DAY/GOALS/PLACE: Pending potentially TCU placement and insurance   OTHER IMPORTANT INFO: Seizure precautions, side rail pads in use. SW/PT following.

## 2020-07-30 NOTE — PLAN OF CARE
DATE & TIME: 07/29/2020 1900-2330  Cognitive Concerns/ Orientation: Alert and oriented x4, but forgetful at times   BEHAVIOR & AGGRESSION TOOL COLOR: Green, flat affect and withdrawn mood.   ABNL VS/O2: VSS, on RA, remains afebrile. BP improved after initiation of metoprolol.   MOBILITY: Assist of 1, walker + GB  PAIN MANAGMENT:  Pt refused intervention and declined scheduled lidocaine patch.   DIET: Regular, no caffeine before lexiscan stress test.   BOWEL/BLADDER: Continent, using urinal and ambulates to BR.   ABNL LAB/BG: Mg 1.9  DRAIN/DEVICES: PIV saline locked  TELEMETRY RHYTHM: NSR  SKIN: Scabs, Mich, no other concerns.   TESTS/PROCEDURES: Lexiscan stress test today; see results.   D/C DAY/GOALS/PLACE: Pending insurance and case management, recommended to TCU.   OTHER IMPORTANT INFO: Seizure pads in place, precautions maintained. Daily weight. SW/PT following. Nursing will continue to monitor.

## 2020-07-30 NOTE — PLAN OF CARE
DATE & TIME: 07/30/20 [id number]  Cognitive Concerns/ Orientation: A&Ox4, forgetful.  BEHAVIOR & AGGRESSION TOOL COLOR: Green  ABNL VS/O2: VSS on RA   MOBILITY: Up with 1 gb and walker.  PAIN MANAGMENT: Denies  DIET: Regular  BOWEL/BLADDER: Continent. Urinal at bedside  ABNL LAB/BG:  None drawn today.   DRAIN/DEVICES: PIV SL  TELEMETRY RHYTHM: NSR  SKIN: Bruised, scabs.  TESTS/PROCEDURES: Lexiscan stress test 7/29  D/C DAY/GOALS/PLACE: Pending TCU placement and insurance   OTHER IMPORTANT INFO: Seizure precautions, side rail pads in use. SW/PT following. Pt refuses Lidocaine patches. MD to discontinue BG and Neuro checks.

## 2020-07-31 ENCOUNTER — APPOINTMENT (OUTPATIENT)
Dept: PHYSICAL THERAPY | Facility: CLINIC | Age: 55
DRG: 101 | End: 2020-07-31
Attending: INTERNAL MEDICINE
Payer: OTHER GOVERNMENT

## 2020-07-31 LAB
ANION GAP SERPL CALCULATED.3IONS-SCNC: 6 MMOL/L (ref 3–14)
BUN SERPL-MCNC: 16 MG/DL (ref 7–30)
CALCIUM SERPL-MCNC: 9.3 MG/DL (ref 8.5–10.1)
CHLORIDE SERPL-SCNC: 103 MMOL/L (ref 94–109)
CO2 SERPL-SCNC: 26 MMOL/L (ref 20–32)
CREAT SERPL-MCNC: 0.72 MG/DL (ref 0.66–1.25)
GFR SERPL CREATININE-BSD FRML MDRD: >90 ML/MIN/{1.73_M2}
GLUCOSE SERPL-MCNC: 100 MG/DL (ref 70–99)
POTASSIUM SERPL-SCNC: 3.7 MMOL/L (ref 3.4–5.3)
SODIUM SERPL-SCNC: 135 MMOL/L (ref 133–144)

## 2020-07-31 PROCEDURE — 97116 GAIT TRAINING THERAPY: CPT | Mod: GP | Performed by: PHYSICAL THERAPIST

## 2020-07-31 PROCEDURE — 80177 DRUG SCRN QUAN LEVETIRACETAM: CPT | Performed by: INTERNAL MEDICINE

## 2020-07-31 PROCEDURE — 25000132 ZZH RX MED GY IP 250 OP 250 PS 637: Performed by: INTERNAL MEDICINE

## 2020-07-31 PROCEDURE — 97530 THERAPEUTIC ACTIVITIES: CPT | Mod: GP | Performed by: PHYSICAL THERAPIST

## 2020-07-31 PROCEDURE — 12000000 ZZH R&B MED SURG/OB

## 2020-07-31 PROCEDURE — 99231 SBSQ HOSP IP/OBS SF/LOW 25: CPT | Performed by: INTERNAL MEDICINE

## 2020-07-31 PROCEDURE — 80048 BASIC METABOLIC PNL TOTAL CA: CPT | Performed by: INTERNAL MEDICINE

## 2020-07-31 PROCEDURE — 36415 COLL VENOUS BLD VENIPUNCTURE: CPT | Performed by: INTERNAL MEDICINE

## 2020-07-31 PROCEDURE — 25000132 ZZH RX MED GY IP 250 OP 250 PS 637: Performed by: ORTHOPAEDIC SURGERY

## 2020-07-31 RX ADMIN — Medication 600 MG: at 08:26

## 2020-07-31 RX ADMIN — LEVETIRACETAM 1500 MG: 500 TABLET, FILM COATED ORAL at 08:26

## 2020-07-31 RX ADMIN — Medication 600 MG: at 19:34

## 2020-07-31 RX ADMIN — SERTRALINE HYDROCHLORIDE 25 MG: 25 TABLET ORAL at 08:26

## 2020-07-31 RX ADMIN — METOPROLOL TARTRATE 25 MG: 25 TABLET, FILM COATED ORAL at 08:26

## 2020-07-31 RX ADMIN — LEVETIRACETAM 1500 MG: 500 TABLET, FILM COATED ORAL at 20:16

## 2020-07-31 RX ADMIN — FOLIC ACID 1 MG: 1 TABLET ORAL at 08:26

## 2020-07-31 RX ADMIN — CHOLECALCIFEROL TAB 125 MCG (5000 UNIT) 125 MCG: 125 TAB at 08:26

## 2020-07-31 RX ADMIN — PANTOPRAZOLE SODIUM 40 MG: 40 TABLET, DELAYED RELEASE ORAL at 06:34

## 2020-07-31 RX ADMIN — MULTIPLE VITAMINS W/ MINERALS TAB 1 TABLET: TAB at 08:26

## 2020-07-31 RX ADMIN — METOPROLOL TARTRATE 25 MG: 25 TABLET, FILM COATED ORAL at 20:16

## 2020-07-31 ASSESSMENT — ACTIVITIES OF DAILY LIVING (ADL)
ADLS_ACUITY_SCORE: 14

## 2020-07-31 NOTE — PLAN OF CARE
DATE & TIME: 7/31/2020 1343-2680   Cognitive Concerns/ Orientation : A&OX4.  Forgetful   BEHAVIOR & AGGRESSION TOOL COLOR: Green  CIWA SCORE: NA  ABNL VS/O2: VSS  MOBILITY: SBA  PAIN MANAGMENT: Pain with sitting up.  Declines intervention at this time  DIET: Reg  BOWEL/BLADDER: Continent.  Urine dark sera.  Uses urinal  ABNL LAB/BG: WNL  DRAIN/DEVICES: IV saline locked  TELEMETRY RHYTHM: NA  SKIN: Bruised.  Pale.  Scabs  TESTS/PROCEDURES: NA  D/C DAY/GOALS/PLACE: TCU recommended.  Waiting for insurance  OTHER IMPORTANT INFO: Up SBA.  No alarms due to using bedside urinal.  Calls appropriately.  Tremors at baseline.  Steady with walker and gait belt  MD/RN ROUNDING SIGNED OFF D/E SHIFT: Yes  COMMIT TO SIT DONE AND SIGNED OFF Yes

## 2020-07-31 NOTE — PLAN OF CARE
DATE & TIME: 07/30/2020 0650-6375  Cognitive Concerns/ Orientation: A&O x4, forgetful, withdrawn.   BEHAVIOR & AGGRESSION TOOL COLOR: Green  ABNL VS/O2: VSS, on RA. Started on scheduled metoprolol 7/29, 2x daily, BP improved.   MOBILITY: SBA; Writer assessed pt and he was steady while ambulating in room and bathroom. Writer told pt concern of seizure precautions and pt stated he was fine with being SBA; bed alarm off so pt can use urinal at bedside. Pt verbalized understanding and has called appropriately. Walked x2 this evening in hallways.   PAIN MANAGMENT: 4/10 back pain. Denied interventions offered by writer.   DIET: Regular  BOWEL/BLADDER: Continent. Urinal at bedside. Up to BR.   ABNL LAB/BG: WNL  DRAIN/DEVICES: L hand PIV SL, reinforced with tape per pt request.   TELEMETRY RHYTHM: NSR  SKIN: Mich extremities, scabbed.   TESTS/PROCEDURES: Lexiscan stress test done 7/29.   D/C DAY/GOALS/PLACE: Pending potentially TCU placement and insurance   OTHER IMPORTANT INFO: Seizure precautions, side rail pads in use. SW/PT following. BG q4 discontinued, q4 neuro's discontinued, telemetry discontinued. Nursing will continue to monitor.

## 2020-07-31 NOTE — PLAN OF CARE
"Discharge Planner PT   Patient plan for discharge: \"Whatever works.\" Pt is aware current discharge recommendations are TCU.  Current status: Pt sleeping upon arrival of therapist, pt completed bed mobility independently. Transfers with/without FWW and SBA. Pt ambulated 200 feet with no AD, 150 feet with FWW and 150 feet with SEC and CGA. Noted lateral path deviations and occasional scissoring gait pattern. Noted improved gait speed with FWW, although pt continues to present with unsteadiness with use of FWW/SEC. Pt requested to return to supine at end of session in order to rest.   Barriers to return to prior living situation: Lives alone, Impaired balance, Fall risk  Recommendations for discharge: TCU  Rationale for recommendations: Patient will benefit from continued skilled PT intervention while IP and at TCU in order to progress dynamic balance in order to improve independence and safety with functional mobility.       Entered by: Amelia Jolley 07/31/2020 10:32 AM       "

## 2020-07-31 NOTE — PLAN OF CARE
DATE & TIME: 7/30/20 6938-8568  Cognitive Concerns/ Orientation : A&O x4, forgetful, impulsive at times   BEHAVIOR & AGGRESSION TOOL COLOR: Green  CIWA SCORE: NA  ABNL VS/O2: VSS on RA   MOBILITY: SBA d/t seizure precautions. Steady gait  PAIN MANAGMENT: Pt requested PRN oxycodone for 6/10 back pain. Offered pt PRN Tylenol and hot/cold packs. Pt refused. No medications given. Lidocaine patch refused on evenings.  DIET: Regular with boost supplements   BOWEL/BLADDER: Urinal at bedside. Continent. No BM  ABNL LAB/BG: WNL  DRAIN/DEVICES: L hand PIV SL  TELEMETRY RHYTHM: NA   SKIN: Scattered bruising and scabbing, sukhjinder  TESTS/PROCEDURES: None scheduled   D/C DAY/GOALS/PLACE: Plan is for TCU pending insurance   OTHER IMPORTANT INFO: Seizure precautions, side rails padded. Neuro following, continue Keppra 2x/day.
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
## 2020-08-01 LAB — LEVETIRACETAM SERPL-MCNC: 74 UG/ML (ref 12–46)

## 2020-08-01 PROCEDURE — 25000132 ZZH RX MED GY IP 250 OP 250 PS 637: Performed by: INTERNAL MEDICINE

## 2020-08-01 PROCEDURE — 25000132 ZZH RX MED GY IP 250 OP 250 PS 637: Performed by: ORTHOPAEDIC SURGERY

## 2020-08-01 PROCEDURE — 99232 SBSQ HOSP IP/OBS MODERATE 35: CPT | Performed by: INTERNAL MEDICINE

## 2020-08-01 PROCEDURE — 80177 DRUG SCRN QUAN LEVETIRACETAM: CPT | Performed by: HOSPITALIST

## 2020-08-01 PROCEDURE — 12000000 ZZH R&B MED SURG/OB

## 2020-08-01 PROCEDURE — 36415 COLL VENOUS BLD VENIPUNCTURE: CPT | Performed by: HOSPITALIST

## 2020-08-01 RX ADMIN — PANTOPRAZOLE SODIUM 40 MG: 40 TABLET, DELAYED RELEASE ORAL at 06:48

## 2020-08-01 RX ADMIN — LEVETIRACETAM 1500 MG: 500 TABLET, FILM COATED ORAL at 11:44

## 2020-08-01 RX ADMIN — FOLIC ACID 1 MG: 1 TABLET ORAL at 08:59

## 2020-08-01 RX ADMIN — METOPROLOL TARTRATE 25 MG: 25 TABLET, FILM COATED ORAL at 08:59

## 2020-08-01 RX ADMIN — MULTIPLE VITAMINS W/ MINERALS TAB 1 TABLET: TAB at 08:59

## 2020-08-01 RX ADMIN — LEVETIRACETAM 1500 MG: 500 TABLET, FILM COATED ORAL at 20:00

## 2020-08-01 RX ADMIN — CHOLECALCIFEROL TAB 125 MCG (5000 UNIT) 125 MCG: 125 TAB at 08:59

## 2020-08-01 RX ADMIN — METOPROLOL TARTRATE 25 MG: 25 TABLET, FILM COATED ORAL at 20:08

## 2020-08-01 RX ADMIN — Medication 600 MG: at 08:59

## 2020-08-01 RX ADMIN — SERTRALINE HYDROCHLORIDE 25 MG: 25 TABLET ORAL at 08:59

## 2020-08-01 RX ADMIN — Medication 600 MG: at 20:00

## 2020-08-01 ASSESSMENT — ACTIVITIES OF DAILY LIVING (ADL)
ADLS_ACUITY_SCORE: 11
ADLS_ACUITY_SCORE: 13
ADLS_ACUITY_SCORE: 11

## 2020-08-01 NOTE — PLAN OF CARE
DATE & TIME: 7/31/20 0930-8857  Cognitive Concerns/ Orientation : A&O x4, calm and cooperative. Calls as needed.   BEHAVIOR & AGGRESSION TOOL COLOR: Green  CIWA SCORE: NA  ABNL VS/O2: VSS on RA   MOBILITY: Now independent in room. Walking halls independently. Pt steady with minor back pains. No SOB with walks. Pt should be encouraged and knows of plan to move more daily to make sure that pt is safe for discharge back to home.   PAIN MANAGMENT: said he has back pain but refuse intervention.Lidocaine patch refused on yesterday evenings. Minor back pain with position changes and twisting.   DIET: Regular with boost supplements, good appetite and oral intake.   BOWEL/BLADDER: Urinal at bedside. Continent. Independent in room and halls  ABNL LAB/BG: WNL  DRAIN/DEVICES: L hand PIV SL  TELEMETRY RHYTHM: NA   SKIN: Scattered bruising and scabbing, sukhjinder  TESTS/PROCEDURES: None scheduled   D/C DAY/GOALS/PLACE: Plan to discharge home if deemed safe to do so. Pt encouraged to walk freq.   OTHER IMPORTANT INFO: Seizure precautions discontinued. Pt more independent and calls as needed.   COMMIT TO SIT DONE AND SIGNED OFF: yes

## 2020-08-01 NOTE — PLAN OF CARE
Cognitive Concerns/ Orientation : A&O x4, calm and cooperative. Calls as needed.   BEHAVIOR & AGGRESSION TOOL COLOR: Green  CIWA SCORE: NA  ABNL VS/O2: VSS on RA   MOBILITY: Now independent in room. Walking halls with SBA. Pt steady with minor back pains. No SOB with walks. Pt should be encouraged and knows of plan to move more daily to make sure that pt is safe for discharge back to home.   PAIN MANAGMENT: Lidocaine patch refused on evenings. Minor back pain with position changes and twisting.   DIET: Regular with boost supplements, good appetite and oral intake.   BOWEL/BLADDER: Urinal at bedside. Continent. Independent in room.    ABNL LAB/BG: WNL  DRAIN/DEVICES: L hand PIV SL  TELEMETRY RHYTHM: NA   SKIN: Scattered bruising and scabbing, sukhjinder  TESTS/PROCEDURES: None scheduled   D/C DAY/GOALS/PLACE: Plan to discharge home if deemed safe to do so. Pt encouraged to walk freq.   OTHER IMPORTANT INFO: Seizure precautions discontinued. Pt more independent and calls as needed. Walked halls x2 this evening for 15-20mins with  steadygait, talking without SOB.

## 2020-08-01 NOTE — PLAN OF CARE
PT- Attempted to see pt this PM. Pt sleeping at arrival and stated he just got back from a walk with nursing staff and declined any OOB mobility. Pt agreeable to walk with nursing again this PM.
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
## 2020-08-01 NOTE — PLAN OF CARE
Cognitive Concerns/ Orientation : A&O x4, calm and cooperative.   BEHAVIOR & AGGRESSION TOOL COLOR: Green  CIWA SCORE: n/a  ABNL VS/O2: VSS on RA   MOBILITY: Independent in room, calls appropriately. SBA in halls.   PAIN MANAGMENT: has lower back pain, worse when repositioning in bed, declined intervention. Has scheduled lidocaine patches for overnight, refused last night.   DIET: Regular with boost supplements, no appetite for breakfast or lunch, states he will eat supper.   BOWEL/BLADDER: Urinal at bedside. Continent. Independent in room and halls  ABNL LAB/BG: n/a  DRAIN/DEVICES: PIV, saline locked  TELEMETRY RHYTHM: n/a  SKIN: Scattered bruising and scabbing, sukhjinder  TESTS/PROCEDURES: n/a  D/C DAY/GOALS/PLACE: Home tomorrow, family coming at 1030.  OTHER IMPORTANT INFO: Ambulated in halls with nursing x2 this shift, no loss of balance, SOB, dizziness, etc., activity encouraged but pt declined PT.

## 2020-08-01 NOTE — PROGRESS NOTES
Spoke with patient regarding discharge plans. Noted patient is back at baseline excepting having some imbalance, Discussed with patient that writer recieved a email from Financial Counselor stating patient is not eligible for MA so will not qualify for tcu or Home care. Patient agreed that he was afraid this was going to be decision as he is still collecting unemployment. Patient states he hs a friend hat will be able to assist if needed. Patient plans on discharge tomorrow by 1030 am. Patient will notify his fiend

## 2020-08-02 VITALS
TEMPERATURE: 98.7 F | BODY MASS INDEX: 21.48 KG/M2 | WEIGHT: 185.85 LBS | OXYGEN SATURATION: 94 % | SYSTOLIC BLOOD PRESSURE: 114 MMHG | DIASTOLIC BLOOD PRESSURE: 82 MMHG | RESPIRATION RATE: 16 BRPM | HEART RATE: 78 BPM

## 2020-08-02 LAB — LEVETIRACETAM SERPL-MCNC: 34 UG/ML (ref 12–46)

## 2020-08-02 PROCEDURE — 25000132 ZZH RX MED GY IP 250 OP 250 PS 637: Performed by: INTERNAL MEDICINE

## 2020-08-02 PROCEDURE — 25000132 ZZH RX MED GY IP 250 OP 250 PS 637: Performed by: ORTHOPAEDIC SURGERY

## 2020-08-02 PROCEDURE — 99239 HOSP IP/OBS DSCHRG MGMT >30: CPT | Performed by: INTERNAL MEDICINE

## 2020-08-02 RX ORDER — METOPROLOL TARTRATE 25 MG/1
25 TABLET, FILM COATED ORAL 2 TIMES DAILY
Qty: 60 TABLET | Refills: 0 | Status: SHIPPED | OUTPATIENT
Start: 2020-08-02 | End: 2020-09-01

## 2020-08-02 RX ADMIN — FOLIC ACID 1 MG: 1 TABLET ORAL at 08:10

## 2020-08-02 RX ADMIN — Medication 600 MG: at 08:10

## 2020-08-02 RX ADMIN — MULTIPLE VITAMINS W/ MINERALS TAB 1 TABLET: TAB at 08:10

## 2020-08-02 RX ADMIN — SERTRALINE HYDROCHLORIDE 50 MG: 50 TABLET ORAL at 08:10

## 2020-08-02 RX ADMIN — METOPROLOL TARTRATE 25 MG: 25 TABLET, FILM COATED ORAL at 08:10

## 2020-08-02 RX ADMIN — CHOLECALCIFEROL TAB 125 MCG (5000 UNIT) 125 MCG: 125 TAB at 08:10

## 2020-08-02 RX ADMIN — PANTOPRAZOLE SODIUM 40 MG: 40 TABLET, DELAYED RELEASE ORAL at 06:46

## 2020-08-02 RX ADMIN — LEVETIRACETAM 1500 MG: 500 TABLET, FILM COATED ORAL at 08:10

## 2020-08-02 ASSESSMENT — ACTIVITIES OF DAILY LIVING (ADL)
ADLS_ACUITY_SCORE: 11
ADLS_ACUITY_SCORE: 13
ADLS_ACUITY_SCORE: 11

## 2020-08-02 NOTE — PROGRESS NOTES
Cognitive Concerns/ Orientation : A&Ox4, calm and cooperative. Calls as needed.   BEHAVIOR & AGGRESSION TOOL COLOR: Green  CIWA SCORE: NA   ABNL VS/O2: VSS on RA   MOBILITY: Independent in room, calls appropriately. Independent in halls, walks frequently/   PAIN MANAGMENT: Lower back pain with twisting and sitting up. Refused scheduled Lidocaine patch. Tolerable when up and moving. Declined intervention.   DIET: Regular, good appetite and oral intake  BOWEL/BLADDER: Using bathroom and urinal at bedside at times.   ABNL LAB/BG: Kera level 74.   DRAIN/DEVICES: PIV, saline locked LUE   TELEMETRY RHYTHM: NA   SKIN: Scattered bruising and scabbing, sukhjinder  TESTS/PROCEDURES: NA   D/C DAY/GOALS/PLACE: Home today, family coming at 1030. MD will round on pt early in the morning.   OTHER IMPORTANT INFO: Ambulated in halls freq independently without any difficulty, SOB, or weakness. Calls as needed.

## 2020-08-02 NOTE — PLAN OF CARE
Physical Therapy Discharge Summary    Reason for therapy discharge:    Discharged to home.    Progress towards therapy goal(s). See goals on Care Plan in [facility] electronic health record for goal details.  Goals not met.  Barriers to achieving goals:   discharge from facility.    Therapy recommendation(s):    Continued therapy is recommended.  Rationale/Recommendations:  Recommended TCU, due to lack of insurance coverage. Pt decided to discharge home. Pt is not eligible for Home PT. Recommend patient has SBA for mobility at home with use of FWW.

## 2020-08-02 NOTE — PLAN OF CARE
Cognitive Concerns/ Orientation : A&Ox4, calm and cooperative. Calls as needed.   BEHAVIOR & AGGRESSION TOOL COLOR: Green  CIWA SCORE: NA   ABNL VS/O2: VSS on RA   MOBILITY: Independent in room, calls appropriately. Independent in halls, walks frequently/   PAIN MANAGMENT: Lower back pain with twisting and sitting up. Refused scheduled Lidocaine patch. Tolerable when up and moving.    DIET: Regular, good appetite and oral intake this evening.   BOWEL/BLADDER: Using bathroom and urinal at bedside at times.   ABNL LAB/BG: Kera level 74.   DRAIN/DEVICES: PIV, saline locked LUE   TELEMETRY RHYTHM: NA   SKIN: Scattered bruising and scabbing, sukhjinder  TESTS/PROCEDURES: NA   D/C DAY/GOALS/PLACE: Home tomorrow, family coming at 1030. MD will round on pt early in the morning.   OTHER IMPORTANT INFO: Ambulated in halls freq independently without any difficulty, SOB, or weakness. Calls as needed.

## 2020-08-02 NOTE — CONSULTS
Care Transition Initial Assessment -      Met with: patient  Principal Problem:    Seizure (H)  Active Problems:    Alcohol withdrawal (H)       DATA  Lives With: alone   Living Arrangements: apartment    Identified issues/concerns regarding health management: Patient admitted with acute encephalopathy suspected secondary to breakthrough seizure. Seizure related to head trauma in 2015.  Dr Larson, psychiatrist, consulted on 7/26 for alcohol withdrawal. Dr Larson's diagnosis is   1.  Alcohol use disorder, severe.   2.  Alcohol withdrawal with seizure.   3.  Seizure disorder by history, on Keppra.   4.  Peripheral neuropathy by history  Trial of medication to manage his anxiety and depression.  Patient requested Dr Larson's card for outpatient psychiatry.  Patient declined consideration of in patient or out patient CD treatment.  Dr Larson requested patient be given information about the SMART Recovery peer support groups.  Insurance:  Patient does not carry insurance.   Our financial counselor met with patient and stated patient will not qualify for Medicaid, however in speaking with patient's friend Garima, patient did apply for [place] [facility] 2 months ago.   TCU was originally recommended however does not have insurance. Per RN notes, patient is now up walking with SBA in the halls.         Transportation Anticipated: family or friend will provide    ASSESSMENT  Cognitive Status:  Per RN notes, patient a/oX4.   During writer's visit, patient did not appear to remember discussion with Dr Larson in regards to patient requesting Dr Larson's card or discussion of SMRT program.  Concerns to be addressed: Met with patient to give him written information about the SMART program (Self Management and Recovery Program).  Patient reports his friend has resources for him and he said he will review this program with his friend.  Patient reports he doesn't believe in the 12 step program. Writer encouraged him to read the  information as the SMART program does not appear dependent upon the 12 step program.     PLAN Patient has the SMART information and can access in person support groups and on line groups if he chooses. No further SW involvement planned.  Financial costs for the patient includes none related to the SMART resource  Patient given options and choices for discharge yes  Patient/family is agreeable to the plan? Patient willing to review information about SMART  Transportation friend will transport  Patient Goals and Preferences: returning home today  Patient anticipates discharging to:  discharge today
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
## 2020-08-03 ENCOUNTER — TELEPHONE (OUTPATIENT)
Dept: FAMILY MEDICINE | Facility: CLINIC | Age: 55
End: 2020-08-03

## 2020-08-03 NOTE — TELEPHONE ENCOUNTER
Patient was discharged from [facility] on 08/02/20 after being treated for Alcohol Withdrawal Seizure With Complication (H), Essential Hypertension. Triage please follow up with patient.      .Olivia SO    [facility] [place]

## 2020-08-03 NOTE — TELEPHONE ENCOUNTER
Routing to care coordination for reason for hospital visit.     Marlene Kerns RN   [facility] - Triage

## 2021-01-15 ENCOUNTER — HEALTH MAINTENANCE LETTER (OUTPATIENT)
Age: 56
End: 2021-01-15

## 2021-02-25 NOTE — PROGRESS NOTES
Lethargic. Seizure precautions. Pain in back, declines interventions. Stress test done today. Up with Ax1. Voiding. D.c pending placement   [Time Spent: ___ minutes] : I have spent [unfilled] minutes of time on the encounter.

## 2021-04-20 ENCOUNTER — IMMUNIZATION (OUTPATIENT)
Dept: NURSING | Facility: CLINIC | Age: 56
End: 2021-04-20
Payer: COMMERCIAL

## 2021-04-20 PROCEDURE — 0001A PR COVID VAC PFIZER DIL RECON 30 MCG/0.3 ML IM: CPT

## 2021-04-20 PROCEDURE — 91300 PR COVID VAC PFIZER DIL RECON 30 MCG/0.3 ML IM: CPT

## 2021-05-11 ENCOUNTER — IMMUNIZATION (OUTPATIENT)
Dept: NURSING | Facility: CLINIC | Age: 56
End: 2021-05-11
Attending: FAMILY MEDICINE
Payer: COMMERCIAL

## 2021-05-11 PROCEDURE — 91300 PR COVID VAC PFIZER DIL RECON 30 MCG/0.3 ML IM: CPT

## 2021-05-11 PROCEDURE — 0002A PR COVID VAC PFIZER DIL RECON 30 MCG/0.3 ML IM: CPT

## 2021-09-04 ENCOUNTER — HEALTH MAINTENANCE LETTER (OUTPATIENT)
Age: 56
End: 2021-09-04

## 2022-02-19 ENCOUNTER — HEALTH MAINTENANCE LETTER (OUTPATIENT)
Age: 57
End: 2022-02-19

## 2022-10-22 ENCOUNTER — HEALTH MAINTENANCE LETTER (OUTPATIENT)
Age: 57
End: 2022-10-22

## 2023-11-09 NOTE — PHARMACY-ADMISSION MEDICATION HISTORY
Admission medication history interview status for the 8/5/2018  admission is complete. See EPIC admission navigator for prior to admission medications     Medication history source reliability:Good    Actions taken by pharmacist (provider contacted, etc):None     Additional medication history information not noted on PTA med list :None    Medication reconciliation/reorder completed by provider prior to medication history? No    Time spent in this activity: 15 minutes    Spoke with patient, called the pharmacy to verify the strength.   Levetiracetam 1000 mg is supposed to be taken BID, however patient only takes once daily.    Prior to Admission medications    Medication Sig Last Dose Taking? Auth Provider   levETIRAcetam 1000 MG TABS Take 1 tablet by mouth daily 8/5/2018 at am Yes Unknown, Entered By History   metroNIDAZOLE (METROGEL) 0.75 % topical gel Apply topically 2 times daily Apply to nose 8/5/2018 at am Yes Unknown, Entered By History       
2
